# Patient Record
Sex: FEMALE | Race: ASIAN | Employment: OTHER | URBAN - METROPOLITAN AREA
[De-identification: names, ages, dates, MRNs, and addresses within clinical notes are randomized per-mention and may not be internally consistent; named-entity substitution may affect disease eponyms.]

---

## 2017-01-20 ENCOUNTER — TELEPHONE (OUTPATIENT)
Dept: HEMATOLOGY/ONCOLOGY | Facility: HOSPITAL | Age: 62
End: 2017-01-20

## 2017-01-24 ENCOUNTER — TELEPHONE (OUTPATIENT)
Dept: HEMATOLOGY/ONCOLOGY | Facility: HOSPITAL | Age: 62
End: 2017-01-24

## 2017-01-24 NOTE — TELEPHONE ENCOUNTER
Returned patient's call.   LMOMTCB re: high-risk consultation (Referrals in Epic to complete consult)

## 2017-03-01 ENCOUNTER — OFFICE VISIT (OUTPATIENT)
Dept: GENETICS | Facility: HOSPITAL | Age: 62
End: 2017-03-01
Attending: GENETIC COUNSELOR, MS
Payer: COMMERCIAL

## 2017-03-01 ENCOUNTER — OFFICE VISIT (OUTPATIENT)
Dept: SURGERY | Facility: CLINIC | Age: 62
End: 2017-03-01

## 2017-03-01 VITALS
HEART RATE: 72 BPM | BODY MASS INDEX: 25.47 KG/M2 | WEIGHT: 138.38 LBS | DIASTOLIC BLOOD PRESSURE: 71 MMHG | SYSTOLIC BLOOD PRESSURE: 148 MMHG | HEIGHT: 62 IN

## 2017-03-01 DIAGNOSIS — Z80.3 FAMILY HISTORY OF MALIGNANT NEOPLASM OF BREAST: Primary | ICD-10-CM

## 2017-03-01 PROCEDURE — 96040 MEDICAL GENETICS COUNSELING EA 30 MIN: CPT | Performed by: GENETIC COUNSELOR, MS

## 2017-03-01 PROCEDURE — 99244 OFF/OP CNSLTJ NEW/EST MOD 40: CPT | Performed by: SURGERY

## 2017-03-01 NOTE — PROGRESS NOTES
Reason for visit: Mrs. Liset Adams is a 19-year-old woman referred to the high risk breast cancer and prevention clinic for a family history of early-onset breast cancer as well as other cancers.   She was seen today for genetic counseling and to discuss the nulliparous. She is currently post-menopausal and denies any use of hormone replacement therapy or oral contraceptives. Mrs. Amirah Terry denies any alcohol consumption or tobacco usage.   She has had a colonoscopy with normal results and is aware that she BRCA2 account for most (but not all) cases of hereditary breast cancer.   Mutations in other genes have also been associated with an increased risk for breast cancer - but mutations in these other genes are statistically less often seen in hereditary breast insurers, long term healthcare or disability, which are not covered by statutes yet. She plans to discuss the option of testing with her sister and determine if she would be willing to pursue this.  We did review that her sister meets Medicare criteria for

## 2017-03-02 NOTE — PROGRESS NOTES
High Risk Breast Cancer Screening and Prevention Clinic    History of Present Illness:   This is the first visit for this 64year old woman, referred by Dr. Gabriele Langley, who presents for breast cancer risk evaluation related to her family history, which is deta Oral Tab Take  by mouth. Disp:  Rfl:    Garlic 5970 MG Oral Cap Take  by mouth. Disp:  Rfl:    cetirizine (ZYRTEC) 10 MG Oral Tab Take 10 mg by mouth daily.  Disp:  Rfl:      Allergies:      Latex                       Comment:Other reaction(s): LATEX    Fa There is no history of difficulty or pain with swallowing, +reflux symptoms, vomiting, dark or bloody stools, constipation, yellowing of the skin, indigestion, nausea, change in bowel habits, diarrhea, abdominal pain or vomiting blood.      Genitourinary: masses/nodules. There are no palpable masses. The trachea is in the midline. Conjunctiva are clear, non-icteric. Chest: The chest expands symmetrically. The lungs are clear to auscultation. Heart: The rhythm is regular.   There are no murmurs, rubs, g to include age, future breast biopsy, as well as additional family members that may be diagnosed with breast cancer. We then turned our discussion towards genetic counseling and testing.  Based on the findings, she met with our genetic counselor today t

## 2017-05-17 ENCOUNTER — TELEPHONE (OUTPATIENT)
Dept: INTERNAL MEDICINE CLINIC | Facility: CLINIC | Age: 62
End: 2017-05-17

## 2017-06-30 ENCOUNTER — APPOINTMENT (OUTPATIENT)
Dept: LAB | Age: 62
End: 2017-06-30
Attending: INTERNAL MEDICINE
Payer: COMMERCIAL

## 2017-06-30 ENCOUNTER — OFFICE VISIT (OUTPATIENT)
Dept: INTERNAL MEDICINE CLINIC | Facility: CLINIC | Age: 62
End: 2017-06-30

## 2017-06-30 VITALS
HEART RATE: 72 BPM | DIASTOLIC BLOOD PRESSURE: 88 MMHG | TEMPERATURE: 98 F | HEIGHT: 62 IN | BODY MASS INDEX: 25.06 KG/M2 | WEIGHT: 136.19 LBS | SYSTOLIC BLOOD PRESSURE: 142 MMHG | OXYGEN SATURATION: 97 %

## 2017-06-30 DIAGNOSIS — Z78.0 POSTMENOPAUSAL: ICD-10-CM

## 2017-06-30 DIAGNOSIS — Z00.00 ROUTINE GENERAL MEDICAL EXAMINATION AT A HEALTH CARE FACILITY: ICD-10-CM

## 2017-06-30 DIAGNOSIS — R92.2 DENSE BREASTS: ICD-10-CM

## 2017-06-30 DIAGNOSIS — Z12.11 COLON CANCER SCREENING: ICD-10-CM

## 2017-06-30 DIAGNOSIS — R76.11 POSITIVE PPD: ICD-10-CM

## 2017-06-30 DIAGNOSIS — R73.9 HYPERGLYCEMIA: ICD-10-CM

## 2017-06-30 DIAGNOSIS — Z12.31 VISIT FOR SCREENING MAMMOGRAM: ICD-10-CM

## 2017-06-30 DIAGNOSIS — Z86.69 HISTORY OF MENIERE'S DISEASE: ICD-10-CM

## 2017-06-30 DIAGNOSIS — K21.9 GASTROESOPHAGEAL REFLUX DISEASE WITHOUT ESOPHAGITIS: ICD-10-CM

## 2017-06-30 DIAGNOSIS — Z91.89 AT HIGH RISK FOR BREAST CANCER: ICD-10-CM

## 2017-06-30 DIAGNOSIS — Z00.00 ROUTINE GENERAL MEDICAL EXAMINATION AT A HEALTH CARE FACILITY: Primary | ICD-10-CM

## 2017-06-30 DIAGNOSIS — Z12.11 SCREENING FOR COLON CANCER: ICD-10-CM

## 2017-06-30 LAB
BILIRUB UR QL: NEGATIVE
CHOLEST SERPL-MCNC: 143 MG/DL (ref 110–200)
COLOR UR: YELLOW
ERYTHROCYTE [DISTWIDTH] IN BLOOD BY AUTOMATED COUNT: 13.5 % (ref 11–15)
FOLATE SERPL-MCNC: >23.3 NG/ML
GLUCOSE UR-MCNC: NEGATIVE MG/DL
HBA1C MFR BLD: 5.7 % (ref 4–6)
HCT VFR BLD AUTO: 39.7 % (ref 35–48)
HDLC SERPL-MCNC: 56 MG/DL
HGB BLD-MCNC: 13.3 G/DL (ref 12–16)
KETONES UR-MCNC: NEGATIVE MG/DL
LDLC SERPL CALC-MCNC: 70 MG/DL (ref 0–99)
LEUKOCYTE ESTERASE UR QL STRIP.AUTO: NEGATIVE
MCH RBC QN AUTO: 30 PG (ref 27–32)
MCHC RBC AUTO-ENTMCNC: 33.5 G/DL (ref 32–37)
MCV RBC AUTO: 89.5 FL (ref 80–100)
NITRITE UR QL STRIP.AUTO: NEGATIVE
NONHDLC SERPL-MCNC: 87 MG/DL
PH UR: 6 [PH] (ref 5–8)
PLATELET # BLD AUTO: 204 K/UL (ref 140–400)
PMV BLD AUTO: 7.5 FL (ref 7.4–10.3)
PROT UR-MCNC: NEGATIVE MG/DL
RBC # BLD AUTO: 4.44 M/UL (ref 3.7–5.4)
RBC #/AREA URNS AUTO: 3 /HPF
SP GR UR STRIP: 1.01 (ref 1–1.03)
T4 FREE SERPL-MCNC: 1.04 NG/DL (ref 0.58–1.64)
TRIGL SERPL-MCNC: 85 MG/DL (ref 1–149)
TSH SERPL-ACNC: 0.87 UIU/ML (ref 0.45–5.33)
UROBILINOGEN UR STRIP-ACNC: <2
VIT B12 SERPL-MCNC: >1500 PG/ML (ref 181–914)
VIT C UR-MCNC: NEGATIVE MG/DL
WBC # BLD AUTO: 6 K/UL (ref 4–11)
WBC #/AREA URNS AUTO: 1 /HPF

## 2017-06-30 PROCEDURE — 84439 ASSAY OF FREE THYROXINE: CPT

## 2017-06-30 PROCEDURE — 85027 COMPLETE CBC AUTOMATED: CPT

## 2017-06-30 PROCEDURE — 83036 HEMOGLOBIN GLYCOSYLATED A1C: CPT

## 2017-06-30 PROCEDURE — 99396 PREV VISIT EST AGE 40-64: CPT | Performed by: INTERNAL MEDICINE

## 2017-06-30 PROCEDURE — 93005 ELECTROCARDIOGRAM TRACING: CPT

## 2017-06-30 PROCEDURE — 80061 LIPID PANEL: CPT

## 2017-06-30 PROCEDURE — 86706 HEP B SURFACE ANTIBODY: CPT

## 2017-06-30 PROCEDURE — 93010 ELECTROCARDIOGRAM REPORT: CPT | Performed by: INTERNAL MEDICINE

## 2017-06-30 PROCEDURE — 36415 COLL VENOUS BLD VENIPUNCTURE: CPT

## 2017-06-30 PROCEDURE — 84443 ASSAY THYROID STIM HORMONE: CPT

## 2017-06-30 PROCEDURE — 82607 VITAMIN B-12: CPT

## 2017-06-30 PROCEDURE — 82306 VITAMIN D 25 HYDROXY: CPT

## 2017-06-30 PROCEDURE — 81001 URINALYSIS AUTO W/SCOPE: CPT

## 2017-06-30 PROCEDURE — 82746 ASSAY OF FOLIC ACID SERUM: CPT

## 2017-06-30 RX ORDER — ASCORBIC ACID 500 MG
500 TABLET ORAL DAILY
COMMUNITY

## 2017-06-30 RX ORDER — UBIDECARENONE 100 MG
100 CAPSULE ORAL DAILY
COMMUNITY

## 2017-06-30 NOTE — PROGRESS NOTES
HPI:    Patient ID: Lulú Morales is a 64year old female.     HPI    Physical exam    Hx of postive PPD  Request  CXR  Renal dialysis RN would like  HepB antibody  Level  Generally healthy  Took  Med for osteopenis in the past   Dense breast  Due TABLET BY MOUTH ON THE SAME DATE WITH FULL GLASS OF WATER AT LEAST 30 MINUTES BEFORE FIRST FOOD/ DRINK Disp: 3 tablet Rfl: 3   hydrochlorothiazide 25 MG Oral Tab TAKE 1 TABLET BY MOUTH EVERY DAY AS NEEDED Disp: 90 tablet Rfl: 3   Atorvastatin Calcium 20 MG info      Social History: Smoking status: Never Smoker                                                              Alcohol use: No                 PHYSICAL EXAM:    Physical Exam   Constitutional: She appears well-nourished. No distress.    HENT:   Head: N ANTIBODY    Generally healthy      (R73.9) Hyperglycemia  Plan: labs ordered    (Z86.69) History of Meniere's disease  Plan: on hydrocholorthiazide  Denies hx of HTN    (Z91.89) At high risk for breast cancer  Plan: mammo ordered    (R92.2) Dense breasts

## 2017-07-03 LAB
25(OH)D3 SERPL-MCNC: 39.5 NG/ML
HBV SURFACE AB SER-ACNC: 279.69 MIU/ML (ref ?–10)
HBV SURFACE AG SERPL QL IA: REACTIVE

## 2017-07-10 ENCOUNTER — HOSPITAL ENCOUNTER (OUTPATIENT)
Dept: GENERAL RADIOLOGY | Facility: HOSPITAL | Age: 62
Discharge: HOME | End: 2017-07-10
Attending: INTERNAL MEDICINE
Payer: COMMERCIAL

## 2017-07-10 DIAGNOSIS — R76.11 POSITIVE PPD: ICD-10-CM

## 2017-07-10 PROCEDURE — 71020 XR CHEST PA + LAT CHEST (CPT=71020): CPT | Performed by: INTERNAL MEDICINE

## 2017-07-27 ENCOUNTER — TELEPHONE (OUTPATIENT)
Dept: INTERNAL MEDICINE CLINIC | Facility: CLINIC | Age: 62
End: 2017-07-27

## 2017-08-26 DIAGNOSIS — E78.5 HYPERLIPIDEMIA, UNSPECIFIED HYPERLIPIDEMIA TYPE: ICD-10-CM

## 2017-08-28 RX ORDER — ATORVASTATIN CALCIUM 20 MG/1
TABLET, FILM COATED ORAL
Qty: 90 TABLET | Refills: 0 | Status: SHIPPED | OUTPATIENT
Start: 2017-08-28 | End: 2017-12-07

## 2017-08-29 ENCOUNTER — TELEPHONE (OUTPATIENT)
Dept: GASTROENTEROLOGY | Facility: CLINIC | Age: 62
End: 2017-08-29

## 2017-08-29 DIAGNOSIS — K21.9 GASTROESOPHAGEAL REFLUX DISEASE, ESOPHAGITIS PRESENCE NOT SPECIFIED: ICD-10-CM

## 2017-08-29 DIAGNOSIS — Z80.0 FAMILY HISTORY OF COLON CANCER: Primary | ICD-10-CM

## 2017-08-30 NOTE — TELEPHONE ENCOUNTER
See 6/29/17 tele enc. Pt previously cancelled and now calling back to reschedule. Routed to surgery schedulers.  Can take up to 72 hours for call back    Colonoscopy & EGD    Dx: family history of colon cancer and GERD    Miralax prep    IV sedation

## 2017-08-30 NOTE — TELEPHONE ENCOUNTER
Pt is aware of at least 72 hr call back time. Pt was upset she was not informed of this before and was expecting a call yesterday.  Please Call Thank You

## 2017-09-11 NOTE — TELEPHONE ENCOUNTER
CBLM to schedule procedure.  Please transfer to [facility] in GI

## 2017-09-13 NOTE — TELEPHONE ENCOUNTER
Scheduled for:  Colonoscopy [address] and EGD [address]  Provider Name: Dr. Paras Richardson  Date:  12/4/17  Location:  [facility]  Sedation:  IV  Time:  0900 (pt is aware to arrive at 0800)   Prep:  2 day Miralax/Gatorade, mailed 9/13/17  Meds/Allergies Reconciled?:  Physician Nela Randolph

## 2017-10-11 ENCOUNTER — PATIENT MESSAGE (OUTPATIENT)
Dept: INTERNAL MEDICINE CLINIC | Facility: CLINIC | Age: 62
End: 2017-10-11

## 2017-10-12 NOTE — TELEPHONE ENCOUNTER
From: Frieda Puentes  To: Renea Shelton MD  Sent: 10/11/2017 5:02 PM CDT  Subject: Referral Lindo Ray Dr. Rommel Hernandez,   Could you please send me a referral for a mammogram?, I miss placed the one I had. Could you also put it in MyChart?

## 2017-10-31 ENCOUNTER — HOSPITAL ENCOUNTER (OUTPATIENT)
Dept: MAMMOGRAPHY | Age: 62
Discharge: HOME | End: 2017-10-31
Attending: INTERNAL MEDICINE
Payer: COMMERCIAL

## 2017-10-31 DIAGNOSIS — Z12.31 VISIT FOR SCREENING MAMMOGRAM: ICD-10-CM

## 2017-10-31 DIAGNOSIS — R92.2 DENSE BREASTS: ICD-10-CM

## 2017-10-31 PROCEDURE — 77067 SCR MAMMO BI INCL CAD: CPT | Performed by: INTERNAL MEDICINE

## 2017-10-31 PROCEDURE — 77063 BREAST TOMOSYNTHESIS BI: CPT | Performed by: INTERNAL MEDICINE

## 2017-12-04 ENCOUNTER — SURGERY (OUTPATIENT)
Age: 62
End: 2017-12-04

## 2017-12-04 ENCOUNTER — HOSPITAL ENCOUNTER (OUTPATIENT)
Facility: HOSPITAL | Age: 62
Setting detail: HOSPITAL OUTPATIENT SURGERY
Discharge: HOME | End: 2017-12-04
Attending: INTERNAL MEDICINE | Admitting: INTERNAL MEDICINE
Payer: COMMERCIAL

## 2017-12-04 DIAGNOSIS — Z80.0 FAMILY HISTORY OF COLON CANCER: ICD-10-CM

## 2017-12-04 DIAGNOSIS — K21.9 GASTROESOPHAGEAL REFLUX DISEASE, ESOPHAGITIS PRESENCE NOT SPECIFIED: ICD-10-CM

## 2017-12-04 PROCEDURE — 0DBL8ZX EXCISION OF TRANSVERSE COLON, VIA NATURAL OR ARTIFICIAL OPENING ENDOSCOPIC, DIAGNOSTIC: ICD-10-PCS | Performed by: INTERNAL MEDICINE

## 2017-12-04 PROCEDURE — 99152 MOD SED SAME PHYS/QHP 5/>YRS: CPT | Performed by: INTERNAL MEDICINE

## 2017-12-04 PROCEDURE — 0DB68ZX EXCISION OF STOMACH, VIA NATURAL OR ARTIFICIAL OPENING ENDOSCOPIC, DIAGNOSTIC: ICD-10-PCS | Performed by: INTERNAL MEDICINE

## 2017-12-04 PROCEDURE — 0DB98ZX EXCISION OF DUODENUM, VIA NATURAL OR ARTIFICIAL OPENING ENDOSCOPIC, DIAGNOSTIC: ICD-10-PCS | Performed by: INTERNAL MEDICINE

## 2017-12-04 PROCEDURE — 45385 COLONOSCOPY W/LESION REMOVAL: CPT | Performed by: INTERNAL MEDICINE

## 2017-12-04 PROCEDURE — 43239 EGD BIOPSY SINGLE/MULTIPLE: CPT | Performed by: INTERNAL MEDICINE

## 2017-12-04 PROCEDURE — 99153 MOD SED SAME PHYS/QHP EA: CPT | Performed by: INTERNAL MEDICINE

## 2017-12-04 RX ORDER — MIDAZOLAM HYDROCHLORIDE 1 MG/ML
INJECTION INTRAMUSCULAR; INTRAVENOUS
Status: DISCONTINUED | OUTPATIENT
Start: 2017-12-04 | End: 2017-12-04

## 2017-12-04 RX ORDER — SODIUM CHLORIDE, SODIUM LACTATE, POTASSIUM CHLORIDE, CALCIUM CHLORIDE 600; 310; 30; 20 MG/100ML; MG/100ML; MG/100ML; MG/100ML
INJECTION, SOLUTION INTRAVENOUS CONTINUOUS
Status: DISCONTINUED | OUTPATIENT
Start: 2017-12-04 | End: 2017-12-04

## 2017-12-04 RX ORDER — SODIUM CHLORIDE 0.9 % (FLUSH) 0.9 %
10 SYRINGE (ML) INJECTION AS NEEDED
Status: DISCONTINUED | OUTPATIENT
Start: 2017-12-04 | End: 2017-12-04

## 2017-12-04 RX ORDER — MIDAZOLAM HYDROCHLORIDE 1 MG/ML
1 INJECTION INTRAMUSCULAR; INTRAVENOUS EVERY 5 MIN PRN
Status: DISCONTINUED | OUTPATIENT
Start: 2017-12-04 | End: 2017-12-04

## 2017-12-04 NOTE — OPERATIVE REPORT
[facility] Endoscopy Report      Date of Procedure:  12/04/17      Preoperative Diagnosis:  1. Colorectal cancer screening  2. Chronic gastroesophageal reflux disease      Postoperative Diagnosis:  1.   Diminutive transverse colon polyp  2 fundus was performed. The instrument was straightened, insufflated air and fluid were suctioned and the endoscope was withdrawn. The procedures were well tolerated without immediate complication.       Findings:  The preparation of the colon was very good

## 2017-12-04 NOTE — H&P
History & Physical Examination    Patient Name: Pinky Markham  MRN: [id number]  [facility]: [id number]  YOB: 1955    Diagnosis: Colorectal cancer screening, GERD        Prescriptions Prior to Admission:  ATORVASTATIN 20 MG Oral Tab TAKE 1 T Problem Relation Age of Onset   • Breast Cancer Sister 28 2nd dx @ 76, contralateral   • Cancer Father 80     d.80; non-smoker   • Cancer Brother 61     fmr smoker; stomach ca   • Cancer Paternal Aunt      cervical ca   • Stroke Mother    • Cancer Br

## 2017-12-05 VITALS
HEIGHT: 62 IN | SYSTOLIC BLOOD PRESSURE: 106 MMHG | BODY MASS INDEX: 23.55 KG/M2 | DIASTOLIC BLOOD PRESSURE: 70 MMHG | OXYGEN SATURATION: 97 % | RESPIRATION RATE: 19 BRPM | WEIGHT: 128 LBS | HEART RATE: 61 BPM

## 2017-12-07 DIAGNOSIS — E78.5 HYPERLIPIDEMIA, UNSPECIFIED HYPERLIPIDEMIA TYPE: ICD-10-CM

## 2017-12-07 NOTE — TELEPHONE ENCOUNTER
OV Dr Sharad Garcia 6/30/17.   Please advise on refill request.    Cholesterol Medications  Protocol Criteria:  · Appointment scheduled in the past 12 months or in the next 3 months  · ALT & LDL on file in the past 12 months  · ALT result < 80  · LDL result <130

## 2017-12-08 DIAGNOSIS — E78.5 HYPERLIPIDEMIA, UNSPECIFIED HYPERLIPIDEMIA TYPE: ICD-10-CM

## 2017-12-08 RX ORDER — ATORVASTATIN CALCIUM 20 MG/1
TABLET, FILM COATED ORAL
Qty: 90 TABLET | Refills: 0
Start: 2017-12-08

## 2017-12-08 RX ORDER — ATORVASTATIN CALCIUM 20 MG/1
TABLET, FILM COATED ORAL
Qty: 90 TABLET | Refills: 1 | Status: SHIPPED | OUTPATIENT
Start: 2017-12-08 | End: 2018-06-15

## 2017-12-08 NOTE — TELEPHONE ENCOUNTER
[place], [place] - [facility] [place] AT [place] [address], [phone], [phone]   Medication Detail      Disp Refills Start End    atorvastatin 20 MG Oral Tab 90 tablet 1 12/8/2017     Sig: TAKE 1 TABLET(20 MG) B

## 2017-12-13 ENCOUNTER — TELEPHONE (OUTPATIENT)
Dept: GASTROENTEROLOGY | Facility: CLINIC | Age: 62
End: 2017-12-13

## 2017-12-13 NOTE — TELEPHONE ENCOUNTER
----- Message from Memo Landry MD sent at 12/11/2017  7:46 PM CST -----  Please see the following My Chart message sent to the patient:    \"Serenity,     I left a message on your voicemail.     Your recent colonoscopy revealed #1 small polyp whic

## 2018-03-19 NOTE — TELEPHONE ENCOUNTER
From: Alexus Howell  Sent: 3/19/2018 6:06 AM CDT  Subject: Medication Renewal Request    Alexus Howell would like a refill of the following medications:     hydrochlorothiazide 25 MG Oral Tab Ruthy Dobbins MD]    Preferred pharmacy: [facility] [address] AT THE [address], [phone], [phone]

## 2018-03-20 NOTE — TELEPHONE ENCOUNTER
Pt does not want to make appt state that she does not need to see the doctor for medication and hung up

## 2018-03-22 RX ORDER — HYDROCHLOROTHIAZIDE 25 MG/1
TABLET ORAL
Qty: 30 TABLET | Refills: 1 | Status: SHIPPED | OUTPATIENT
Start: 2018-03-22 | End: 2018-08-08

## 2018-06-14 DIAGNOSIS — E78.5 HYPERLIPIDEMIA, UNSPECIFIED HYPERLIPIDEMIA TYPE: ICD-10-CM

## 2018-06-15 DIAGNOSIS — E78.5 HYPERLIPIDEMIA, UNSPECIFIED HYPERLIPIDEMIA TYPE: ICD-10-CM

## 2018-06-15 RX ORDER — ATORVASTATIN CALCIUM 20 MG/1
TABLET, FILM COATED ORAL
Qty: 30 TABLET | Refills: 0 | Status: SHIPPED | OUTPATIENT
Start: 2018-06-15 | End: 2018-08-04

## 2018-06-15 RX ORDER — ATORVASTATIN CALCIUM 20 MG/1
TABLET, FILM COATED ORAL
Qty: 90 TABLET | Refills: 0 | OUTPATIENT
Start: 2018-06-15

## 2018-06-18 ENCOUNTER — TELEPHONE (OUTPATIENT)
Dept: INTERNAL MEDICINE CLINIC | Facility: CLINIC | Age: 63
End: 2018-06-18

## 2018-06-18 NOTE — TELEPHONE ENCOUNTER
Pt called in to schedule px appt. Pt is requesting her lab work be added to her chart prior to her appt so the results can be discussed. Pt is requesting all of the same labs from 6/30/17, but wants to add Hepatitis A & C and a CMP lab.      Please call luz marina

## 2018-06-27 ENCOUNTER — TELEPHONE (OUTPATIENT)
Dept: INTERNAL MEDICINE CLINIC | Facility: CLINIC | Age: 63
End: 2018-06-27

## 2018-06-29 ENCOUNTER — LAB ENCOUNTER (OUTPATIENT)
Dept: LAB | Facility: HOSPITAL | Age: 63
End: 2018-06-29
Attending: INTERNAL MEDICINE
Payer: COMMERCIAL

## 2018-06-29 DIAGNOSIS — Z00.00 ROUTINE GENERAL MEDICAL EXAMINATION AT A HEALTH CARE FACILITY: ICD-10-CM

## 2018-06-29 DIAGNOSIS — Z00.00 EXAMINATION, MEDICAL, GENERAL: ICD-10-CM

## 2018-06-29 DIAGNOSIS — Z11.59 SCREENING FOR VIRAL DISEASE: ICD-10-CM

## 2018-06-29 LAB
ALBUMIN SERPL BCP-MCNC: 4.3 G/DL (ref 3.5–4.8)
ALBUMIN/GLOB SERPL: 1.3 {RATIO} (ref 1–2)
ALP SERPL-CCNC: 64 U/L (ref 32–100)
ALT SERPL-CCNC: 30 U/L (ref 14–54)
ANION GAP SERPL CALC-SCNC: 10 MMOL/L (ref 0–18)
AST SERPL-CCNC: 26 U/L (ref 15–41)
BILIRUB SERPL-MCNC: 0.7 MG/DL (ref 0.3–1.2)
BUN SERPL-MCNC: 24 MG/DL (ref 8–20)
BUN/CREAT SERPL: 31.6 (ref 10–20)
CALCIUM SERPL-MCNC: 9 MG/DL (ref 8.5–10.5)
CHLORIDE SERPL-SCNC: 107 MMOL/L (ref 95–110)
CHOLEST SERPL-MCNC: 171 MG/DL (ref 110–200)
CO2 SERPL-SCNC: 22 MMOL/L (ref 22–32)
CREAT SERPL-MCNC: 0.76 MG/DL (ref 0.5–1.5)
CRP SERPL-MCNC: <0.5 MG/DL (ref 0–0.9)
ERYTHROCYTE [DISTWIDTH] IN BLOOD BY AUTOMATED COUNT: 13.6 % (ref 11–15)
GLOBULIN PLAS-MCNC: 3.3 G/DL (ref 2.5–3.7)
GLUCOSE SERPL-MCNC: 92 MG/DL (ref 70–99)
HBA1C MFR BLD: 5.7 % (ref 4–6)
HCT VFR BLD AUTO: 42.5 % (ref 35–48)
HDLC SERPL-MCNC: 61 MG/DL
HGB BLD-MCNC: 14 G/DL (ref 12–16)
HYALINE CASTS #/AREA URNS AUTO: 1 /LPF
LDLC SERPL CALC-MCNC: 92 MG/DL (ref 0–99)
MCH RBC QN AUTO: 29.9 PG (ref 27–32)
MCHC RBC AUTO-ENTMCNC: 32.9 G/DL (ref 32–37)
MCV RBC AUTO: 91.1 FL (ref 80–100)
NONHDLC SERPL-MCNC: 110 MG/DL
OSMOLALITY UR CALC.SUM OF ELEC: 292 MOSM/KG (ref 275–295)
PATIENT FASTING: YES
PLATELET # BLD AUTO: 221 K/UL (ref 140–400)
PMV BLD AUTO: 7.9 FL (ref 7.4–10.3)
POTASSIUM SERPL-SCNC: 4.5 MMOL/L (ref 3.3–5.1)
PROT SERPL-MCNC: 7.6 G/DL (ref 5.9–8.4)
RBC # BLD AUTO: 4.66 M/UL (ref 3.7–5.4)
RBC #/AREA URNS AUTO: 1 /HPF
SODIUM SERPL-SCNC: 139 MMOL/L (ref 136–144)
T4 FREE SERPL-MCNC: 1.05 NG/DL (ref 0.58–1.64)
TRIGL SERPL-MCNC: 91 MG/DL (ref 1–149)
TSH SERPL-ACNC: 0.89 UIU/ML (ref 0.45–5.33)
WBC # BLD AUTO: 6 K/UL (ref 4–11)
WBC #/AREA URNS AUTO: 1 /HPF

## 2018-06-29 PROCEDURE — 36415 COLL VENOUS BLD VENIPUNCTURE: CPT

## 2018-06-29 PROCEDURE — 86708 HEPATITIS A ANTIBODY: CPT

## 2018-06-29 PROCEDURE — 86140 C-REACTIVE PROTEIN: CPT

## 2018-06-29 PROCEDURE — 84439 ASSAY OF FREE THYROXINE: CPT

## 2018-06-29 PROCEDURE — 86706 HEP B SURFACE ANTIBODY: CPT

## 2018-06-29 PROCEDURE — 86704 HEP B CORE ANTIBODY TOTAL: CPT

## 2018-06-29 PROCEDURE — 80053 COMPREHEN METABOLIC PANEL: CPT

## 2018-06-29 PROCEDURE — 86709 HEPATITIS A IGM ANTIBODY: CPT

## 2018-06-29 PROCEDURE — 80500 HEPATITIS A B + C PROFILE: CPT

## 2018-06-29 PROCEDURE — 81015 MICROSCOPIC EXAM OF URINE: CPT

## 2018-06-29 PROCEDURE — 87340 HEPATITIS B SURFACE AG IA: CPT

## 2018-06-29 PROCEDURE — 83036 HEMOGLOBIN GLYCOSYLATED A1C: CPT

## 2018-06-29 PROCEDURE — 80061 LIPID PANEL: CPT

## 2018-06-29 PROCEDURE — 85027 COMPLETE CBC AUTOMATED: CPT

## 2018-06-29 PROCEDURE — 86803 HEPATITIS C AB TEST: CPT

## 2018-06-29 PROCEDURE — 84443 ASSAY THYROID STIM HORMONE: CPT

## 2018-07-03 LAB
HAV AB SER QL IA: REACTIVE
HAV IGM SERPL QL IA: NONREACTIVE
HBV CORE AB SERPL QL IA: NONREACTIVE
HBV SURFACE AB SER-ACNC: 317.63 MIU/ML (ref ?–10)
HBV SURFACE AG SERPL QL IA: NONREACTIVE
HBV SURFACE AG SERPL QL IA: REACTIVE
HCV AB SERPL QL IA: NONREACTIVE

## 2018-07-06 ENCOUNTER — TELEPHONE (OUTPATIENT)
Dept: INTERNAL MEDICINE CLINIC | Facility: CLINIC | Age: 63
End: 2018-07-06

## 2018-07-09 NOTE — TELEPHONE ENCOUNTER
Advised patient on Dr. Wilfredo Garza information and recommendations. Patient verbalized understanding. Had questions about why she is immune to hepatitis A, but she says she will ask Dr Carole Solomon at her appt 7/24/18.       Notes recorded by Viktor Gardner on

## 2018-08-04 DIAGNOSIS — E78.5 HYPERLIPIDEMIA, UNSPECIFIED HYPERLIPIDEMIA TYPE: ICD-10-CM

## 2018-08-04 RX ORDER — ATORVASTATIN CALCIUM 20 MG/1
TABLET, FILM COATED ORAL
Qty: 90 TABLET | Refills: 0 | Status: SHIPPED | OUTPATIENT
Start: 2018-08-04 | End: 2018-08-08

## 2018-08-04 NOTE — TELEPHONE ENCOUNTER
Refill passed per [facility] protocol.   Cholesterol Medications  Protocol Criteria:  · Appointment scheduled in the past 12 months or in the next 3 months  · ALT & LDL on file in the past 12 months  · ALT result < 80  · LDL result <130   Recent Outpat

## 2018-08-08 ENCOUNTER — OFFICE VISIT (OUTPATIENT)
Dept: INTERNAL MEDICINE CLINIC | Facility: CLINIC | Age: 63
End: 2018-08-08

## 2018-08-08 VITALS
WEIGHT: 137 LBS | DIASTOLIC BLOOD PRESSURE: 67 MMHG | HEIGHT: 62 IN | BODY MASS INDEX: 25.21 KG/M2 | SYSTOLIC BLOOD PRESSURE: 133 MMHG | HEART RATE: 59 BPM

## 2018-08-08 DIAGNOSIS — E78.5 HYPERLIPIDEMIA, UNSPECIFIED HYPERLIPIDEMIA TYPE: ICD-10-CM

## 2018-08-08 DIAGNOSIS — M25.512 CHRONIC PAIN OF BOTH SHOULDERS: ICD-10-CM

## 2018-08-08 DIAGNOSIS — Z00.00 ROUTINE GENERAL MEDICAL EXAMINATION AT A HEALTH CARE FACILITY: Primary | ICD-10-CM

## 2018-08-08 DIAGNOSIS — Z12.31 VISIT FOR SCREENING MAMMOGRAM: ICD-10-CM

## 2018-08-08 DIAGNOSIS — M21.961 DEFORMITY OF RIGHT FOOT: ICD-10-CM

## 2018-08-08 DIAGNOSIS — G89.29 CHRONIC PAIN OF BOTH SHOULDERS: ICD-10-CM

## 2018-08-08 DIAGNOSIS — M25.511 CHRONIC PAIN OF BOTH SHOULDERS: ICD-10-CM

## 2018-08-08 PROCEDURE — 99396 PREV VISIT EST AGE 40-64: CPT | Performed by: INTERNAL MEDICINE

## 2018-08-08 RX ORDER — HYDROCHLOROTHIAZIDE 25 MG/1
TABLET ORAL
Qty: 15 TABLET | Refills: 11 | Status: SHIPPED | OUTPATIENT
Start: 2018-08-08 | End: 2019-05-14

## 2018-08-08 RX ORDER — ATORVASTATIN CALCIUM 20 MG/1
20 TABLET, FILM COATED ORAL
Qty: 90 TABLET | Refills: 3 | Status: SHIPPED | OUTPATIENT
Start: 2018-08-08 | End: 2019-05-14

## 2018-08-08 RX ORDER — RISEDRONATE SODIUM 150 MG/1
150 TABLET, FILM COATED ORAL
Qty: 3 TABLET | Refills: 3 | Status: SHIPPED | OUTPATIENT
Start: 2018-08-08 | End: 2019-05-14

## 2018-08-22 NOTE — PROGRESS NOTES
HPI:    Patient ID: Taylor Gaffney is a 58year old female.     HPI  Physical exam      /67 (BP Location: Left arm, Patient Position: Sitting, Cuff Size: adult)   Pulse 59   Ht 5' 2\" (1.575 m)   Wt 137 lb (62.1 kg)   LMP 10/03/1995 (Approxima Disp:  Rfl:    Vitamin C 500 MG Oral Tab Take 500 mg by mouth daily. Disp:  Rfl:    Calcium Carb-Cholecalciferol (CALCIUM 1000 + D) 1000-800 MG-UNIT Oral Tab Take  by mouth. Disp:  Rfl:    omega-3 fatty acids (FISH OIL) 1000 MG Oral Cap Take  by mouth.  Dis Nose normal.   Mouth/Throat: Oropharynx is clear and moist. No oropharyngeal exudate. Eyes: Conjunctivae and EOM are normal. Pupils are equal, round, and reactive to light. Right eye exhibits no discharge. Left eye exhibits no discharge.  No scleral icter Risedronate Sodium 150 MG Oral Tab 3 tablet 3      Sig: Take 1 tablet (150 mg total) by mouth every 30 (thirty) days.       hydrochlorothiazide 25 MG Oral Tab 15 tablet 11      Sig: TAKE 1 TABLET BY MOUTH EVERY DAY AS NEEDED      atorvastatin 20 MG Oral Tab

## 2018-11-27 ENCOUNTER — HOSPITAL ENCOUNTER (OUTPATIENT)
Dept: MAMMOGRAPHY | Age: 63
Discharge: HOME | End: 2018-11-27
Attending: INTERNAL MEDICINE
Payer: COMMERCIAL

## 2018-11-27 DIAGNOSIS — Z12.31 VISIT FOR SCREENING MAMMOGRAM: ICD-10-CM

## 2018-11-27 PROCEDURE — 77063 BREAST TOMOSYNTHESIS BI: CPT | Performed by: INTERNAL MEDICINE

## 2018-11-27 PROCEDURE — 77067 SCR MAMMO BI INCL CAD: CPT | Performed by: INTERNAL MEDICINE

## 2019-03-22 ENCOUNTER — TELEPHONE (OUTPATIENT)
Dept: INTERNAL MEDICINE CLINIC | Facility: CLINIC | Age: 64
End: 2019-03-22

## 2019-05-14 ENCOUNTER — OFFICE VISIT (OUTPATIENT)
Dept: INTERNAL MEDICINE CLINIC | Facility: CLINIC | Age: 64
End: 2019-05-14

## 2019-05-14 VITALS
RESPIRATION RATE: 19 BRPM | SYSTOLIC BLOOD PRESSURE: 122 MMHG | DIASTOLIC BLOOD PRESSURE: 80 MMHG | HEIGHT: 62 IN | BODY MASS INDEX: 26.13 KG/M2 | OXYGEN SATURATION: 98 % | WEIGHT: 142 LBS | HEART RATE: 82 BPM

## 2019-05-14 DIAGNOSIS — E78.5 HYPERLIPIDEMIA, UNSPECIFIED HYPERLIPIDEMIA TYPE: ICD-10-CM

## 2019-05-14 DIAGNOSIS — Z11.59 NEED FOR HEPATITIS C SCREENING TEST: ICD-10-CM

## 2019-05-14 DIAGNOSIS — M15.9 PRIMARY OSTEOARTHRITIS INVOLVING MULTIPLE JOINTS: ICD-10-CM

## 2019-05-14 DIAGNOSIS — E03.9 ACQUIRED HYPOTHYROIDISM: ICD-10-CM

## 2019-05-14 DIAGNOSIS — I10 ESSENTIAL HYPERTENSION: ICD-10-CM

## 2019-05-14 DIAGNOSIS — E78.2 MIXED HYPERLIPIDEMIA: ICD-10-CM

## 2019-05-14 DIAGNOSIS — E55.9 VITAMIN D DEFICIENCY: ICD-10-CM

## 2019-05-14 DIAGNOSIS — S39.012A BACK STRAIN, INITIAL ENCOUNTER: Primary | ICD-10-CM

## 2019-05-14 DIAGNOSIS — R73.9 HYPERGLYCEMIA: ICD-10-CM

## 2019-05-14 DIAGNOSIS — Z01.419 ROUTINE GYNECOLOGICAL EXAMINATION: ICD-10-CM

## 2019-05-14 PROCEDURE — 99204 OFFICE O/P NEW MOD 45 MIN: CPT | Performed by: INTERNAL MEDICINE

## 2019-05-14 RX ORDER — ATORVASTATIN CALCIUM 20 MG/1
20 TABLET, FILM COATED ORAL
Qty: 90 TABLET | Refills: 3 | Status: SHIPPED | OUTPATIENT
Start: 2019-05-14 | End: 2019-10-26

## 2019-05-14 RX ORDER — CYCLOBENZAPRINE HCL 10 MG
10 TABLET ORAL 3 TIMES DAILY PRN
Qty: 30 TABLET | Refills: 1 | Status: SHIPPED | OUTPATIENT
Start: 2019-05-14 | End: 2019-06-03

## 2019-05-14 RX ORDER — RISEDRONATE SODIUM 150 MG/1
150 TABLET, FILM COATED ORAL
Qty: 3 TABLET | Refills: 3 | Status: SHIPPED | OUTPATIENT
Start: 2019-05-14 | End: 2019-07-27

## 2019-05-14 RX ORDER — METHYLPREDNISOLONE 4 MG/1
TABLET ORAL
Qty: 1 KIT | Refills: 0 | Status: SHIPPED | OUTPATIENT
Start: 2019-05-14 | End: 2019-09-12 | Stop reason: ALTCHOICE

## 2019-05-14 RX ORDER — COVID-19 ANTIGEN TEST
220 KIT MISCELLANEOUS
COMMUNITY
End: 2019-10-26 | Stop reason: ALTCHOICE

## 2019-05-14 RX ORDER — HYDROCHLOROTHIAZIDE 25 MG/1
TABLET ORAL
Qty: 90 TABLET | Refills: 2 | Status: SHIPPED | OUTPATIENT
Start: 2019-05-14 | End: 2020-10-26

## 2019-05-14 NOTE — PROGRESS NOTES
HPI:    Patient ID: Josie Moser is a 61year old female. HPI   Here to establish care    C/o right hip pain since 3/25 2019  after doing yard work. She was carrying heavy stuffs . Pain occurred after she twisted her body.  Pain is sometimes 10 3 hydrochlorothiazide 25 MG Oral Tab TAKE 1 TABLET BY MOUTH EVERY DAY AS NEEDED Disp: 90 tablet Rfl: 2 atorvastatin 20 MG Oral Tab Take 1 tablet (20 mg total) by mouth once daily.  Disp: 90 tablet Rfl: 3 Coenzyme Q10 100 MG Oral Cap Take 100 mg by mouth kendra tightness and shortness of breath. Cardiovascular: Negative for chest pain, palpitations and leg swelling. Gastrointestinal: Negative for abdominal pain and blood in stool. Endocrine: Negative for polyuria.    Genitourinary: Negative for dysuria and encounter  Avoid heavy weight lifting  Medrol dose pack as directed. To be taken with food. Cyclobenzaprine 10 mgs  TID prn     2. Routine gynecological examination  Prefers to see gyne. Consult Dr. Latonia De La Torre page   - [address]    3.  Essential hyperte

## 2019-05-29 ENCOUNTER — TELEPHONE (OUTPATIENT)
Dept: INTERNAL MEDICINE CLINIC | Facility: CLINIC | Age: 64
End: 2019-05-29

## 2019-05-29 DIAGNOSIS — Z78.0 POSTMENOPAUSAL: Primary | ICD-10-CM

## 2019-05-29 NOTE — TELEPHONE ENCOUNTER
Received call from [facility] central scheduling. Patient is trying to make appt for DEXA and needs an order in the computer. Please advise.

## 2019-06-15 ENCOUNTER — LAB ENCOUNTER (OUTPATIENT)
Dept: LAB | Facility: HOSPITAL | Age: 64
End: 2019-06-15
Attending: INTERNAL MEDICINE
Payer: COMMERCIAL

## 2019-06-15 ENCOUNTER — HOSPITAL ENCOUNTER (OUTPATIENT)
Dept: BONE DENSITY | Age: 64
Discharge: HOME | End: 2019-06-15
Attending: INTERNAL MEDICINE
Payer: COMMERCIAL

## 2019-06-15 DIAGNOSIS — R73.9 HYPERGLYCEMIA: ICD-10-CM

## 2019-06-15 DIAGNOSIS — E78.2 MIXED HYPERLIPIDEMIA: ICD-10-CM

## 2019-06-15 DIAGNOSIS — Z11.59 NEED FOR HEPATITIS C SCREENING TEST: ICD-10-CM

## 2019-06-15 DIAGNOSIS — M15.9 PRIMARY OSTEOARTHRITIS INVOLVING MULTIPLE JOINTS: ICD-10-CM

## 2019-06-15 DIAGNOSIS — Z78.0 POSTMENOPAUSAL: ICD-10-CM

## 2019-06-15 DIAGNOSIS — E03.9 ACQUIRED HYPOTHYROIDISM: ICD-10-CM

## 2019-06-15 DIAGNOSIS — E55.9 VITAMIN D DEFICIENCY: ICD-10-CM

## 2019-06-15 PROCEDURE — 80053 COMPREHEN METABOLIC PANEL: CPT

## 2019-06-15 PROCEDURE — 83036 HEMOGLOBIN GLYCOSYLATED A1C: CPT

## 2019-06-15 PROCEDURE — 82306 VITAMIN D 25 HYDROXY: CPT

## 2019-06-15 PROCEDURE — 77080 DXA BONE DENSITY AXIAL: CPT | Performed by: INTERNAL MEDICINE

## 2019-06-15 PROCEDURE — 80061 LIPID PANEL: CPT

## 2019-06-15 PROCEDURE — 85025 COMPLETE CBC W/AUTO DIFF WBC: CPT

## 2019-06-15 PROCEDURE — 36415 COLL VENOUS BLD VENIPUNCTURE: CPT

## 2019-06-15 PROCEDURE — 86803 HEPATITIS C AB TEST: CPT

## 2019-06-15 PROCEDURE — 86141 C-REACTIVE PROTEIN HS: CPT

## 2019-06-15 PROCEDURE — 84443 ASSAY THYROID STIM HORMONE: CPT

## 2019-06-25 ENCOUNTER — TELEPHONE (OUTPATIENT)
Dept: INTERNAL MEDICINE CLINIC | Facility: CLINIC | Age: 64
End: 2019-06-25

## 2019-06-25 NOTE — TELEPHONE ENCOUNTER
Called patient to inform her that she is do for a pap smear she refused the pap smear at this time and asked that I do not call her anymore about it

## 2019-07-29 RX ORDER — RISEDRONATE SODIUM 150 MG/1
150 TABLET, FILM COATED ORAL
Qty: 3 TABLET | Refills: 3 | Status: SHIPPED | OUTPATIENT
Start: 2019-07-29 | End: 2020-07-12

## 2019-08-20 ENCOUNTER — OFFICE VISIT (OUTPATIENT)
Dept: OBGYN CLINIC | Facility: CLINIC | Age: 64
End: 2019-08-20

## 2019-08-20 VITALS
BODY MASS INDEX: 26 KG/M2 | HEART RATE: 76 BPM | DIASTOLIC BLOOD PRESSURE: 77 MMHG | SYSTOLIC BLOOD PRESSURE: 144 MMHG | WEIGHT: 143 LBS

## 2019-08-20 DIAGNOSIS — Z01.419 ENCOUNTER FOR GYNECOLOGICAL EXAMINATION WITHOUT ABNORMAL FINDING: Primary | ICD-10-CM

## 2019-08-20 DIAGNOSIS — N63.0 LUMP OR MASS IN BREAST: ICD-10-CM

## 2019-08-20 PROCEDURE — 99386 PREV VISIT NEW AGE 40-64: CPT | Performed by: OBSTETRICS & GYNECOLOGY

## 2019-08-20 NOTE — PROGRESS NOTES
Danny Chavez is a 61year old female [facility] Patient's last menstrual period was 10/03/1995 (approximate).  who presents for Patient presents with:  Gyn Exam: New patient annual.    Pt noted a lump in the outer part of there right breast last week Days per week: Not on file        Minutes per session: Not on file      Stress: Not on file    Relationships      Social connections:        Talks on phone: Not on file        Gets together: Not on file        Attends Ten[employer] service: Not on file Rfl: 2  •  atorvastatin 20 MG Oral Tab, Take 1 tablet (20 mg total) by mouth once daily. , Disp: 90 tablet, Rfl: 3  •  Coenzyme Q10 100 MG Oral Cap, Take 100 mg by mouth daily. , Disp: , Rfl:   •  Vitamin C 500 MG Oral Tab, Take 500 mg by mouth daily. , Disp: increased density/mass in upper outer quad on the right breast  Respiratory:  lungs clear to auscultation bilaterally  Cardiovascular: regular rate and rhythm, no significant murmur  Abdomen:  soft, nontender, nondistended, no masses  Skin/Hair: no unusual

## 2019-08-21 LAB — HPV I/H RISK 1 DNA SPEC QL NAA+PROBE: NEGATIVE

## 2019-08-30 ENCOUNTER — TELEPHONE (OUTPATIENT)
Dept: MAMMOGRAPHY | Facility: HOSPITAL | Age: 64
End: 2019-08-30

## 2019-08-30 ENCOUNTER — HOSPITAL ENCOUNTER (OUTPATIENT)
Dept: ULTRASOUND IMAGING | Facility: HOSPITAL | Age: 64
Discharge: HOME | End: 2019-08-30
Attending: OBSTETRICS & GYNECOLOGY
Payer: COMMERCIAL

## 2019-08-30 ENCOUNTER — HOSPITAL ENCOUNTER (OUTPATIENT)
Dept: MAMMOGRAPHY | Facility: HOSPITAL | Age: 64
Discharge: HOME | End: 2019-08-30
Attending: OBSTETRICS & GYNECOLOGY
Payer: COMMERCIAL

## 2019-08-30 ENCOUNTER — TELEPHONE (OUTPATIENT)
Dept: OBGYN CLINIC | Facility: CLINIC | Age: 64
End: 2019-08-30

## 2019-08-30 DIAGNOSIS — N63.10 BREAST MASS, RIGHT: Primary | ICD-10-CM

## 2019-08-30 DIAGNOSIS — N63.0 LUMP OR MASS IN BREAST: ICD-10-CM

## 2019-08-30 DIAGNOSIS — R59.0 ENLARGED LYMPH NODES IN ARMPIT: ICD-10-CM

## 2019-08-30 PROCEDURE — 77066 DX MAMMO INCL CAD BI: CPT | Performed by: OBSTETRICS & GYNECOLOGY

## 2019-08-30 PROCEDURE — 77062 BREAST TOMOSYNTHESIS BI: CPT | Performed by: OBSTETRICS & GYNECOLOGY

## 2019-08-30 PROCEDURE — 76642 ULTRASOUND BREAST LIMITED: CPT | Performed by: OBSTETRICS & GYNECOLOGY

## 2019-08-30 NOTE — IMAGING NOTE
This nurse introduced self and role of breast coordinator. Discussed recommended breast biopsy with patient. Pt was recommended by Dr Alba Aj  to have a right  breast  And right axilla ultrasound guided biopsy.  Please call [phone]  to confirm Wednesday weeks. The patient has a family history of breast cancer, diagnosed in two sisters at ages 28 and 68. ULTRASOUND FINDINGS:   Targeted ultrasound of the right upper-outer breast was performed in the patient's palpable area of concern.        In the Oral Tablet Therapy Pack As directed. Disp: 1 kit Rfl: 0   hydrochlorothiazide 25 MG Oral Tab TAKE 1 TABLET BY MOUTH EVERY DAY AS NEEDED Disp: 90 tablet Rfl: 2   atorvastatin 20 MG Oral Tab Take 1 tablet (20 mg total) by mouth once daily.  Disp: 90 tablet R was seen on your previous breast imaging. The Radiologist will then inject a local numbing medication into the area. This may burn and sting for several seconds . Then use a needle to collect cells or tissue from the site.   A marker, or clip, will then b order from her physician  our radiology secretaries would be calling   to schedule the biopsy.

## 2019-08-30 NOTE — TELEPHONE ENCOUNTER
This rn called Dr lowe office spoke with Evgeny Silva at  am  Informed of bx needed ULTRASOUND BREAST AND RIGHT AXILLARY LYMPH NODE HIGHLY SUSPICIOUS  and that pt would need assistance in getting approval for bx.    Mrs Yann Olmstead she  has a bc/

## 2019-08-30 NOTE — TELEPHONE ENCOUNTER
Received call from [address] [facility] (breast center). [address]  states pt is being scheduled for U/S guided core bx of right breast and right axilla for next week. Pt needs referral asap . Message to referral coordinator.

## 2019-08-30 NOTE — TELEPHONE ENCOUNTER
Received order for right breast and axilla U/S core bx. Dx code right breast mass and right axillary lymph node. Order form completed and faxed back to Radiology. Will await order to be placed by radiology for CPT codes being used and then referral can be placed.

## 2019-09-03 ENCOUNTER — TELEPHONE (OUTPATIENT)
Dept: MAMMOGRAPHY | Facility: HOSPITAL | Age: 64
End: 2019-09-03

## 2019-09-03 NOTE — TELEPHONE ENCOUNTER
Lore Damon 4   Had questions regarding size of mass in her breast .  She was notified  Of size off her report questions answered.   She is aware that she is to arrive tomorrow at 630 am refusing to eat breakfast \" I don't eat until 9am \"

## 2019-09-04 ENCOUNTER — HOSPITAL ENCOUNTER (OUTPATIENT)
Dept: ULTRASOUND IMAGING | Facility: HOSPITAL | Age: 64
Discharge: HOME | End: 2019-09-04
Attending: OBSTETRICS & GYNECOLOGY
Payer: COMMERCIAL

## 2019-09-04 ENCOUNTER — HOSPITAL ENCOUNTER (OUTPATIENT)
Dept: MAMMOGRAPHY | Facility: HOSPITAL | Age: 64
Discharge: HOME | End: 2019-09-04
Attending: OBSTETRICS & GYNECOLOGY
Payer: COMMERCIAL

## 2019-09-04 VITALS — HEART RATE: 74 BPM | DIASTOLIC BLOOD PRESSURE: 61 MMHG | SYSTOLIC BLOOD PRESSURE: 131 MMHG

## 2019-09-04 DIAGNOSIS — R59.9 ENLARGED LYMPH NODE: ICD-10-CM

## 2019-09-04 DIAGNOSIS — N63.10 BREAST MASS, RIGHT: ICD-10-CM

## 2019-09-04 PROCEDURE — 19083 BX BREAST 1ST LESION US IMAG: CPT | Performed by: OBSTETRICS & GYNECOLOGY

## 2019-09-04 PROCEDURE — 38505 NEEDLE BIOPSY LYMPH NODES: CPT | Performed by: OBSTETRICS & GYNECOLOGY

## 2019-09-04 PROCEDURE — 77065 DX MAMMO INCL CAD UNI: CPT | Performed by: OBSTETRICS & GYNECOLOGY

## 2019-09-04 PROCEDURE — 88305 TISSUE EXAM BY PATHOLOGIST: CPT | Performed by: OBSTETRICS & GYNECOLOGY

## 2019-09-04 PROCEDURE — 76942 ECHO GUIDE FOR BIOPSY: CPT | Performed by: OBSTETRICS & GYNECOLOGY

## 2019-09-04 PROCEDURE — 88360 TUMOR IMMUNOHISTOCHEM/MANUAL: CPT | Performed by: OBSTETRICS & GYNECOLOGY

## 2019-09-04 PROCEDURE — 88342 IMHCHEM/IMCYTCHM 1ST ANTB: CPT | Performed by: OBSTETRICS & GYNECOLOGY

## 2019-09-04 NOTE — PROCEDURES
[facility]  Procedure Note    Taylor Gaffney Patient Status:  Outpatient    1955 MRN [id number]   Location [address] [facility] Attending Pj Horton MD   Hosp Day # 0 PCP Kee Ocampo MD     Procedure

## 2019-09-05 ENCOUNTER — OFFICE VISIT (OUTPATIENT)
Dept: INTERNAL MEDICINE CLINIC | Facility: CLINIC | Age: 64
End: 2019-09-05

## 2019-09-05 VITALS
DIASTOLIC BLOOD PRESSURE: 80 MMHG | BODY MASS INDEX: 26.87 KG/M2 | HEART RATE: 79 BPM | HEIGHT: 62 IN | OXYGEN SATURATION: 98 % | SYSTOLIC BLOOD PRESSURE: 120 MMHG | WEIGHT: 146 LBS

## 2019-09-05 DIAGNOSIS — H92.09 EARACHE: Primary | ICD-10-CM

## 2019-09-05 DIAGNOSIS — L30.9 DERMATITIS: ICD-10-CM

## 2019-09-05 DIAGNOSIS — N63.10 BREAST MASS, RIGHT: ICD-10-CM

## 2019-09-05 PROCEDURE — 99213 OFFICE O/P EST LOW 20 MIN: CPT | Performed by: INTERNAL MEDICINE

## 2019-09-05 NOTE — PROGRESS NOTES
HPI:    Patient ID: Hina Lyon is a 59year old female. HPI    Skin rashes on both hand , medial dorsal aspect . Tried OTC cortisone cream with no improvement. Mildly pruritic.     Chronic clicking sound on the left ear with pain with mild h 1000 MG Oral Cap Take  by mouth. Disp:  Rfl:    Multiple Vitamin (MULTI-VITAMINS) Oral Tab Take  by mouth. Disp:  Rfl:    Garlic 7350 MG Oral Cap Take  by mouth. Disp:  Rfl:    cetirizine (ZYRTEC) 10 MG Oral Tab Take 10 mg by mouth daily.  Disp:  Rfl:

## 2019-09-06 ENCOUNTER — TELEPHONE (OUTPATIENT)
Dept: OBGYN CLINIC | Facility: CLINIC | Age: 64
End: 2019-09-06

## 2019-09-06 ENCOUNTER — TELEPHONE (OUTPATIENT)
Dept: INTERNAL MEDICINE CLINIC | Facility: CLINIC | Age: 64
End: 2019-09-06

## 2019-09-06 ENCOUNTER — TELEPHONE (OUTPATIENT)
Dept: HEMATOLOGY/ONCOLOGY | Facility: HOSPITAL | Age: 64
End: 2019-09-06

## 2019-09-06 DIAGNOSIS — Z80.3 FAMILY HISTORY OF MALIGNANT NEOPLASM OF BREAST: ICD-10-CM

## 2019-09-06 DIAGNOSIS — C50.919 INFILTRATING DUCTAL CARCINOMA (HCC): Primary | ICD-10-CM

## 2019-09-06 NOTE — TELEPHONE ENCOUNTER
Referrals entered and awaiting auth for the following specialists: Dr. Kacy Nash (Oncology); Dr. Elyssa Estes (Surg-Onc); Joann Oliva ([employer] [facility] Dr. Kayla Denney a message informing her of the referrals being entered.     Regarding the following findings of the bx:     F

## 2019-09-06 NOTE — TELEPHONE ENCOUNTER
Patient is s/p right breast biopsy, performed at [address] [place] [address] 9/4/19. Patient has been referred to the Breast Biopsy Result Clinic by Dr. Katia Baptiste to receive pathology results from Radiologist and RN Navigator. Patient was phoned by Dr. Evelio Jeffries and myself.   Dr. Marciano Goss to speak with her  about his availability next week for appointment scheduling purposes. Patient asking that results be released to her. Will contact Dr. Caterina Chandler office and make this request on the patient's behalf.   Provided my contact informati

## 2019-09-06 NOTE — TELEPHONE ENCOUNTER
Received call from Dr. Janay Zavaleta (Pathology). Pt had breast Bx and was Dx with invasive duct carcinoma. Pt had lymph node Bx and carcinoma was not in lymph node. Message to CAP as PELON.

## 2019-09-06 NOTE — TELEPHONE ENCOUNTER
Pt had a R breast biopsy result indicated that pt has cancer. Pt was notified. Dr Francis refered her to Dr Joseph Varma and Dr Stephanie Eli, referrals are needed for both Tad. Additionally, a referral for Genetic counseling with Mikaela Torres.

## 2019-09-09 ENCOUNTER — TELEPHONE (OUTPATIENT)
Dept: HEMATOLOGY/ONCOLOGY | Facility: HOSPITAL | Age: 64
End: 2019-09-09

## 2019-09-09 NOTE — TELEPHONE ENCOUNTER
Patient calling sharing she has not yet received her pathology results released to [address] Shared with patient I have requested Dr. Alton Carmona office to release to you.     Read through all of patient's pathology report to her and answered all questions danny

## 2019-09-09 NOTE — TELEPHONE ENCOUNTER
Camille Thornton from the [facility] called. Pt is very upset as she has requested her pathology report be faxed to her at [phone] and also be released to [address]   Jason Lombardi states she is uncomfortable in releasing the results per request.  Jason Lombardi is r

## 2019-09-09 NOTE — TELEPHONE ENCOUNTER
I am uncertain here what is being requested of me. Please clarify as I cannot fax her results to her.

## 2019-09-10 ENCOUNTER — TELEPHONE (OUTPATIENT)
Dept: HEMATOLOGY/ONCOLOGY | Facility: HOSPITAL | Age: 64
End: 2019-09-10

## 2019-09-10 PROBLEM — C50.411 MALIGNANT NEOPLASM OF UPPER-OUTER QUADRANT OF RIGHT BREAST IN FEMALE, ESTROGEN RECEPTOR POSITIVE (HCC): Status: ACTIVE | Noted: 2019-09-10

## 2019-09-10 PROBLEM — Z17.0 MALIGNANT NEOPLASM OF UPPER-OUTER QUADRANT OF RIGHT BREAST IN FEMALE, ESTROGEN RECEPTOR POSITIVE: Status: ACTIVE | Noted: 2019-09-10

## 2019-09-10 PROBLEM — C50.411 MALIGNANT NEOPLASM OF UPPER-OUTER QUADRANT OF RIGHT BREAST IN FEMALE, ESTROGEN RECEPTOR POSITIVE: Status: ACTIVE | Noted: 2019-09-10

## 2019-09-10 PROBLEM — C50.411 MALIGNANT NEOPLASM OF UPPER-OUTER QUADRANT OF RIGHT BREAST IN FEMALE, ESTROGEN RECEPTOR POSITIVE  (HCC): Status: ACTIVE | Noted: 2019-09-10

## 2019-09-10 PROBLEM — Z17.0 MALIGNANT NEOPLASM OF UPPER-OUTER QUADRANT OF RIGHT BREAST IN FEMALE, ESTROGEN RECEPTOR POSITIVE  (HCC): Status: ACTIVE | Noted: 2019-09-10

## 2019-09-10 PROBLEM — Z17.0 MALIGNANT NEOPLASM OF UPPER-OUTER QUADRANT OF RIGHT BREAST IN FEMALE, ESTROGEN RECEPTOR POSITIVE (HCC): Status: ACTIVE | Noted: 2019-09-10

## 2019-09-10 NOTE — TELEPHONE ENCOUNTER
Patient phoned with questions concerning her appointment with Dr. Edmar Lucero for tomorrow. All appointment information was reinforced to patient. Patient shares she has not yet received copy of her pathology report from Dr. Jaun Silva office.   Acknowledged patient'

## 2019-09-11 ENCOUNTER — OFFICE VISIT (OUTPATIENT)
Dept: HEMATOLOGY/ONCOLOGY | Facility: HOSPITAL | Age: 64
End: 2019-09-11
Attending: INTERNAL MEDICINE
Payer: COMMERCIAL

## 2019-09-11 VITALS
HEART RATE: 85 BPM | SYSTOLIC BLOOD PRESSURE: 135 MMHG | BODY MASS INDEX: 26.5 KG/M2 | WEIGHT: 144 LBS | DIASTOLIC BLOOD PRESSURE: 69 MMHG | OXYGEN SATURATION: 96 % | TEMPERATURE: 99 F | HEIGHT: 62 IN | RESPIRATION RATE: 16 BRPM

## 2019-09-11 DIAGNOSIS — C50.411 MALIGNANT NEOPLASM OF UPPER-OUTER QUADRANT OF RIGHT BREAST IN FEMALE, ESTROGEN RECEPTOR POSITIVE (HCC): Primary | ICD-10-CM

## 2019-09-11 DIAGNOSIS — Z17.0 MALIGNANT NEOPLASM OF UPPER-OUTER QUADRANT OF RIGHT BREAST IN FEMALE, ESTROGEN RECEPTOR POSITIVE (HCC): Primary | ICD-10-CM

## 2019-09-11 PROCEDURE — 99245 OFF/OP CONSLTJ NEW/EST HI 55: CPT | Performed by: INTERNAL MEDICINE

## 2019-09-11 NOTE — TELEPHONE ENCOUNTER
Ok to release her results to my chart but I do not believe that we can fax records to pt without her having to go thru medical records to get a copy

## 2019-09-11 NOTE — PROGRESS NOTES
BERTRAM     Nelson Cochran is a 59year old female who is here today due to new diagnosis of Malignant neoplasm of upper-outer quadrant of right breast in female, estrogen receptor positive (hcc)  (primary encounter diagnosis)     The patient had her postmenopausal  Menses age 15. Menopause at age 52, natural menopause.    OCP use in the past:  No.    HRT use:  No  Fertility treatment:  No  Ethnic back ground:  [place]  Positive family h/o breast cancer.   First degree relatives:  2  She has a B atorvastatin 20 MG Oral Tab Take 1 tablet (20 mg total) by mouth once daily. Disp: 90 tablet Rfl: 3   Coenzyme Q10 100 MG Oral Cap Take 100 mg by mouth daily. Disp:  Rfl:    Vitamin C 500 MG Oral Tab Take 500 mg by mouth daily.  Disp:  Rfl:    Calcium Carb- Smoking status: Never Smoker      Smokeless tobacco: Never Used    Substance and Sexual Activity      Alcohol use: No      Drug use: No      Sexual activity: Not on file    Lifestyle      Physical activity:        Days per week: Not on file        Yari /69 (BP Location: Left arm, Patient Position: Sitting, Cuff Size: adult)   Pulse 85   Temp 99.1 °F (37.3 °C) (Oral)   Resp 16   Ht 1.575 m (5' 2\")   Wt 65.3 kg (144 lb)   LMP 10/03/1995 (Approximate)   SpO2 96%   BMI 26.34 kg/m²      Physical Exam Surgical options were reviewed. She may benefit from MRI to determine surgery options due to dense breasts. Discussed that she may be a candidate for breast conservation with a lumpectomy followed by RT to complete local control.   Given her positive fami Case will be presented at Multidisciplinary Breast Cancer Tumor Board. I will follow the patient at completion of surgery to review final pathology and staging and treatment options. No orders of the defined types were placed in this encounter. This diagnosis was conveyed to Dr. Brendon Bernal nurse, Adeel Porras, at 8:35 a.m. on 9/6/2019. This diagnosis was conveyed to breast navigator Debi London at 8:38 a.m. on 9/6/2019.      Tumor Markers by Immunostaining (Polymer based detection method) using the ASCO/C Specimen A is submitted in formalin labeled “Imer, right breast, 11 o'clock 5 cm from nipple x3 cores” and consists of three, pink to yellow-tan needle core biopsies of breast tissue, measuring in aggregate 1.5 x 1.0 x 0.2 cm.  The specimen is submitt TECHNIQUE:    Digital diagnostic mammography was performed and images were reviewed with the R2 AKIRA [de-identified] CAD device. 3D tomosynthesis was performed and reviewed.          BREAST COMPOSITION:          CATEGORY c- The breasts are heterogeneously dense, No mammographic evidence of malignancy in the left breast.     BI-RADS CATEGORY:     DIAGNOSTIC CATEGORY 5--HIGHLY SUGGESTIVE OF MALIGNANCY.        RECOMMENDATIONS:   ULTRASOUND-GUIDED CORE BIOPSY TWO SITE: RIGHT BREAST  AND RIGHT AXILLA

## 2019-09-12 ENCOUNTER — NURSE ONLY (OUTPATIENT)
Dept: HEMATOLOGY/ONCOLOGY | Facility: HOSPITAL | Age: 64
End: 2019-09-12
Attending: INTERNAL MEDICINE
Payer: COMMERCIAL

## 2019-09-12 ENCOUNTER — GENETICS ENCOUNTER (OUTPATIENT)
Dept: GENETICS | Facility: HOSPITAL | Age: 64
End: 2019-09-12
Attending: INTERNAL MEDICINE
Payer: COMMERCIAL

## 2019-09-12 ENCOUNTER — OFFICE VISIT (OUTPATIENT)
Dept: SURGERY | Facility: CLINIC | Age: 64
End: 2019-09-12

## 2019-09-12 VITALS
HEIGHT: 62 IN | WEIGHT: 144 LBS | HEART RATE: 73 BPM | DIASTOLIC BLOOD PRESSURE: 80 MMHG | BODY MASS INDEX: 26.5 KG/M2 | SYSTOLIC BLOOD PRESSURE: 138 MMHG | RESPIRATION RATE: 18 BRPM | OXYGEN SATURATION: 98 %

## 2019-09-12 DIAGNOSIS — Z80.3 FAMILY HISTORY OF MALIGNANT NEOPLASM OF BREAST: ICD-10-CM

## 2019-09-12 DIAGNOSIS — C50.411 MALIGNANT NEOPLASM OF UPPER-OUTER QUADRANT OF RIGHT BREAST IN FEMALE, ESTROGEN RECEPTOR POSITIVE (HCC): ICD-10-CM

## 2019-09-12 DIAGNOSIS — Z17.0 MALIGNANT NEOPLASM OF UPPER-OUTER QUADRANT OF RIGHT BREAST IN FEMALE, ESTROGEN RECEPTOR POSITIVE (HCC): Primary | ICD-10-CM

## 2019-09-12 DIAGNOSIS — Z17.0 MALIGNANT NEOPLASM OF UPPER-OUTER QUADRANT OF RIGHT BREAST IN FEMALE, ESTROGEN RECEPTOR POSITIVE (HCC): ICD-10-CM

## 2019-09-12 DIAGNOSIS — C50.411 MALIGNANT NEOPLASM OF UPPER-OUTER QUADRANT OF RIGHT BREAST IN FEMALE, ESTROGEN RECEPTOR POSITIVE (HCC): Primary | ICD-10-CM

## 2019-09-12 PROCEDURE — 99245 OFF/OP CONSLTJ NEW/EST HI 55: CPT | Performed by: SURGERY

## 2019-09-12 PROCEDURE — 96040 MEDICAL GENETICS COUNSELING EA 30 MIN: CPT | Performed by: GENETIC COUNSELOR, MS

## 2019-09-12 PROCEDURE — 36415 COLL VENOUS BLD VENIPUNCTURE: CPT

## 2019-09-12 NOTE — PROGRESS NOTES
Breast Surgery New Patient Consultation    This is the first visit for this 59year old woman, referred by Dr. Mike Alvarez, who presents for evaluation of breast cancer.     History of Present Illness:   Ms. Oren Oden is a 59year old woman who presents OTHER SURGICAL HISTORY Left 2012    Bone spur removal       Gynecological History:  Pt is nulliparous. She achieved menarche at age 15  Age of Menopause: 52  Type: Natural  She denies any history of hormone replacement therapy.   She denies any history of patient denies, fever, chills, night sweats, fatigue, generalized weakness, change in appetite or weight loss. HEENT:     The patient denies eye irritation, cataracts, redness, glaucoma, yellowing of the eyes, change in vision or color blindness.  The pa problems, coordination problems, trembling/tremors, fainting/black outs, dizziness, memory problems, loss of sensation/numbness, problems walking, weakness, tingling or burning in hands/feet.  There is no history of abusive relationship, bipolar disorder, s retraction. No nipple discharge can be elicited. The parenchyma is mildly nodular. There are no dominant masses in the breast. The axillary tail is normal.    Abdomen: The abdomen is soft, flat and non tender. The liver is not enlarged.  There are no palpa was also described, including the technique, risks and benefits of sentinel node biopsy, the possible need for axillary dissection, and the long-term sequelae of this procedure.  With regard to systemic therapy, final recommendation will be made following r

## 2019-09-12 NOTE — TELEPHONE ENCOUNTER
LISSETT that her path was released to [employer]. LISSETT to call us regarding it being faxed.

## 2019-09-12 NOTE — PROGRESS NOTES
Met with Mabel Anaya and her , Sury Claire, to review genetic testing options. Based upon her new diagnosis and the fact that she is now pending surgical management, she does want to move forward with genetic testing.  She elected to have a multi-gene panel giv

## 2019-09-12 NOTE — PATIENT INSTRUCTIONS
Surgeon: Dr Talbert Eron Number to Call: _______________________________________________  [phone]         Notes: ___________________________________________________________  [facility] [phone]    Procedure/Surgery: Right Breast localized l

## 2019-09-13 NOTE — TELEPHONE ENCOUNTER
Jhony Donald RN             This referral is approved by [facility].  The clinicals and referral have been forwarded to [facility] so have to wait for the approval.  If you have questions you can contact [facility] at [phone] option 4.      Thank yo

## 2019-09-13 NOTE — TELEPHONE ENCOUNTER
Dr. Juliette Edwards and Dr. Destiny Chisholm referrals were approved -- and patient has already consulted with them. Counselor Mandy HAYNES referral still open, and followed-up on status as patient consulted with her yesterday already (09/12/19) -- see referral notes.

## 2019-09-19 ENCOUNTER — TELEPHONE (OUTPATIENT)
Dept: GENETICS | Facility: HOSPITAL | Age: 64
End: 2019-09-19

## 2019-09-19 NOTE — TELEPHONE ENCOUNTER
Reported to pt voicemail and her 's voicemail that genetic testing results have yielded negative results for the genes associated with breast cancer. She opted for [facility] panel (35 genes) and this result is for the 10 breast-related genes.

## 2019-09-23 ENCOUNTER — TELEPHONE (OUTPATIENT)
Dept: GENETICS | Facility: HOSPITAL | Age: 64
End: 2019-09-23

## 2019-09-23 NOTE — TELEPHONE ENCOUNTER
Spoke to Kaye to review her final genetic testing results. Testing was performed for 35 genes through [facility]) and yielded 2 VUS results, one in AXIN2 (c.1807G>C) and one in [address] (c.920C>T).  Explained that no change in clinical management with

## 2019-09-24 ENCOUNTER — TELEPHONE (OUTPATIENT)
Dept: MAMMOGRAPHY | Facility: HOSPITAL | Age: 64
End: 2019-09-24

## 2019-09-24 NOTE — TELEPHONE ENCOUNTER
Received notice to contact Elisabeth Davis regarding seed loc vs wire. Procedure explained  Questions answered. She verbalizes she would rather not come in ahead of time for seed and would rather have wire placed day of surgery.  She had wanted me t

## 2019-09-24 NOTE — TELEPHONE ENCOUNTER
----- Message from Antonette Steven RN sent at 9/23/2019  3:36 PM CDT -----  Regarding: call regarding seed loc  We are going to be scheduling her for lumpectomy with SLN. Please call her to see if she is interested in seed loc ahead of time.      bryce

## 2019-09-24 NOTE — TELEPHONE ENCOUNTER
Called in to inform this rn that she will have seed placement \" I don\"t want my surgery delayed if this helps me get in sooner I will do it\" Informed Arun Cruz that having a wire placed versus a seed will not delay her surgery and that

## 2019-09-25 ENCOUNTER — OFFICE VISIT (OUTPATIENT)
Dept: AUDIOLOGY | Facility: CLINIC | Age: 64
End: 2019-09-25

## 2019-09-25 ENCOUNTER — TELEPHONE (OUTPATIENT)
Dept: SURGERY | Facility: CLINIC | Age: 64
End: 2019-09-25

## 2019-09-25 ENCOUNTER — OFFICE VISIT (OUTPATIENT)
Dept: OTOLARYNGOLOGY | Facility: CLINIC | Age: 64
End: 2019-09-25

## 2019-09-25 VITALS
HEIGHT: 62 IN | TEMPERATURE: 98 F | SYSTOLIC BLOOD PRESSURE: 144 MMHG | DIASTOLIC BLOOD PRESSURE: 83 MMHG | BODY MASS INDEX: 26.5 KG/M2 | WEIGHT: 144 LBS

## 2019-09-25 DIAGNOSIS — H90.3 SENSORINEURAL HEARING LOSS (SNHL) OF BOTH EARS: ICD-10-CM

## 2019-09-25 DIAGNOSIS — Z17.0 MALIGNANT NEOPLASM OF UPPER-OUTER QUADRANT OF RIGHT BREAST IN FEMALE, ESTROGEN RECEPTOR POSITIVE (HCC): Primary | ICD-10-CM

## 2019-09-25 DIAGNOSIS — H61.22 IMPACTED CERUMEN OF LEFT EAR: Primary | ICD-10-CM

## 2019-09-25 DIAGNOSIS — H92.02 REFERRED OTALGIA OF LEFT EAR: ICD-10-CM

## 2019-09-25 DIAGNOSIS — C50.411 MALIGNANT NEOPLASM OF UPPER-OUTER QUADRANT OF RIGHT BREAST IN FEMALE, ESTROGEN RECEPTOR POSITIVE (HCC): Primary | ICD-10-CM

## 2019-09-25 DIAGNOSIS — H69.92 EUSTACHIAN TUBE DISORDER, LEFT: Primary | ICD-10-CM

## 2019-09-25 PROBLEM — M26.609 TMJ (TEMPOROMANDIBULAR JOINT SYNDROME): Chronic | Status: ACTIVE | Noted: 2019-09-25

## 2019-09-25 PROCEDURE — 92557 COMPREHENSIVE HEARING TEST: CPT | Performed by: AUDIOLOGIST

## 2019-09-25 PROCEDURE — 69210 REMOVE IMPACTED EAR WAX UNI: CPT | Performed by: OTOLARYNGOLOGY

## 2019-09-25 PROCEDURE — 92567 TYMPANOMETRY: CPT | Performed by: AUDIOLOGIST

## 2019-09-25 PROCEDURE — 99203 OFFICE O/P NEW LOW 30 MIN: CPT | Performed by: OTOLARYNGOLOGY

## 2019-09-25 NOTE — PROGRESS NOTES
Pinky Markham is a 59year old female.  Patient presents with:  Ear Problem: c/o on and off  left ear pain for a while        HISTORY OF PRESENT ILLNESS  9/25/2019   Here for evaluation of left-sided discomfort and her ear associated with a crackli Asked        Blood Transfusions: Not Asked        Caffeine Concern: No        Occupational Exposure: Not Asked        Hobby Hazards: Not Asked        Sleep Concern: Not Asked        Stress Concern: Not Asked        Weight Concern: Not Asked        Special Integumentary Negative Frequent skin infections, pigment change and rash. Hema/Lymph Negative Easy bleeding and easy bruising.      PHYSICAL EXAM    /83   Temp 98.2 °F (36.8 °C) (Tympanic)   Ht 5' 2\" (1.575 m)   Wt 144 lb (65.3 kg)   LMP 10/03/19 Rfl: 3  •  Coenzyme Q10 100 MG Oral Cap, Take 100 mg by mouth daily. , Disp: , Rfl:   •  Vitamin C 500 MG Oral Tab, Take 500 mg by mouth daily. , Disp: , Rfl:   •  Calcium Carb-Cholecalciferol (CALCIUM 1000 + D) 1000-800 MG-UNIT Oral Tab, Take  by mouth., Rvain Del Cid

## 2019-09-25 NOTE — TELEPHONE ENCOUNTER
Pt contacted to schedule her surgery. [facility] [place] [address]     Pt prefers [facility], and to be scheduled on 10/8. Reviewed what the surgery is, and includes the sentinel lymph node biopsy. Questions addressed.

## 2019-09-26 NOTE — PROGRESS NOTES
AUDIOGRAM     Keri Correa was referred for testing by Alfonso Marsh for left sided ear pressure  8/29/1955  [id number]          Otoscopic inspection: right ear no cerumen; left ear no cerumen.        Tests/Procedures  Patient was tested via  stand

## 2019-10-04 RX ORDER — TURMERIC ROOT EXTRACT 500 MG
1 TABLET ORAL DAILY
COMMUNITY

## 2019-10-07 ENCOUNTER — TELEPHONE (OUTPATIENT)
Dept: INTERNAL MEDICINE CLINIC | Facility: CLINIC | Age: 64
End: 2019-10-07

## 2019-10-07 ENCOUNTER — TELEPHONE (OUTPATIENT)
Dept: HEMATOLOGY/ONCOLOGY | Facility: HOSPITAL | Age: 64
End: 2019-10-07

## 2019-10-07 NOTE — TELEPHONE ENCOUNTER
Phoned patient for reinforcement of pre operative education and to provide patient opportunity to address any questions or concerns related to her surgery day.     Patient is scheduled for right breast wire localized lumpectomy, right lymphoscintigraphy as

## 2019-10-07 NOTE — TELEPHONE ENCOUNTER
Left voicemail on nurse line    Patient asking for orders for EKG and DEXA scan. She states she has her surgery scheduled tomorrow and has overlooked these \"prerequisites\" for it. Please advise.

## 2019-10-08 ENCOUNTER — HOSPITAL ENCOUNTER (OUTPATIENT)
Dept: MAMMOGRAPHY | Facility: HOSPITAL | Age: 64
Discharge: HOME | End: 2019-10-08
Attending: SURGERY
Payer: COMMERCIAL

## 2019-10-08 ENCOUNTER — HOSPITAL ENCOUNTER (OUTPATIENT)
Dept: ULTRASOUND IMAGING | Facility: HOSPITAL | Age: 64
Discharge: HOME | End: 2019-10-08
Attending: SURGERY
Payer: COMMERCIAL

## 2019-10-08 ENCOUNTER — HOSPITAL ENCOUNTER (OUTPATIENT)
Facility: HOSPITAL | Age: 64
Setting detail: HOSPITAL OUTPATIENT SURGERY
Discharge: HOME | End: 2019-10-08
Attending: SURGERY | Admitting: SURGERY
Payer: COMMERCIAL

## 2019-10-08 ENCOUNTER — HOSPITAL ENCOUNTER (OUTPATIENT)
Dept: NUCLEAR MEDICINE | Facility: HOSPITAL | Age: 64
Discharge: HOME | End: 2019-10-08
Attending: SURGERY
Payer: COMMERCIAL

## 2019-10-08 ENCOUNTER — ANESTHESIA EVENT (OUTPATIENT)
Dept: SURGERY | Facility: HOSPITAL | Age: 64
End: 2019-10-08
Payer: COMMERCIAL

## 2019-10-08 ENCOUNTER — ANESTHESIA (OUTPATIENT)
Dept: SURGERY | Facility: HOSPITAL | Age: 64
End: 2019-10-08
Payer: COMMERCIAL

## 2019-10-08 ENCOUNTER — APPOINTMENT (OUTPATIENT)
Dept: MAMMOGRAPHY | Facility: HOSPITAL | Age: 64
End: 2019-10-08
Attending: SURGERY
Payer: COMMERCIAL

## 2019-10-08 VITALS — SYSTOLIC BLOOD PRESSURE: 144 MMHG | RESPIRATION RATE: 15 BRPM | HEART RATE: 65 BPM | DIASTOLIC BLOOD PRESSURE: 78 MMHG

## 2019-10-08 VITALS
RESPIRATION RATE: 15 BRPM | BODY MASS INDEX: 24.84 KG/M2 | DIASTOLIC BLOOD PRESSURE: 65 MMHG | SYSTOLIC BLOOD PRESSURE: 121 MMHG | HEIGHT: 62 IN | OXYGEN SATURATION: 96 % | WEIGHT: 135 LBS | HEART RATE: 63 BPM | TEMPERATURE: 98 F

## 2019-10-08 DIAGNOSIS — Z17.0 MALIGNANT NEOPLASM OF UPPER-OUTER QUADRANT OF RIGHT BREAST IN FEMALE, ESTROGEN RECEPTOR POSITIVE (HCC): ICD-10-CM

## 2019-10-08 DIAGNOSIS — C50.411 MALIGNANT NEOPLASM OF UPPER-OUTER QUADRANT OF RIGHT BREAST IN FEMALE, ESTROGEN RECEPTOR POSITIVE (HCC): ICD-10-CM

## 2019-10-08 PROCEDURE — 88307 TISSUE EXAM BY PATHOLOGIST: CPT | Performed by: SURGERY

## 2019-10-08 PROCEDURE — 76098 X-RAY EXAM SURGICAL SPECIMEN: CPT | Performed by: SURGERY

## 2019-10-08 PROCEDURE — 0HBT0ZZ EXCISION OF RIGHT BREAST, OPEN APPROACH: ICD-10-PCS | Performed by: SURGERY

## 2019-10-08 PROCEDURE — 88300 SURGICAL PATH GROSS: CPT | Performed by: SURGERY

## 2019-10-08 PROCEDURE — 07B50ZX EXCISION OF RIGHT AXILLARY LYMPHATIC, OPEN APPROACH, DIAGNOSTIC: ICD-10-PCS | Performed by: SURGERY

## 2019-10-08 PROCEDURE — 78195 LYMPH SYSTEM IMAGING: CPT | Performed by: SURGERY

## 2019-10-08 PROCEDURE — 19285 PERQ DEV BREAST 1ST US IMAG: CPT | Performed by: SURGERY

## 2019-10-08 PROCEDURE — 88341 IMHCHEM/IMCYTCHM EA ADD ANTB: CPT | Performed by: SURGERY

## 2019-10-08 PROCEDURE — 3E0W3KZ INTRODUCTION OF OTHER DIAGNOSTIC SUBSTANCE INTO LYMPHATICS, PERCUTANEOUS APPROACH: ICD-10-PCS | Performed by: SURGERY

## 2019-10-08 PROCEDURE — 88342 IMHCHEM/IMCYTCHM 1ST ANTB: CPT | Performed by: SURGERY

## 2019-10-08 PROCEDURE — 77065 DX MAMMO INCL CAD UNI: CPT | Performed by: SURGERY

## 2019-10-08 PROCEDURE — 88363 XM ARCHIVE TISSUE MOLEC ANAL: CPT | Performed by: SURGERY

## 2019-10-08 RX ORDER — FAMOTIDINE 20 MG/1
20 TABLET ORAL ONCE
Status: DISCONTINUED | OUTPATIENT
Start: 2019-10-08 | End: 2019-10-08 | Stop reason: HOSPADM

## 2019-10-08 RX ORDER — ONDANSETRON 2 MG/ML
INJECTION INTRAMUSCULAR; INTRAVENOUS AS NEEDED
Status: DISCONTINUED | OUTPATIENT
Start: 2019-10-08 | End: 2019-10-08 | Stop reason: SURG

## 2019-10-08 RX ORDER — MORPHINE SULFATE 10 MG/ML
6 INJECTION, SOLUTION INTRAMUSCULAR; INTRAVENOUS EVERY 10 MIN PRN
Status: DISCONTINUED | OUTPATIENT
Start: 2019-10-08 | End: 2019-10-08

## 2019-10-08 RX ORDER — MORPHINE SULFATE 2 MG/ML
2 INJECTION, SOLUTION INTRAMUSCULAR; INTRAVENOUS EVERY 10 MIN PRN
Status: DISCONTINUED | OUTPATIENT
Start: 2019-10-08 | End: 2019-10-08

## 2019-10-08 RX ORDER — HYDROMORPHONE HYDROCHLORIDE 1 MG/ML
0.6 INJECTION, SOLUTION INTRAMUSCULAR; INTRAVENOUS; SUBCUTANEOUS EVERY 5 MIN PRN
Status: DISCONTINUED | OUTPATIENT
Start: 2019-10-08 | End: 2019-10-08

## 2019-10-08 RX ORDER — HYDROCODONE BITARTRATE AND ACETAMINOPHEN 5; 325 MG/1; MG/1
2 TABLET ORAL AS NEEDED
Status: DISCONTINUED | OUTPATIENT
Start: 2019-10-08 | End: 2019-10-08

## 2019-10-08 RX ORDER — SODIUM CHLORIDE, SODIUM LACTATE, POTASSIUM CHLORIDE, CALCIUM CHLORIDE 600; 310; 30; 20 MG/100ML; MG/100ML; MG/100ML; MG/100ML
INJECTION, SOLUTION INTRAVENOUS CONTINUOUS
Status: DISCONTINUED | OUTPATIENT
Start: 2019-10-08 | End: 2019-10-08

## 2019-10-08 RX ORDER — DEXAMETHASONE SODIUM PHOSPHATE 4 MG/ML
VIAL (ML) INJECTION AS NEEDED
Status: DISCONTINUED | OUTPATIENT
Start: 2019-10-08 | End: 2019-10-08 | Stop reason: SURG

## 2019-10-08 RX ORDER — CEFAZOLIN SODIUM/WATER 2 G/20 ML
2 SYRINGE (ML) INTRAVENOUS ONCE
Status: COMPLETED | OUTPATIENT
Start: 2019-10-08 | End: 2019-10-08

## 2019-10-08 RX ORDER — HALOPERIDOL 5 MG/ML
0.25 INJECTION INTRAMUSCULAR ONCE AS NEEDED
Status: DISCONTINUED | OUTPATIENT
Start: 2019-10-08 | End: 2019-10-08

## 2019-10-08 RX ORDER — ONDANSETRON 2 MG/ML
4 INJECTION INTRAMUSCULAR; INTRAVENOUS ONCE AS NEEDED
Status: DISCONTINUED | OUTPATIENT
Start: 2019-10-08 | End: 2019-10-08

## 2019-10-08 RX ORDER — NALOXONE HYDROCHLORIDE 0.4 MG/ML
80 INJECTION, SOLUTION INTRAMUSCULAR; INTRAVENOUS; SUBCUTANEOUS AS NEEDED
Status: DISCONTINUED | OUTPATIENT
Start: 2019-10-08 | End: 2019-10-08

## 2019-10-08 RX ORDER — METOCLOPRAMIDE 10 MG/1
10 TABLET ORAL ONCE
Status: DISCONTINUED | OUTPATIENT
Start: 2019-10-08 | End: 2019-10-08 | Stop reason: HOSPADM

## 2019-10-08 RX ORDER — MIDAZOLAM HYDROCHLORIDE 1 MG/ML
INJECTION INTRAMUSCULAR; INTRAVENOUS AS NEEDED
Status: DISCONTINUED | OUTPATIENT
Start: 2019-10-08 | End: 2019-10-08 | Stop reason: SURG

## 2019-10-08 RX ORDER — PROCHLORPERAZINE EDISYLATE 5 MG/ML
5 INJECTION INTRAMUSCULAR; INTRAVENOUS ONCE AS NEEDED
Status: DISCONTINUED | OUTPATIENT
Start: 2019-10-08 | End: 2019-10-08

## 2019-10-08 RX ORDER — LIDOCAINE HYDROCHLORIDE 10 MG/ML
INJECTION, SOLUTION EPIDURAL; INFILTRATION; INTRACAUDAL; PERINEURAL AS NEEDED
Status: DISCONTINUED | OUTPATIENT
Start: 2019-10-08 | End: 2019-10-08 | Stop reason: SURG

## 2019-10-08 RX ORDER — HYDROMORPHONE HYDROCHLORIDE 1 MG/ML
0.4 INJECTION, SOLUTION INTRAMUSCULAR; INTRAVENOUS; SUBCUTANEOUS EVERY 5 MIN PRN
Status: DISCONTINUED | OUTPATIENT
Start: 2019-10-08 | End: 2019-10-08

## 2019-10-08 RX ORDER — METHYLENE BLUE 10 MG/ML
INJECTION INTRAVENOUS AS NEEDED
Status: DISCONTINUED | OUTPATIENT
Start: 2019-10-08 | End: 2019-10-08 | Stop reason: HOSPADM

## 2019-10-08 RX ORDER — MORPHINE SULFATE 4 MG/ML
4 INJECTION, SOLUTION INTRAMUSCULAR; INTRAVENOUS EVERY 10 MIN PRN
Status: DISCONTINUED | OUTPATIENT
Start: 2019-10-08 | End: 2019-10-08

## 2019-10-08 RX ORDER — LIDOCAINE HYDROCHLORIDE AND EPINEPHRINE 10; 10 MG/ML; UG/ML
INJECTION, SOLUTION INFILTRATION; PERINEURAL AS NEEDED
Status: DISCONTINUED | OUTPATIENT
Start: 2019-10-08 | End: 2019-10-08 | Stop reason: HOSPADM

## 2019-10-08 RX ORDER — HYDROMORPHONE HYDROCHLORIDE 1 MG/ML
0.2 INJECTION, SOLUTION INTRAMUSCULAR; INTRAVENOUS; SUBCUTANEOUS EVERY 5 MIN PRN
Status: DISCONTINUED | OUTPATIENT
Start: 2019-10-08 | End: 2019-10-08

## 2019-10-08 RX ORDER — HYDROCODONE BITARTRATE AND ACETAMINOPHEN 5; 325 MG/1; MG/1
1-2 TABLET ORAL EVERY 6 HOURS PRN
Qty: 30 TABLET | Refills: 0 | Status: SHIPPED | OUTPATIENT
Start: 2019-10-08 | End: 2020-01-08 | Stop reason: ALTCHOICE

## 2019-10-08 RX ORDER — HYDROCODONE BITARTRATE AND ACETAMINOPHEN 5; 325 MG/1; MG/1
1 TABLET ORAL AS NEEDED
Status: DISCONTINUED | OUTPATIENT
Start: 2019-10-08 | End: 2019-10-08

## 2019-10-08 RX ORDER — ACETAMINOPHEN 500 MG
1000 TABLET ORAL ONCE
Status: COMPLETED | OUTPATIENT
Start: 2019-10-08 | End: 2019-10-08

## 2019-10-08 RX ORDER — EPHEDRINE SULFATE 50 MG/ML
INJECTION, SOLUTION INTRAVENOUS AS NEEDED
Status: DISCONTINUED | OUTPATIENT
Start: 2019-10-08 | End: 2019-10-08 | Stop reason: SURG

## 2019-10-08 RX ORDER — BUPIVACAINE HYDROCHLORIDE 5 MG/ML
INJECTION, SOLUTION EPIDURAL; INTRACAUDAL AS NEEDED
Status: DISCONTINUED | OUTPATIENT
Start: 2019-10-08 | End: 2019-10-08 | Stop reason: HOSPADM

## 2019-10-08 RX ADMIN — ONDANSETRON 4 MG: 2 INJECTION INTRAMUSCULAR; INTRAVENOUS at 13:14:00

## 2019-10-08 RX ADMIN — LIDOCAINE HYDROCHLORIDE 50 MG: 10 INJECTION, SOLUTION EPIDURAL; INFILTRATION; INTRACAUDAL; PERINEURAL at 13:14:00

## 2019-10-08 RX ADMIN — EPHEDRINE SULFATE 10 MG: 50 INJECTION, SOLUTION INTRAVENOUS at 13:41:00

## 2019-10-08 RX ADMIN — SODIUM CHLORIDE, SODIUM LACTATE, POTASSIUM CHLORIDE, CALCIUM CHLORIDE: 600; 310; 30; 20 INJECTION, SOLUTION INTRAVENOUS at 13:14:00

## 2019-10-08 RX ADMIN — CEFAZOLIN SODIUM/WATER 2 G: 2 G/20 ML SYRINGE (ML) INTRAVENOUS at 13:20:00

## 2019-10-08 RX ADMIN — DEXAMETHASONE SODIUM PHOSPHATE 4 MG: 4 MG/ML VIAL (ML) INJECTION at 13:14:00

## 2019-10-08 RX ADMIN — SODIUM CHLORIDE, SODIUM LACTATE, POTASSIUM CHLORIDE, CALCIUM CHLORIDE: 600; 310; 30; 20 INJECTION, SOLUTION INTRAVENOUS at 14:03:00

## 2019-10-08 RX ADMIN — MIDAZOLAM HYDROCHLORIDE 2 MG: 1 INJECTION INTRAMUSCULAR; INTRAVENOUS at 13:07:00

## 2019-10-08 NOTE — BRIEF OP NOTE
Pre-Operative Diagnosis: Malignant neoplasm of upper-outer quadrant of right breast in female, estrogen receptor [facility] [C50.411, Z17.0]     Post-Operative Diagnosis: Malignant neoplasm of upper-outer quadrant of right breast in female, estrogen re

## 2019-10-08 NOTE — OR PREOP
Patient returned from nuclear medicine in stable condition. Dressing to right breast dry and intact. No c/o's. Family at bedside.

## 2019-10-08 NOTE — H&P
History of Present Illness:   Ms. Mylene Mtz is a 59year old woman who presents with a self detected mass in the right breast.  She reports that she felt this about a month ago. She has no personal prior history of breast disease or biopsies. 15  Age of Menopause: 52  Type: Natural  She denies any history of hormone replacement therapy. She denies any history of oral contraceptive use.   She denies infertility treatment to achieve pregnancy.     Medications:       Risedronate Sodium 150 MG Oral weight loss.     HEENT:     The patient denies eye irritation, cataracts, redness, glaucoma, yellowing of the eyes, change in vision or color blindness.  The patient denies hearing loss, ringing in the ears, ear drainage, earaches,+ nasal congestion, nose b problems, loss of sensation/numbness, problems walking, weakness, tingling or burning in hands/feet. There is no history of abusive relationship, bipolar disorder, sleep disturbance, anxiety, depression or feeling of despair.     Endocrine:     There is no are no dominant masses in the breast. The axillary tail is normal.     Abdomen: The abdomen is soft, flat and non tender. The liver is not enlarged. There are no palpable masses.     Lymph Nodes:   The supraclavicular, axillary and cervical regions are altaf biopsy, the possible need for axillary dissection, and the long-term sequelae of this procedure.  With regard to systemic therapy, final recommendation will be made following receipt of final pathology post-op, but I outlined the possibility of endocrine th the likelihood of the patient achieving goals; and potential problems that might occur during recuperation.   I discussed reasonable alternatives to the procedure, including risks, benefits and side effects related to the alternatives and risks related to n

## 2019-10-08 NOTE — IMAGING NOTE
0940 Pt  To ultrasound department scouts completed by Ruben Miller us tech     7900 Hx taken procedure explained questions answered. Order verified and initialed by all staff members .   Consent signed and verified at [address] ARRIVAL.     0900 Consent s

## 2019-10-08 NOTE — ANESTHESIA PROCEDURE NOTES
Airway  Urgency: elective    Airway not difficult    General Information and Staff    Patient location during procedure: OR  Anesthesiologist: Luba Boudreaux MD  Resident/CRNA: Juan Antonio Steiner CRNA  Performed: CRNA     Indications and Patient Condi

## 2019-10-08 NOTE — OPERATIVE REPORT
[facility]    PATIENT'S NAME: Tommy Guerra   ATTENDING PHYSICIAN: Chinyere Garcia. Kristel Piper MD   OPERATING PHYSICIAN: Chinyere Garcia.  Kristel Piper MD   PATIENT ACCOUNT#:   [id number]    LOCATION:  [facility] 6 [facility] 10  MEDICAL RECORD #:   [id number] and possible complications including, but not limited to infection, bleeding, injury to surrounding structures, possible need for reoperation were discussed with the patient at length, and she agreed to the proposed surgery.     OPERATIVE TECHNIQUE:  Arnol postoperative analgesia. Attention was then taken toward the breast where 1% lidocaine with epinephrine was used to infiltrate the skin and subcutaneous tissues at her lumpectomy incision site.   A curvilinear incision was made with a 15 blade knife in the cavity to assist with postoperative analgesia. A sterile dressing and compression bra were placed. Her blood loss was minimal.  All counts were correct at the conclusion of procedure. She tolerated procedure well.   She was transferred to the recovery ar

## 2019-10-08 NOTE — PROCEDURES
[facility]  Procedure Note    Oksana Sorto Patient Status:  Outpatient    1955 MRN [id number]   Location [place] [address] Attending Trav Rivers MD   Hosp Day # 0 PCP Isaura Salcido MD     Procedure: Nikko Corcoran

## 2019-10-08 NOTE — ANESTHESIA PREPROCEDURE EVALUATION
Anesthesia PreOp Note    HPI:     Andre Tony is a 59year old female who presents for preoperative consultation requested by: Tania Fair MD    Date of Surgery: 10/8/2019    Procedure(s):  BREAST LUMPECTOMY  BREAST SENTINEL LYMPH NODE BI Date Noted: 04/22/2008      Brachial neuritis         Date Noted: 02/05/2008      Allergic rhinitis due to pollen         Date Noted: 01/11/2008      Hypertrophy of nasal turbinates         Date Noted: 01/11/2008      Allergic rhinitis         Date No 500 MG Oral Tab Take 500 mg by mouth daily. Disp:  Rfl:  9/24/2019   omega-3 fatty acids (FISH OIL) 1000 MG Oral Cap Take  by mouth. Disp:  Rfl:  9/24/2019   Multiple Vitamin (MULTI-VITAMINS) Oral Tab Take  by mouth.  Disp:  Rfl:  5/15/9342   Garlic 2445 MG file      Food insecurity:        Worry: Not on file        Inability: Not on file      Transportation needs:        Medical: Not on file        Non-medical: Not on file    Tobacco Use      Smoking status: Never Smoker      Smokeless tobacco: Never Used °C)   TempSrc:   Oral   SpO2:   99%   Weight: 61.2 kg (135 lb) 61.2 kg (135 lb)    Height: 1.575 m (5' 2\") 1.575 m (5' 2\")         Anesthesia Evaluation     Patient summary reviewed and Nursing notes reviewed    History of anesthetic complications   Bharath

## 2019-10-09 ENCOUNTER — TELEPHONE (OUTPATIENT)
Dept: SURGERY | Facility: CLINIC | Age: 64
End: 2019-10-09

## 2019-10-09 NOTE — TELEPHONE ENCOUNTER
Pt phoned in to see if the results of the LN were available. I let her know the pathology result is not back yet. I let her know we would call when available. She is feeling alright,\"just pain\".    Pt advised to keep the compression bra on until we see

## 2019-10-11 ENCOUNTER — TELEPHONE (OUTPATIENT)
Dept: SURGERY | Facility: CLINIC | Age: 64
End: 2019-10-11

## 2019-10-11 NOTE — TELEPHONE ENCOUNTER
LVM on , and pt's cell, letting them know the margins are negative, and the LN is normal.   Asked to call back with any other questions.

## 2019-10-15 ENCOUNTER — NURSE NAVIGATOR ENCOUNTER (OUTPATIENT)
Dept: HEMATOLOGY/ONCOLOGY | Facility: HOSPITAL | Age: 64
End: 2019-10-15

## 2019-10-15 NOTE — PROGRESS NOTES
Breast Surgery Post-Operative Visit    Diagnosis:  Right invasive ductal carcinoma status post right lumpectomy with sentinel lymph node biopsy on October 8, 2019.     Stage: Cancer Staging  Malignant neoplasm of upper-outer quadrant of right breast in fema biopsy on October 8, 3293 without complications. She denies any fevers, chills, erythema or drainage from the incision. She is here today for evaluation and recommendations for further therapy.         Past Medical History:   Diagnosis Date   • Chicken pox NEEDED, Disp: 90 tablet, Rfl: 2  atorvastatin 20 MG Oral Tab, Take 1 tablet (20 mg total) by mouth once daily. , Disp: 90 tablet, Rfl: 3  Coenzyme Q10 100 MG Oral Cap, Take 100 mg by mouth daily. , Disp: , Rfl:   Vitamin C 500 MG Oral Tab, Take 500 mg by geno in voice, facial trauma.     Respiratory:  The patient denies chronic cough, phlegm, hemoptysis, pleurisy/chest pain, pneumonia, asthma, wheezing, difficulty in breathing with exertion, emphysema, chronic bronchitis, shortness of breath or abnormal sound wh hormone problems, cold intolerance, thyroid disease. Allergic/Immunologic:  There is no history of hives, hay fever, angioedema or anaphylaxis.      10/16/19  1515   BP: 142/85   Pulse: 74   Resp: 20   Temp: 98.1 °F (36.7 °C)     Physical Exam:  Examina contact the office with any questions/concerns prior to her next appointment.

## 2019-10-16 ENCOUNTER — OFFICE VISIT (OUTPATIENT)
Dept: SURGERY | Facility: CLINIC | Age: 64
End: 2019-10-16

## 2019-10-16 VITALS
TEMPERATURE: 98 F | DIASTOLIC BLOOD PRESSURE: 85 MMHG | RESPIRATION RATE: 20 BRPM | SYSTOLIC BLOOD PRESSURE: 142 MMHG | HEART RATE: 74 BPM

## 2019-10-16 DIAGNOSIS — C50.411 MALIGNANT NEOPLASM OF UPPER-OUTER QUADRANT OF RIGHT BREAST IN FEMALE, ESTROGEN RECEPTOR POSITIVE (HCC): Primary | ICD-10-CM

## 2019-10-16 DIAGNOSIS — Z17.0 MALIGNANT NEOPLASM OF UPPER-OUTER QUADRANT OF RIGHT BREAST IN FEMALE, ESTROGEN RECEPTOR POSITIVE (HCC): Primary | ICD-10-CM

## 2019-10-16 PROCEDURE — 99024 POSTOP FOLLOW-UP VISIT: CPT | Performed by: SURGERY

## 2019-10-24 ENCOUNTER — OFFICE VISIT (OUTPATIENT)
Dept: INTERNAL MEDICINE CLINIC | Facility: CLINIC | Age: 64
End: 2019-10-24

## 2019-10-24 ENCOUNTER — HOSPITAL ENCOUNTER (OUTPATIENT)
Dept: GENERAL RADIOLOGY | Facility: HOSPITAL | Age: 64
Discharge: HOME | End: 2019-10-24
Attending: INTERNAL MEDICINE
Payer: COMMERCIAL

## 2019-10-24 ENCOUNTER — APPOINTMENT (OUTPATIENT)
Dept: LAB | Facility: HOSPITAL | Age: 64
End: 2019-10-24
Attending: INTERNAL MEDICINE
Payer: COMMERCIAL

## 2019-10-24 VITALS
BODY MASS INDEX: 26.68 KG/M2 | SYSTOLIC BLOOD PRESSURE: 122 MMHG | RESPIRATION RATE: 19 BRPM | HEIGHT: 62 IN | WEIGHT: 145 LBS | HEART RATE: 78 BPM | DIASTOLIC BLOOD PRESSURE: 70 MMHG

## 2019-10-24 DIAGNOSIS — Z00.00 GENERAL MEDICAL EXAM: ICD-10-CM

## 2019-10-24 DIAGNOSIS — Z78.0 POSTMENOPAUSAL: ICD-10-CM

## 2019-10-24 DIAGNOSIS — E55.9 VITAMIN D DEFICIENCY: ICD-10-CM

## 2019-10-24 DIAGNOSIS — Z23 NEED FOR PROPHYLACTIC VACCINATION AGAINST STREPTOCOCCUS PNEUMONIAE (PNEUMOCOCCUS): ICD-10-CM

## 2019-10-24 DIAGNOSIS — R76.11 PPD POSITIVE: ICD-10-CM

## 2019-10-24 DIAGNOSIS — E78.2 MIXED HYPERLIPIDEMIA: ICD-10-CM

## 2019-10-24 DIAGNOSIS — Z00.00 GENERAL MEDICAL EXAM: Primary | ICD-10-CM

## 2019-10-24 DIAGNOSIS — C50.411 MALIGNANT NEOPLASM OF UPPER-OUTER QUADRANT OF RIGHT BREAST IN FEMALE, ESTROGEN RECEPTOR POSITIVE (HCC): ICD-10-CM

## 2019-10-24 DIAGNOSIS — R73.01 FASTING HYPERGLYCEMIA: ICD-10-CM

## 2019-10-24 DIAGNOSIS — Z17.0 MALIGNANT NEOPLASM OF UPPER-OUTER QUADRANT OF RIGHT BREAST IN FEMALE, ESTROGEN RECEPTOR POSITIVE (HCC): ICD-10-CM

## 2019-10-24 DIAGNOSIS — Z23 NEEDS FLU SHOT: ICD-10-CM

## 2019-10-24 PROCEDURE — 90686 IIV4 VACC NO PRSV 0.5 ML IM: CPT | Performed by: INTERNAL MEDICINE

## 2019-10-24 PROCEDURE — 71046 X-RAY EXAM CHEST 2 VIEWS: CPT | Performed by: INTERNAL MEDICINE

## 2019-10-24 PROCEDURE — 90670 PCV13 VACCINE IM: CPT | Performed by: INTERNAL MEDICINE

## 2019-10-24 PROCEDURE — 93010 ELECTROCARDIOGRAM REPORT: CPT | Performed by: INTERNAL MEDICINE

## 2019-10-24 PROCEDURE — 90471 IMMUNIZATION ADMIN: CPT | Performed by: INTERNAL MEDICINE

## 2019-10-24 PROCEDURE — 93005 ELECTROCARDIOGRAM TRACING: CPT

## 2019-10-24 PROCEDURE — 99396 PREV VISIT EST AGE 40-64: CPT | Performed by: INTERNAL MEDICINE

## 2019-10-24 PROCEDURE — 90472 IMMUNIZATION ADMIN EACH ADD: CPT | Performed by: INTERNAL MEDICINE

## 2019-10-24 NOTE — PROGRESS NOTES
Asif Olea is a 59year old female who presents for a complete physical exam. She  denies discharge, itching, burning or dysuria, is menopausal.     She had neg pap and HPV on 8/20/19 per JENI. Denis Steven Page.      Patient recently diagnosed to have 3 tablet, Rfl: 3  Naproxen Sodium 220 MG Oral Cap, Take 220 mg by mouth., Disp: , Rfl:   hydrochlorothiazide 25 MG Oral Tab, TAKE 1 TABLET BY MOUTH EVERY DAY AS NEEDED, Disp: 90 tablet, Rfl: 2  atorvastatin 20 MG Oral Tab, Take 1 tablet (20 mg total) by mo (gastro-intestinal cancer) Father 80        d.84; non-smoker   • Other (gastric cancer) Brother 61        fmr smoker   • Cancer Paternal Aunt         cervical ca   • Stroke Mother    • Breast Cancer Sister 68   • Prostate Cancer Brother 62      Social Hist back  EXTREMITIES: no cyanosis, clubbing or edema  NEURO: Oriented times three,cranial nerves are intact,motor and sensory are grossly intact    ASSESSMENT AND PLAN:   Negar Cabrales is a 59year old female who presents for a complete physical exam

## 2019-10-26 DIAGNOSIS — E78.5 HYPERLIPIDEMIA, UNSPECIFIED HYPERLIPIDEMIA TYPE: ICD-10-CM

## 2019-10-26 NOTE — PATIENT INSTRUCTIONS
Healthy Diet and Regular Exercise  [employer] [facility] [employer] for making healthy food choices    Enjoy your food, but eat less. Fully enjoy your food when eating. Don’t eat while distracted and slow down. Avoid over sized portions.    Don’t

## 2019-10-27 NOTE — PROGRESS NOTES
Breast Surgery Surveillance Visit    Diagnosis:  Right invasive ductal carcinoma status post right lumpectomy with sentinel lymph node biopsy on October 8, 2019.     Stage: Cancer Staging  Malignant neoplasm of upper-outer quadrant of right breast in female she had panel testing from [facility] . Norma Ann called her with testing results on September 23, 2019. Testing was performed for 35 genes through [facility]) and yielded 2 VUS results, one in AXIN2 (c.1807G>C) and one in [address] (c.920C>T).  Exp age 15  Age of Menopause: 52  Type: Natural  She denies any history of hormone replacement therapy. She denies any history of oral contraceptive use. She denies infertility treatment to achieve pregnancy.     Medications:    atorvastatin 20 MG Oral Tab, T ancestry. Social History:    Alcohol use No         Smoking status: Never Smoker   Smokeless tobacco: Never Used   The patient is . She has no children. She is a retired dialysis nurse.     Review of Systems:  General:   The patient leslieiesjim nodes.     Musculoskeletal:  The patient denies muscle aches/pain, joint pain, stiff joints, neck pain, back pain or bone pain.     Neuropsychiatric:  There is no history of migraines or severe headaches, seizure/epilepsy, speech problems, coordination prob ER positive nature of her disease. She is cleared to proceed with radiation. She will need imaging of the right breast in approximately 6 months following her first set of imaging.   I encouraged her to continue monitoring her ROM and strength and explain

## 2019-10-28 RX ORDER — ATORVASTATIN CALCIUM 20 MG/1
20 TABLET, FILM COATED ORAL
Qty: 90 TABLET | Refills: 3 | Status: SHIPPED | OUTPATIENT
Start: 2019-10-28 | End: 2020-10-26

## 2019-10-30 ENCOUNTER — OFFICE VISIT (OUTPATIENT)
Dept: SURGERY | Facility: CLINIC | Age: 64
End: 2019-10-30

## 2019-10-30 ENCOUNTER — APPOINTMENT (OUTPATIENT)
Dept: LAB | Facility: REFERENCE LAB | Age: 64
End: 2019-10-30
Attending: INTERNAL MEDICINE
Payer: COMMERCIAL

## 2019-10-30 ENCOUNTER — OFFICE VISIT (OUTPATIENT)
Dept: HEMATOLOGY/ONCOLOGY | Facility: HOSPITAL | Age: 64
End: 2019-10-30
Attending: INTERNAL MEDICINE
Payer: COMMERCIAL

## 2019-10-30 VITALS
TEMPERATURE: 98 F | OXYGEN SATURATION: 98 % | HEIGHT: 62 IN | RESPIRATION RATE: 16 BRPM | WEIGHT: 143 LBS | HEART RATE: 72 BPM | BODY MASS INDEX: 26.31 KG/M2 | DIASTOLIC BLOOD PRESSURE: 70 MMHG | SYSTOLIC BLOOD PRESSURE: 132 MMHG

## 2019-10-30 VITALS
SYSTOLIC BLOOD PRESSURE: 132 MMHG | WEIGHT: 143 LBS | OXYGEN SATURATION: 98 % | TEMPERATURE: 98 F | HEART RATE: 72 BPM | RESPIRATION RATE: 16 BRPM | BODY MASS INDEX: 26.31 KG/M2 | DIASTOLIC BLOOD PRESSURE: 70 MMHG | HEIGHT: 62 IN

## 2019-10-30 DIAGNOSIS — Z00.00 GENERAL MEDICAL EXAM: ICD-10-CM

## 2019-10-30 DIAGNOSIS — E55.9 VITAMIN D DEFICIENCY: ICD-10-CM

## 2019-10-30 DIAGNOSIS — C50.411 MALIGNANT NEOPLASM OF UPPER-OUTER QUADRANT OF RIGHT BREAST IN FEMALE, ESTROGEN RECEPTOR POSITIVE (HCC): Primary | ICD-10-CM

## 2019-10-30 DIAGNOSIS — E78.2 MIXED HYPERLIPIDEMIA: ICD-10-CM

## 2019-10-30 DIAGNOSIS — R73.01 FASTING HYPERGLYCEMIA: ICD-10-CM

## 2019-10-30 DIAGNOSIS — Z17.0 MALIGNANT NEOPLASM OF UPPER-OUTER QUADRANT OF RIGHT BREAST IN FEMALE, ESTROGEN RECEPTOR POSITIVE (HCC): Primary | ICD-10-CM

## 2019-10-30 PROCEDURE — 83036 HEMOGLOBIN GLYCOSYLATED A1C: CPT

## 2019-10-30 PROCEDURE — 80061 LIPID PANEL: CPT

## 2019-10-30 PROCEDURE — 85025 COMPLETE CBC W/AUTO DIFF WBC: CPT

## 2019-10-30 PROCEDURE — 80053 COMPREHEN METABOLIC PANEL: CPT

## 2019-10-30 PROCEDURE — 99213 OFFICE O/P EST LOW 20 MIN: CPT | Performed by: INTERNAL MEDICINE

## 2019-10-30 PROCEDURE — 36415 COLL VENOUS BLD VENIPUNCTURE: CPT

## 2019-10-30 PROCEDURE — 99024 POSTOP FOLLOW-UP VISIT: CPT | Performed by: SURGERY

## 2019-10-30 PROCEDURE — 82306 VITAMIN D 25 HYDROXY: CPT

## 2019-10-30 NOTE — PROGRESS NOTES
BERTRAM Rosales is a 59year old female who is here today for follow-up of diagnosis of Malignant neoplasm of upper-outer quadrant of right breast in female, estrogen receptor positive ([facility])  (primary encounter diagnosis)     Patient here • PPD positive      Past Surgical History:   Procedure Laterality Date   • BREAST LUMPECTOMY Right 10/8/2019    Performed by Estefania Tamayo MD at [address] [place] [address] OR   • BREAST SENTINEL LYMPH NODE BIOPSY Right 10/8/2019    Performed by Estefania Tamayo MD cM0, G3, ER+, NY+, HER2-) - Signed by Griffin Redding MD on 9/11/2019  - Pathologic stage from 10/30/2019: Stage IA (pT1c, pN0(sn), cM0, G3, ER+, NY+, HER2-, Oncotype DX score: 18) - Signed by Griffin Redding MD on 10/30/2019      Frieda Puentes is

## 2019-11-01 ENCOUNTER — APPOINTMENT (OUTPATIENT)
Dept: RADIATION ONCOLOGY | Facility: HOSPITAL | Age: 64
End: 2019-11-01
Attending: RADIOLOGY
Payer: COMMERCIAL

## 2019-11-01 NOTE — PROCEDURES
[facility]  Procedure Note    Danny Chavez Patient Status:  Hospital Outpatient Surgery    1955 MRN [id number]   Location [facility] PRE OP RECOVERY Attending No att. providers found   Hosp Day # 0 PCP Milena Barbosa,

## 2019-11-02 RX ORDER — ERGOCALCIFEROL 1.25 MG/1
CAPSULE ORAL
Qty: 6 CAPSULE | Refills: 1 | Status: SHIPPED | OUTPATIENT
Start: 2019-11-02 | End: 2020-02-05

## 2019-11-07 ENCOUNTER — OFFICE VISIT (OUTPATIENT)
Dept: RADIATION ONCOLOGY | Facility: HOSPITAL | Age: 64
End: 2019-11-07
Attending: RADIOLOGY
Payer: COMMERCIAL

## 2019-11-07 VITALS
RESPIRATION RATE: 16 BRPM | HEART RATE: 64 BPM | WEIGHT: 142.38 LBS | HEIGHT: 62 IN | DIASTOLIC BLOOD PRESSURE: 71 MMHG | SYSTOLIC BLOOD PRESSURE: 136 MMHG | TEMPERATURE: 98 F | BODY MASS INDEX: 26.2 KG/M2

## 2019-11-07 PROCEDURE — 99212 OFFICE O/P EST SF 10 MIN: CPT

## 2019-11-07 NOTE — PROGRESS NOTES
Nursing Consultation Note  Patient: Oren Oden  YOB: 1955  Age: 59year old  Radiation Oncologist: Dr. Nel Pimentel  Referring Physician: Marilou Pugh  Diagnosis:No diagnosis found.   Consult Date: 11/7/2019      Chemotherapy: No hydrochlorothiazide 25 MG Oral Tab TAKE 1 TABLET BY MOUTH EVERY DAY AS NEEDED 90 tablet 2   • Coenzyme Q10 100 MG Oral Cap Take 100 mg by mouth daily. • Vitamin C 500 MG Oral Tab Take 500 mg by mouth daily.      • Calcium Carb-Cholecalciferol (CALCIUM 1 History      Marital status:       Spouse name: Not on file      Number of children: Not on file      Years of education: Not on file      Highest education level: Not on file    Occupational History      Occupation: retired HD RN    Social Needs Education:  y    Are ADL's met? Yes  Does patient feel safe in their environment? Yes  Care decisions:  Patient and/or surrogate IS involved in care decisions. Advanced directives:  Patient DOES NOT have advanced directives.   Transportation:  [facility] management  Comfortable with knowledge level    Progress Toward Outcome:  Making progress    Pamphlets/Handouts Given to Patient:  Understanding radiation therapy  Site specific side effects Breast  ACS information  Patient tights responsibilities     Fati

## 2019-11-07 NOTE — CONSULTS
RADIATION ONCOLOGY NOTE    DATE OF VISIT: 11/7/2019    DIAGNOSIS : Stage IA pT1c, pN0(sn), cM0, G3, ER+, TX+, HER2-, Oncotype DX score: 18, s/p lumpectomy and SLNB, for adjuvant radiation and hormonal blockade.      Dear Tad Blanco, Gabriel Chambers, and colleagues, 10/8/2019  CONCLUSION:  Successful lymphoscintigraphy procedure.      Dictated by (CST): Fracisco Spencer MD on 10/08/2019 at 12:57     Approved by (CST): Fracisco Spencer MD on 10/08/2019 at 12:58          Mary Post Procedure Image Right (cpt=[id number])    Result Negative. Skin: Positive for rash. Bilateral dorsum hands. Allergic/Immunologic: Positive for environmental allergies. Neurological: Negative. Hematological: Negative. Psychiatric/Behavioral: Negative.           Current Outpatient Medic ([facility]     PAST SURGICAL HISTORY:   has a past surgical history that includes other surgical history (Injection Tendon Sheath, Ligament); other surgical history (Left, 2012) (Bone spur removal); electrocardiogram, complete (12/11/2005) (Scanned to media tab Neuro exam is nonfocal.    COMPLETED TESTS:  I have reviewed the patient's clinical, radiographic, pathologic and laboratory studies.     ASSESSMENT/PLAN    58 yo with Stage IA pT1c, pN0(sn), cM0, G3, ER+, DE+, HER2-, Oncotype DX score: 18, s/p lumpectomy Deep margin                                                                      E) - Breast, right, Medial margin                                                                    F) - Breast, right, Lateral margin surgical margin, negative for malignancy. H. Inferior margin, right breast; excision:  · Focal atypical ductal hyperplasia (1 mm). · All sections, including inked surgical margin, negative for malignancy.      Comment:  Keratin AE1/AE3 immunohistochem from Closest Margin (Millimeters)  6 mm   Closest Margin  Inferior    Distance from Other Margins     Anterior Margin (Millimeters)  7.5 mm   Posterior Margin (Millimeters)  7.2 mm   Superior Margin (Millimeters)  12 mm   Inferior Margin (Millimeters)  6 m double clip - lateral margin. The specimen measures 4.5 cm from the medial to lateral, 2.4 cm from superior to inferior, 3.0 cm from superior to inferior.  The margins are inked as follows: superior - blue, anterior - orange, inferior - green, medial - red, specimen consists of a 1 gram piece of yellow-tan fatty tissue measuring 2.5 x 1.5 x 0.7 cm. A clip marks the true margin. The true margin is inked black. The specimen is serially sectioned and submitted entirely in cassettes D1-D2.       The specimen is co 10/09/19. Further processing at 9:30 p.m., on 10/09/19. Specimen H is labeled \"[place], right breast inferior margin, clip marks true margin\" received in formalin.  The specimen consists of a 1 gram piece of yellow-tan fatty tissue measuring 1.5 x lump in unspecified breast  The patient presents for evaluation of a palpable abnormality in the right breast for 2 weeks. The patient has a family history of breast cancer, diagnosed in two sisters at ages 28 and 68.       TECHNIQUE:    Digital diagnostic 2.3 cm lymph node with cortical thickening up to 0.3 cm in the right axilla is at moderate suspicion for malignancy. Recommend ultrasound guided biopsy. This was discussed with the patient.      No mammographic evidence of malignancy in the left breast.

## 2019-11-12 ENCOUNTER — APPOINTMENT (OUTPATIENT)
Dept: RADIATION ONCOLOGY | Facility: HOSPITAL | Age: 64
End: 2019-11-12
Attending: RADIOLOGY
Payer: COMMERCIAL

## 2019-11-14 ENCOUNTER — TELEPHONE (OUTPATIENT)
Dept: HEMATOLOGY/ONCOLOGY | Facility: HOSPITAL | Age: 64
End: 2019-11-14

## 2019-11-14 NOTE — TELEPHONE ENCOUNTER
Returned phone call. Pt called to clarify dosage of Vitamin D and oscal. Per pt taking as ordered by Dr. Kaylen Batista. Pt informed that Dr. Aleshia Rodas ordered a new prescription for Ergocalciferol (vitamin D) on 11/2/19. Pt was not aware Dr. Aleshia Rodas ordered new prescription.  Pt

## 2019-11-15 DIAGNOSIS — E55.9 VITAMIN D DEFICIENCY: Primary | ICD-10-CM

## 2019-11-18 ENCOUNTER — OFFICE VISIT (OUTPATIENT)
Dept: DERMATOLOGY CLINIC | Facility: CLINIC | Age: 64
End: 2019-11-18

## 2019-11-18 ENCOUNTER — APPOINTMENT (OUTPATIENT)
Dept: RADIATION ONCOLOGY | Facility: HOSPITAL | Age: 64
End: 2019-11-18
Attending: RADIOLOGY
Payer: COMMERCIAL

## 2019-11-18 DIAGNOSIS — L30.9 HAND ECZEMA: Primary | ICD-10-CM

## 2019-11-18 PROCEDURE — 77290 THER RAD SIMULAJ FIELD CPLX: CPT | Performed by: RADIOLOGY

## 2019-11-18 PROCEDURE — 99202 OFFICE O/P NEW SF 15 MIN: CPT | Performed by: DERMATOLOGY

## 2019-11-18 PROCEDURE — 77333 RADIATION TREATMENT AID(S): CPT | Performed by: RADIOLOGY

## 2019-11-18 RX ORDER — CLOBETASOL PROPIONATE 0.5 MG/G
OINTMENT TOPICAL
Qty: 45 G | Refills: 2 | Status: SHIPPED | OUTPATIENT
Start: 2019-11-18

## 2019-11-18 NOTE — PATIENT INSTRUCTIONS
Please follow-up in 6 to 8 weeks to reassess hands. Remember to wear gloves whenever possible. Use the CeraVe moisturizer as often as she like.   The clobetasol once or twice a day at most.

## 2019-11-18 NOTE — PROGRESS NOTES
HPI:     Chief Complaint     Derm Problem        HPI     Derm Problem      Additional comments: New Patient with red itching rash on bilateral hands . Patient c/o having rash for 6 months . Patient currently using aquaphor and aveeno with no relief (150 mg total) by mouth every 30 (thirty) days. 3 tablet 3   • hydrochlorothiazide 25 MG Oral Tab TAKE 1 TABLET BY MOUTH EVERY DAY AS NEEDED 90 tablet 2   • Coenzyme Q10 100 MG Oral Cap Take 100 mg by mouth daily.      • Vitamin C 500 MG Oral Tab Take 500 m level: Not on file    Occupational History      Occupation: retired HD RN    Social Needs      Financial resource strain: Not on file      Food insecurity:        Worry: Not on file        Inability: Not on file      Transportation needs:        Medical: N • Stroke Mother    • Breast Cancer Sister 68   • Prostate Cancer Brother 62       PHYSICAL EXAM:   Patient is alert and oriented and appears their stated age. They are well-nourished. Exam is remarkable for the following-  1.   There is some erythema an

## 2019-11-21 PROCEDURE — 77295 3-D RADIOTHERAPY PLAN: CPT | Performed by: RADIOLOGY

## 2019-11-21 PROCEDURE — 77334 RADIATION TREATMENT AID(S): CPT | Performed by: RADIOLOGY

## 2019-11-21 PROCEDURE — 77300 RADIATION THERAPY DOSE PLAN: CPT | Performed by: RADIOLOGY

## 2019-12-01 ENCOUNTER — APPOINTMENT (OUTPATIENT)
Dept: RADIATION ONCOLOGY | Facility: HOSPITAL | Age: 64
End: 2019-12-01
Attending: RADIOLOGY
Payer: COMMERCIAL

## 2019-12-05 ENCOUNTER — APPOINTMENT (OUTPATIENT)
Dept: RADIATION ONCOLOGY | Facility: HOSPITAL | Age: 64
End: 2019-12-05
Attending: RADIOLOGY
Payer: COMMERCIAL

## 2019-12-05 PROCEDURE — 77280 THER RAD SIMULAJ FIELD SMPL: CPT | Performed by: RADIOLOGY

## 2019-12-05 PROCEDURE — 77412 RADIATION TX DELIVERY LVL 3: CPT | Performed by: RADIOLOGY

## 2019-12-06 PROCEDURE — 77290 THER RAD SIMULAJ FIELD CPLX: CPT | Performed by: RADIOLOGY

## 2019-12-06 PROCEDURE — 77412 RADIATION TX DELIVERY LVL 3: CPT | Performed by: RADIOLOGY

## 2019-12-09 ENCOUNTER — OFFICE VISIT (OUTPATIENT)
Dept: RADIATION ONCOLOGY | Facility: HOSPITAL | Age: 64
End: 2019-12-09
Attending: RADIOLOGY
Payer: COMMERCIAL

## 2019-12-09 VITALS
TEMPERATURE: 98 F | OXYGEN SATURATION: 97 % | RESPIRATION RATE: 16 BRPM | HEART RATE: 68 BPM | SYSTOLIC BLOOD PRESSURE: 136 MMHG | DIASTOLIC BLOOD PRESSURE: 71 MMHG

## 2019-12-09 PROCEDURE — 77412 RADIATION TX DELIVERY LVL 3: CPT | Performed by: RADIOLOGY

## 2019-12-09 PROCEDURE — 77387 GUIDANCE FOR RADJ TX DLVR: CPT | Performed by: RADIOLOGY

## 2019-12-09 NOTE — PROGRESS NOTES
[facility] Radiation Treatment Management Note 1-5    Patient:  Ras Leyva  Age:  59year old  Visit Diagnosis:  No diagnosis found.   Primary Rad/Onc:  Dr. Rody Fitzpatrick Ellison    Site Delivered Dose (Gy) Prescribed Dose (Gy) Fraction

## 2019-12-10 PROCEDURE — 77412 RADIATION TX DELIVERY LVL 3: CPT | Performed by: RADIOLOGY

## 2019-12-10 PROCEDURE — 77387 GUIDANCE FOR RADJ TX DLVR: CPT | Performed by: RADIOLOGY

## 2019-12-11 PROCEDURE — 77412 RADIATION TX DELIVERY LVL 3: CPT | Performed by: RADIOLOGY

## 2019-12-11 PROCEDURE — 77387 GUIDANCE FOR RADJ TX DLVR: CPT | Performed by: RADIOLOGY

## 2019-12-12 PROCEDURE — 77412 RADIATION TX DELIVERY LVL 3: CPT | Performed by: RADIOLOGY

## 2019-12-12 PROCEDURE — 77387 GUIDANCE FOR RADJ TX DLVR: CPT | Performed by: RADIOLOGY

## 2019-12-13 PROCEDURE — 77387 GUIDANCE FOR RADJ TX DLVR: CPT | Performed by: RADIOLOGY

## 2019-12-13 PROCEDURE — 77336 RADIATION PHYSICS CONSULT: CPT | Performed by: RADIOLOGY

## 2019-12-13 PROCEDURE — 77412 RADIATION TX DELIVERY LVL 3: CPT | Performed by: RADIOLOGY

## 2019-12-16 ENCOUNTER — OFFICE VISIT (OUTPATIENT)
Dept: RADIATION ONCOLOGY | Facility: HOSPITAL | Age: 64
End: 2019-12-16
Attending: RADIOLOGY
Payer: COMMERCIAL

## 2019-12-16 VITALS — DIASTOLIC BLOOD PRESSURE: 81 MMHG | HEART RATE: 74 BPM | SYSTOLIC BLOOD PRESSURE: 154 MMHG | RESPIRATION RATE: 16 BRPM

## 2019-12-16 DIAGNOSIS — C50.411 MALIGNANT NEOPLASM OF UPPER-OUTER QUADRANT OF RIGHT BREAST IN FEMALE, ESTROGEN RECEPTOR POSITIVE (HCC): Primary | ICD-10-CM

## 2019-12-16 DIAGNOSIS — Z17.0 MALIGNANT NEOPLASM OF UPPER-OUTER QUADRANT OF RIGHT BREAST IN FEMALE, ESTROGEN RECEPTOR POSITIVE (HCC): Primary | ICD-10-CM

## 2019-12-16 PROCEDURE — 77387 GUIDANCE FOR RADJ TX DLVR: CPT | Performed by: RADIOLOGY

## 2019-12-16 PROCEDURE — 77412 RADIATION TX DELIVERY LVL 3: CPT | Performed by: RADIOLOGY

## 2019-12-16 NOTE — PROGRESS NOTES
[facility] Radiation Treatment Management Note 6-10    Patient:  Negar Cabrales  Age:  59year old  Visit Diagnosis:    1.  Malignant neoplasm of upper-outer quadrant of right breast in female, estrogen receptor positive ([facility]

## 2019-12-17 PROCEDURE — 77412 RADIATION TX DELIVERY LVL 3: CPT | Performed by: RADIOLOGY

## 2019-12-17 PROCEDURE — 77387 GUIDANCE FOR RADJ TX DLVR: CPT | Performed by: RADIOLOGY

## 2019-12-18 PROCEDURE — 77412 RADIATION TX DELIVERY LVL 3: CPT | Performed by: RADIOLOGY

## 2019-12-18 PROCEDURE — 77387 GUIDANCE FOR RADJ TX DLVR: CPT | Performed by: RADIOLOGY

## 2019-12-19 PROCEDURE — 77387 GUIDANCE FOR RADJ TX DLVR: CPT | Performed by: RADIOLOGY

## 2019-12-19 PROCEDURE — 77412 RADIATION TX DELIVERY LVL 3: CPT | Performed by: RADIOLOGY

## 2019-12-20 ENCOUNTER — APPOINTMENT (OUTPATIENT)
Dept: RADIATION ONCOLOGY | Facility: HOSPITAL | Age: 64
End: 2019-12-20
Attending: RADIOLOGY
Payer: COMMERCIAL

## 2019-12-20 PROCEDURE — 77334 RADIATION TREATMENT AID(S): CPT | Performed by: RADIOLOGY

## 2019-12-20 PROCEDURE — 77290 THER RAD SIMULAJ FIELD CPLX: CPT | Performed by: RADIOLOGY

## 2019-12-20 PROCEDURE — 77336 RADIATION PHYSICS CONSULT: CPT | Performed by: RADIOLOGY

## 2019-12-20 PROCEDURE — 77412 RADIATION TX DELIVERY LVL 3: CPT | Performed by: RADIOLOGY

## 2019-12-23 ENCOUNTER — OFFICE VISIT (OUTPATIENT)
Dept: RADIATION ONCOLOGY | Facility: HOSPITAL | Age: 64
End: 2019-12-23
Attending: INTERNAL MEDICINE
Payer: COMMERCIAL

## 2019-12-23 VITALS
RESPIRATION RATE: 16 BRPM | TEMPERATURE: 98 F | DIASTOLIC BLOOD PRESSURE: 74 MMHG | SYSTOLIC BLOOD PRESSURE: 138 MMHG | HEART RATE: 69 BPM

## 2019-12-23 DIAGNOSIS — C50.411 MALIGNANT NEOPLASM OF UPPER-OUTER QUADRANT OF RIGHT BREAST IN FEMALE, ESTROGEN RECEPTOR POSITIVE (HCC): Primary | ICD-10-CM

## 2019-12-23 DIAGNOSIS — Z17.0 MALIGNANT NEOPLASM OF UPPER-OUTER QUADRANT OF RIGHT BREAST IN FEMALE, ESTROGEN RECEPTOR POSITIVE (HCC): Primary | ICD-10-CM

## 2019-12-23 PROCEDURE — 77412 RADIATION TX DELIVERY LVL 3: CPT | Performed by: RADIOLOGY

## 2019-12-23 PROCEDURE — 77387 GUIDANCE FOR RADJ TX DLVR: CPT | Performed by: RADIOLOGY

## 2019-12-23 NOTE — PROGRESS NOTES
[facility] Radiation Treatment Management Note 11-15    Patient:  Phu Soto  Age:  59year old  Visit Diagnosis:    1.  Malignant neoplasm of upper-outer quadrant of right breast in female, estrogen receptor positive ([facility] [address]

## 2019-12-24 PROCEDURE — 77412 RADIATION TX DELIVERY LVL 3: CPT | Performed by: RADIOLOGY

## 2019-12-24 PROCEDURE — 77295 3-D RADIOTHERAPY PLAN: CPT | Performed by: RADIOLOGY

## 2019-12-24 PROCEDURE — 77300 RADIATION THERAPY DOSE PLAN: CPT | Performed by: RADIOLOGY

## 2019-12-24 PROCEDURE — 77334 RADIATION TREATMENT AID(S): CPT | Performed by: RADIOLOGY

## 2019-12-24 NOTE — ANESTHESIA POSTPROCEDURE EVALUATION
Patient: Andre Tony    Procedure Summary     Date:  10/08/19 Room / Location:  [facility] OR  / [facility] OR    Anesthesia Start:  7282 Anesthesia Stop:  4543    Procedures:       BREAST LUMPECTOMY (Right )      BREAST SENTINEL LYMPH NODE BIOPSY
bilateral rails

## 2019-12-26 PROCEDURE — 77412 RADIATION TX DELIVERY LVL 3: CPT | Performed by: RADIOLOGY

## 2019-12-26 PROCEDURE — 77387 GUIDANCE FOR RADJ TX DLVR: CPT | Performed by: RADIOLOGY

## 2019-12-27 PROCEDURE — 77387 GUIDANCE FOR RADJ TX DLVR: CPT | Performed by: RADIOLOGY

## 2019-12-27 PROCEDURE — 77412 RADIATION TX DELIVERY LVL 3: CPT | Performed by: RADIOLOGY

## 2019-12-27 PROCEDURE — 77336 RADIATION PHYSICS CONSULT: CPT | Performed by: RADIOLOGY

## 2019-12-30 ENCOUNTER — OFFICE VISIT (OUTPATIENT)
Dept: RADIATION ONCOLOGY | Facility: HOSPITAL | Age: 64
End: 2019-12-30
Attending: RADIOLOGY
Payer: COMMERCIAL

## 2019-12-30 VITALS — HEART RATE: 68 BPM | DIASTOLIC BLOOD PRESSURE: 81 MMHG | SYSTOLIC BLOOD PRESSURE: 131 MMHG | RESPIRATION RATE: 16 BRPM

## 2019-12-30 DIAGNOSIS — C50.411 MALIGNANT NEOPLASM OF UPPER-OUTER QUADRANT OF RIGHT BREAST IN FEMALE, ESTROGEN RECEPTOR POSITIVE (HCC): Primary | ICD-10-CM

## 2019-12-30 DIAGNOSIS — Z17.0 MALIGNANT NEOPLASM OF UPPER-OUTER QUADRANT OF RIGHT BREAST IN FEMALE, ESTROGEN RECEPTOR POSITIVE (HCC): Primary | ICD-10-CM

## 2019-12-30 NOTE — PROGRESS NOTES
[facility] Radiation Treatment Management Note 16-20    Patient:  Frieda Puentes  Age:  59year old  Visit Diagnosis:    1.  Malignant neoplasm of upper-outer quadrant of right breast in female, estrogen receptor positive ([facility] [address]

## 2019-12-30 NOTE — PATIENT INSTRUCTIONS
Continue to moisturize for the next 2-3 weeks following radiation. Use SPF 30 or greater when exposed to the sun as your skin will remain sun sensitive for months, even years.     Follow up with Dr Rebeca Solomon 3/19/20 @ 1000 am. Please call [phone] for any

## 2019-12-31 ENCOUNTER — APPOINTMENT (OUTPATIENT)
Dept: RADIATION ONCOLOGY | Facility: HOSPITAL | Age: 64
End: 2019-12-31
Attending: RADIOLOGY
Payer: COMMERCIAL

## 2020-01-01 ENCOUNTER — APPOINTMENT (OUTPATIENT)
Dept: RADIATION ONCOLOGY | Facility: HOSPITAL | Age: 65
End: 2020-01-01
Attending: RADIOLOGY
Payer: COMMERCIAL

## 2020-01-02 PROCEDURE — 77412 RADIATION TX DELIVERY LVL 3: CPT | Performed by: RADIOLOGY

## 2020-01-02 PROCEDURE — 77280 THER RAD SIMULAJ FIELD SMPL: CPT | Performed by: RADIOLOGY

## 2020-01-03 PROCEDURE — 77412 RADIATION TX DELIVERY LVL 3: CPT | Performed by: RADIOLOGY

## 2020-01-03 PROCEDURE — 77387 GUIDANCE FOR RADJ TX DLVR: CPT | Performed by: RADIOLOGY

## 2020-01-06 ENCOUNTER — OFFICE VISIT (OUTPATIENT)
Dept: RADIATION ONCOLOGY | Facility: HOSPITAL | Age: 65
End: 2020-01-06
Attending: RADIOLOGY
Payer: COMMERCIAL

## 2020-01-06 VITALS — HEART RATE: 64 BPM | DIASTOLIC BLOOD PRESSURE: 86 MMHG | SYSTOLIC BLOOD PRESSURE: 141 MMHG | RESPIRATION RATE: 16 BRPM

## 2020-01-06 DIAGNOSIS — Z17.0 MALIGNANT NEOPLASM OF UPPER-OUTER QUADRANT OF RIGHT BREAST IN FEMALE, ESTROGEN RECEPTOR POSITIVE (HCC): Primary | ICD-10-CM

## 2020-01-06 DIAGNOSIS — C50.411 MALIGNANT NEOPLASM OF UPPER-OUTER QUADRANT OF RIGHT BREAST IN FEMALE, ESTROGEN RECEPTOR POSITIVE (HCC): Primary | ICD-10-CM

## 2020-01-06 PROCEDURE — 77387 GUIDANCE FOR RADJ TX DLVR: CPT | Performed by: RADIOLOGY

## 2020-01-06 PROCEDURE — 77412 RADIATION TX DELIVERY LVL 3: CPT | Performed by: RADIOLOGY

## 2020-01-06 NOTE — PATIENT INSTRUCTIONS
Continue to moisturize for the next 2-3 weeks following radiation.      Use SPF 30 or greater when exposed to the sun as your skin will remain sun sensitive for months, even years.     Follow up with Dr Dakota Browne 3/19/20 @ 1000 am. Please call [phone] for an

## 2020-01-06 NOTE — PROGRESS NOTES
[facility] Radiation Treatment Management Note 16-20    Patient:  Asif Olea  Age:  59year old  Visit Diagnosis:    1.  Malignant neoplasm of upper-outer quadrant of right breast in female, estrogen receptor positive ([facility]

## 2020-01-07 PROCEDURE — 77387 GUIDANCE FOR RADJ TX DLVR: CPT | Performed by: RADIOLOGY

## 2020-01-07 PROCEDURE — 77412 RADIATION TX DELIVERY LVL 3: CPT | Performed by: RADIOLOGY

## 2020-01-08 ENCOUNTER — OFFICE VISIT (OUTPATIENT)
Dept: HEMATOLOGY/ONCOLOGY | Facility: HOSPITAL | Age: 65
End: 2020-01-08
Attending: INTERNAL MEDICINE
Payer: COMMERCIAL

## 2020-01-08 ENCOUNTER — HOSPITAL ENCOUNTER (OUTPATIENT)
Dept: CV DIAGNOSTICS | Facility: HOSPITAL | Age: 65
Discharge: HOME | End: 2020-01-08
Attending: INTERNAL MEDICINE
Payer: COMMERCIAL

## 2020-01-08 VITALS
OXYGEN SATURATION: 100 % | TEMPERATURE: 98 F | RESPIRATION RATE: 16 BRPM | BODY MASS INDEX: 25.76 KG/M2 | WEIGHT: 140 LBS | DIASTOLIC BLOOD PRESSURE: 80 MMHG | HEIGHT: 62 IN | SYSTOLIC BLOOD PRESSURE: 131 MMHG | HEART RATE: 72 BPM

## 2020-01-08 DIAGNOSIS — R94.31 ABNORMAL EKG: ICD-10-CM

## 2020-01-08 DIAGNOSIS — Z17.0 MALIGNANT NEOPLASM OF UPPER-OUTER QUADRANT OF RIGHT BREAST IN FEMALE, ESTROGEN RECEPTOR POSITIVE (HCC): Primary | ICD-10-CM

## 2020-01-08 DIAGNOSIS — C50.411 MALIGNANT NEOPLASM OF UPPER-OUTER QUADRANT OF RIGHT BREAST IN FEMALE, ESTROGEN RECEPTOR POSITIVE (HCC): Primary | ICD-10-CM

## 2020-01-08 PROCEDURE — 99214 OFFICE O/P EST MOD 30 MIN: CPT | Performed by: INTERNAL MEDICINE

## 2020-01-08 PROCEDURE — 93306 TTE W/DOPPLER COMPLETE: CPT | Performed by: INTERNAL MEDICINE

## 2020-01-08 RX ORDER — LETROZOLE 2.5 MG/1
2.5 TABLET, FILM COATED ORAL DAILY
Qty: 30 TABLET | Refills: 6 | Status: SHIPPED | OUTPATIENT
Start: 2020-01-08 | End: 2020-07-08

## 2020-01-08 NOTE — PROGRESS NOTES
BERTRAM     Taylor Gaffney is a 59year old female who is here today for follow-up of diagnosis of Malignant neoplasm of upper-outer quadrant of right breast in female, estrogen receptor positive (hcc)  (primary encounter diagnosis)     Patient here days. 3 tablet 3   • hydrochlorothiazide 25 MG Oral Tab TAKE 1 TABLET BY MOUTH EVERY DAY AS NEEDED 90 tablet 2   • Coenzyme Q10 100 MG Oral Cap Take 100 mg by mouth daily. • Vitamin C 500 MG Oral Tab Take 500 mg by mouth daily.      • Calcium Carb-Peg contralateral   • Other (gastro-intestinal cancer) Father 80        d.84; non-smoker   • Other (gastric cancer) Brother 61        fmr smoker   • Cancer Paternal Aunt         cervical ca   • Stroke Mother    • Breast Cancer Sister 68   • Prostate Cancer Bro 59year old female with ER/NM positive breast cancer, node negative. Patient status post lumpectomy, and status post radiation therapy. Oncotype score low risk or recurrence, with Oncotype DX score of 18.    No additional benefit from adjuvant chemoth

## 2020-01-08 NOTE — PROGRESS NOTES
RADIATION ONCOLOGY COMPLETION SUMMARY NOTE    DIAGNOSIS : Stage IA pT1c, pN0(sn), cM0, G3, ER+, NJ+, HER2-, Oncotype DX score: 18, s/p lumpectomy and SLNB, s/p adjuvant radiation on 1/7/2020, for long term hormonal blockade.      Dear Tad Malcolm, [address] Sh[address] a Treatment Technique:  3D Conformal  Prone Tangents    Overall, pt tolerated treatment well and the dose was delivered as planned. Pt had the expected side effects of grade 1 dermatitis and used OTC meds for skin sensitivity.      Pt was also giv

## 2020-01-08 NOTE — PATIENT INSTRUCTIONS
Letrozole tablets  Brand Name: Asia Dilcia  What is this medicine? LETROZOLE (LET thierry zole) blocks the production of estrogen. It is used to treat breast cancer. How should I use this medicine? Take this medicine by mouth with a glass of water.  You may jevon Store between 15 and 30 degrees C (59 and 86 degrees F). Throw away any unused medicine after the expiration date. What should I tell my health care provider before I take this medicine?   They need to know if you have any of these conditions:  · high chol

## 2020-01-10 PROCEDURE — 77336 RADIATION PHYSICS CONSULT: CPT | Performed by: RADIOLOGY

## 2020-01-19 ENCOUNTER — APPOINTMENT (OUTPATIENT)
Dept: ULTRASOUND IMAGING | Facility: HOSPITAL | Age: 65
End: 2020-01-19
Attending: EMERGENCY MEDICINE
Payer: COMMERCIAL

## 2020-01-19 ENCOUNTER — HOSPITAL ENCOUNTER (EMERGENCY)
Facility: HOSPITAL | Age: 65
Discharge: HOME | End: 2020-01-19
Attending: EMERGENCY MEDICINE
Payer: COMMERCIAL

## 2020-01-19 ENCOUNTER — APPOINTMENT (OUTPATIENT)
Dept: GENERAL RADIOLOGY | Facility: HOSPITAL | Age: 65
End: 2020-01-19
Attending: EMERGENCY MEDICINE
Payer: COMMERCIAL

## 2020-01-19 VITALS
HEIGHT: 62 IN | WEIGHT: 133 LBS | BODY MASS INDEX: 24.48 KG/M2 | TEMPERATURE: 98 F | DIASTOLIC BLOOD PRESSURE: 79 MMHG | OXYGEN SATURATION: 100 % | SYSTOLIC BLOOD PRESSURE: 150 MMHG | RESPIRATION RATE: 16 BRPM | HEART RATE: 69 BPM

## 2020-01-19 DIAGNOSIS — M25.561 ARTHRALGIA OF RIGHT LOWER LEG: Primary | ICD-10-CM

## 2020-01-19 PROCEDURE — 99284 EMERGENCY DEPT VISIT MOD MDM: CPT

## 2020-01-19 PROCEDURE — 73590 X-RAY EXAM OF LOWER LEG: CPT | Performed by: EMERGENCY MEDICINE

## 2020-01-19 PROCEDURE — 93971 EXTREMITY STUDY: CPT | Performed by: EMERGENCY MEDICINE

## 2020-01-19 RX ORDER — METHYLPREDNISOLONE 4 MG/1
TABLET ORAL
Qty: 1 PACKAGE | Refills: 0 | Status: SHIPPED | OUTPATIENT
Start: 2020-01-19 | End: 2020-02-05 | Stop reason: ALTCHOICE

## 2020-01-19 RX ORDER — CYCLOBENZAPRINE HCL 10 MG
5 TABLET ORAL 2 TIMES DAILY PRN
Qty: 5 TABLET | Refills: 0 | Status: SHIPPED | OUTPATIENT
Start: 2020-01-19 | End: 2020-02-04 | Stop reason: ALTCHOICE

## 2020-01-19 NOTE — ED NOTES
Pt reports right leg pain, and states she's concerned it may be a clot. No swelling noted to lower extremity. Right leg is warm, dry, no redness. Pedal pulse palpable +2. Left pedal pulse +2.

## 2020-01-19 NOTE — ED PROVIDER NOTES
Patient Seen in: [facility] AND [facility] Emergency Department      History   Patient presents with:  Musculoskeletal Problem: right lower leg pain.      Stated Complaint: right leg pain     HPI    43-year-old female who is relatively healthy here with her husba Pulse 74   Resp 16   Temp 98.3 °F (36.8 °C)   Temp src Oral   SpO2 98 %   O2 Device None (Room air)       Current:BP (!) 161/79   Pulse 62   Temp 98.3 °F (36.8 °C) (Oral)   Resp 16   Ht 157.5 cm (5' 2\")   Wt 60.3 kg   LMP 10/03/1995 (Approximate)   SpO2 secondary to stress changes. If there is continued pain, nonemergent MRI of the tibia and fibula can be performed to exclude underlying mass lesion. No acute fracture or dislocation.      Dictated by (CST): Leticia Guzman MD on 1/19/2020 at 13:49     Approv

## 2020-01-23 DIAGNOSIS — M79.661 PAIN OF RIGHT LOWER LEG: Primary | ICD-10-CM

## 2020-02-03 ENCOUNTER — TELEPHONE (OUTPATIENT)
Dept: INTERNAL MEDICINE CLINIC | Facility: CLINIC | Age: 65
End: 2020-02-03

## 2020-02-04 ENCOUNTER — OFFICE VISIT (OUTPATIENT)
Dept: HEMATOLOGY/ONCOLOGY | Facility: HOSPITAL | Age: 65
End: 2020-02-04
Attending: NURSE PRACTITIONER
Payer: COMMERCIAL

## 2020-02-04 DIAGNOSIS — Z85.3 PERSONAL HISTORY OF BREAST CANCER: Primary | ICD-10-CM

## 2020-02-04 DIAGNOSIS — Z08 ENCOUNTER FOR FOLLOW-UP EXAMINATION AFTER COMPLETED TREATMENT FOR MALIGNANT NEOPLASM: ICD-10-CM

## 2020-02-04 DIAGNOSIS — Z71.9 COUNSELING, UNSPECIFIED: ICD-10-CM

## 2020-02-04 PROCEDURE — 99215 OFFICE O/P EST HI 40 MIN: CPT | Performed by: NURSE PRACTITIONER

## 2020-02-04 NOTE — PROGRESS NOTES
I met with Ms. Mindy Elise for a Survivorship Clinic visit to provide a survivorship care plan (SCP) and information related to post-treatment care. Her , Francesca Denver, accompanied her to the visit.   She has a diagnosis of right breast cancer (ER, NE positiv her PCP,  Co.     Reviewed Cancer Surveillance (office visits, imaging, other) and that Dr. Kay Shankar will oversee this care. She is scheduled for a right mammogram in May.   I encouraged her to schedule a follow-up with Dr. Jen Penny and she wi handout-nutrition, physical activity  -ACS Cancer Screening Guidelines  -Websites: American Cancer Society, Living Beyond Breast Cancer; [place] Sister: Facebook; Cooking [employer];  About Herbs and Botanicals [facility])     The patient was given the opportunity t

## 2020-02-05 RX ORDER — DIMENHYDRINATE 50 MG
TABLET ORAL DAILY
COMMUNITY

## 2020-02-17 ENCOUNTER — APPOINTMENT (OUTPATIENT)
Dept: LAB | Facility: REFERENCE LAB | Age: 65
End: 2020-02-17
Attending: INTERNAL MEDICINE
Payer: COMMERCIAL

## 2020-02-17 DIAGNOSIS — E55.9 VITAMIN D DEFICIENCY: ICD-10-CM

## 2020-02-17 PROCEDURE — 36415 COLL VENOUS BLD VENIPUNCTURE: CPT

## 2020-02-17 PROCEDURE — 82306 VITAMIN D 25 HYDROXY: CPT

## 2020-02-19 LAB — 25(OH)D3 SERPL-MCNC: 31.2 NG/ML (ref 30–100)

## 2020-03-19 ENCOUNTER — APPOINTMENT (OUTPATIENT)
Dept: RADIATION ONCOLOGY | Facility: HOSPITAL | Age: 65
End: 2020-03-19
Attending: RADIOLOGY

## 2020-05-01 ENCOUNTER — TELEPHONE (OUTPATIENT)
Dept: SURGERY | Facility: CLINIC | Age: 65
End: 2020-05-01

## 2020-05-01 NOTE — TELEPHONE ENCOUNTER
Pt called to let Dr. Barr Age office know she will be rescheduling her mammogram due to the Covid-19, provided the Central Scheduling number.

## 2020-07-08 ENCOUNTER — HOSPITAL ENCOUNTER (OUTPATIENT)
Dept: MAMMOGRAPHY | Facility: HOSPITAL | Age: 65
Discharge: HOME | End: 2020-07-08
Attending: SURGERY
Payer: COMMERCIAL

## 2020-07-08 ENCOUNTER — TELEPHONE (OUTPATIENT)
Dept: SURGERY | Facility: CLINIC | Age: 65
End: 2020-07-08

## 2020-07-08 ENCOUNTER — OFFICE VISIT (OUTPATIENT)
Dept: HEMATOLOGY/ONCOLOGY | Facility: HOSPITAL | Age: 65
End: 2020-07-08
Attending: NURSE PRACTITIONER
Payer: COMMERCIAL

## 2020-07-08 VITALS
BODY MASS INDEX: 25.21 KG/M2 | SYSTOLIC BLOOD PRESSURE: 149 MMHG | HEIGHT: 62 IN | HEART RATE: 69 BPM | DIASTOLIC BLOOD PRESSURE: 82 MMHG | WEIGHT: 137 LBS | OXYGEN SATURATION: 99 % | RESPIRATION RATE: 16 BRPM

## 2020-07-08 DIAGNOSIS — Z17.0 MALIGNANT NEOPLASM OF UPPER-OUTER QUADRANT OF RIGHT BREAST IN FEMALE, ESTROGEN RECEPTOR POSITIVE (HCC): Primary | ICD-10-CM

## 2020-07-08 DIAGNOSIS — T45.1X5A AROMATASE INHIBITOR-ASSOCIATED ARTHRALGIA: ICD-10-CM

## 2020-07-08 DIAGNOSIS — C50.411 MALIGNANT NEOPLASM OF UPPER-OUTER QUADRANT OF RIGHT BREAST IN FEMALE, ESTROGEN RECEPTOR POSITIVE (HCC): Primary | ICD-10-CM

## 2020-07-08 DIAGNOSIS — Z79.811 ENCOUNTER FOR MONITORING AROMATASE INHIBITOR THERAPY: ICD-10-CM

## 2020-07-08 DIAGNOSIS — C50.411 MALIGNANT NEOPLASM OF UPPER-OUTER QUADRANT OF RIGHT BREAST IN FEMALE, ESTROGEN RECEPTOR POSITIVE (HCC): ICD-10-CM

## 2020-07-08 DIAGNOSIS — Z51.81 ENCOUNTER FOR MONITORING AROMATASE INHIBITOR THERAPY: ICD-10-CM

## 2020-07-08 DIAGNOSIS — Z17.0 MALIGNANT NEOPLASM OF UPPER-OUTER QUADRANT OF RIGHT BREAST IN FEMALE, ESTROGEN RECEPTOR POSITIVE (HCC): ICD-10-CM

## 2020-07-08 DIAGNOSIS — M25.50 AROMATASE INHIBITOR-ASSOCIATED ARTHRALGIA: ICD-10-CM

## 2020-07-08 PROCEDURE — 99214 OFFICE O/P EST MOD 30 MIN: CPT | Performed by: INTERNAL MEDICINE

## 2020-07-08 PROCEDURE — 77066 DX MAMMO INCL CAD BI: CPT | Performed by: SURGERY

## 2020-07-08 PROCEDURE — 77062 BREAST TOMOSYNTHESIS BI: CPT | Performed by: SURGERY

## 2020-07-08 RX ORDER — EXEMESTANE 25 MG/1
25 TABLET ORAL DAILY
Qty: 30 TABLET | Refills: 11 | Status: SHIPPED | OUTPATIENT
Start: 2020-07-08 | End: 2020-07-10

## 2020-07-08 NOTE — PROGRESS NOTES
BERTRAM     Frieda Puentes is a 59year old female who is here today for follow-up of diagnosis of Malignant neoplasm of upper-outer quadrant of right breast in female, estrogen receptor positive (hcc)  (primary encounter diagnosis)  Encounter for mo • NON FORMULARY daily. • letrozole 2.5 MG Oral Tab Take 1 tablet (2.5 mg total) by mouth daily.  30 tablet 6   • Clobetasol Propionate 0.05 % External Ointment Apply to affected areas 1x/day as needed 45 g 2   • atorvastatin 20 MG Oral Tab Take 1 tablet Performed by James Leigh MD at [facility] ENDOSCOPY   • LUMPECTOMY RIGHT Right 10/8/19    Right lumepctomy w SLNB   • OTHER SURGICAL HISTORY      Injection Tendon Sheath, Ligament   • OTHER SURGICAL HISTORY Left 2012    Bone spur removal   • RADIATION Lymphatics: There is no palpable lymphadenopathy throughout in the cervical, supraclavicular, axillary, or inguinal regions.   Psych/Depression: nl      ASSESSMENT/PLAN:   Malignant neoplasm of upper-outer quadrant of right breast in female, estrogen recept COMPARISON: [facility], [facility] VIJAYA 2D+3D DIAGNOSTIC [facility] BILAT ([id number]), 8/30/2019, 10:19 AM.  [facility], [facility] POST [facility] IMAGE RIGHT ([id number]), 9/04/2019, 9:17 AM.  [facility] Dictated by (CST): Esperanza Chavez DO on 7/08/2020 at 2:57 PM       Finalized by (CST): Norah Lim [place] [id number] on 7/08/2020 at 3:06 PM

## 2020-07-10 ENCOUNTER — TELEPHONE (OUTPATIENT)
Dept: HEMATOLOGY/ONCOLOGY | Facility: HOSPITAL | Age: 65
End: 2020-07-10

## 2020-07-10 RX ORDER — LETROZOLE 2.5 MG/1
2.5 TABLET, FILM COATED ORAL DAILY
Qty: 30 TABLET | Refills: 6 | Status: SHIPPED | OUTPATIENT
Start: 2020-07-10 | End: 2020-07-14

## 2020-07-10 NOTE — TELEPHONE ENCOUNTER
aJiro De La Torre called saying she decided to stick with her letrozole, and she would like to speak to Corinne Antoine about this to get a refill for 1 year. She says she did not want to take the medication that was ordered due to the side effects.  Please call

## 2020-07-10 NOTE — TELEPHONE ENCOUNTER
Returned phone call. Per pt states wants to stay on Letrozole and requesting Dr. Sharon Wahl to send refills to pharmacy. Per pt read information on other medications and \" too many side effects. \"  Informed Dr. Sharon Wahl will send Letrozole refill.  Pt verbalizes under

## 2020-07-13 ENCOUNTER — OFFICE VISIT (OUTPATIENT)
Dept: OTOLARYNGOLOGY | Facility: CLINIC | Age: 65
End: 2020-07-13

## 2020-07-13 VITALS
WEIGHT: 132 LBS | TEMPERATURE: 99 F | SYSTOLIC BLOOD PRESSURE: 128 MMHG | BODY MASS INDEX: 24.29 KG/M2 | DIASTOLIC BLOOD PRESSURE: 78 MMHG | HEIGHT: 62 IN

## 2020-07-13 DIAGNOSIS — H69.82 DYSFUNCTION OF LEFT EUSTACHIAN TUBE: Primary | ICD-10-CM

## 2020-07-13 PROCEDURE — 99213 OFFICE O/P EST LOW 20 MIN: CPT | Performed by: OTOLARYNGOLOGY

## 2020-07-13 RX ORDER — FLUTICASONE PROPIONATE 50 MCG
2 SPRAY, SUSPENSION (ML) NASAL DAILY
Qty: 3 BOTTLE | Refills: 4 | Status: SHIPPED | OUTPATIENT
Start: 2020-07-13 | End: 2020-08-12

## 2020-07-13 RX ORDER — PREDNISONE 20 MG/1
TABLET ORAL
Qty: 9 TABLET | Refills: 0 | Status: SHIPPED | OUTPATIENT
Start: 2020-07-13 | End: 2020-10-26 | Stop reason: ALTCHOICE

## 2020-07-13 RX ORDER — RISEDRONATE SODIUM 150 MG/1
150 TABLET, FILM COATED ORAL
Qty: 3 TABLET | Refills: 3 | Status: SHIPPED | OUTPATIENT
Start: 2020-07-13 | End: 2021-07-20

## 2020-07-13 NOTE — PROGRESS NOTES
Corey Son is a 59year old female.  Patient presents with:  Ear Problem: pt presents with bilateral clogged ears, hears a rumbling sound for 2-3 months          HISTORY OF PRESENT ILLNESS  9/25/2019   Here for evaluation of left-sided discomfor Drug use: No      Sexual activity: Not on file    Lifestyle      Physical activity:        Days per week: Not on file        Minutes per session: Not on file      Stress: Not on file    Relationships      Social connections:        Talks on phone: Not on f 10/8/2019    Performed by Pretty Feliciano MD at [facility] OR   • BREAST SENTINEL LYMPH NODE BIOPSY Right 10/8/2019    Performed by Pretty Feliciano MD at [facility] OR   • CHOLECYSTECTOMY      lap cholecystectomy   • COLONOSCOPY N/A 12/4/2017    Performe Neck Exam Normal  normal to inspection and palpation no lymphadenopathy is noted   Psychiatric Normal   Appropriate mood and affect. Eyes Normal Conjunctiva - Right: Normal, Left: Normal. Pupil - Right: Normal, Left: Normal. EOMI.  KAROL   Ears No 10 MG Oral Tab, Take 10 mg by mouth nightly.  , Disp: , Rfl:     ASSESSMENT AND PLAN  1.eustachian tube dysfunction?    Suspect she does have some eustachian tube dysfunction did not want to have a hearing test today so I recommended empiric management with

## 2020-07-14 RX ORDER — LETROZOLE 2.5 MG/1
2.5 TABLET, FILM COATED ORAL DAILY
Qty: 90 TABLET | Refills: 3 | Status: SHIPPED | OUTPATIENT
Start: 2020-07-14 | End: 2021-07-13

## 2020-08-04 ENCOUNTER — OFFICE VISIT (OUTPATIENT)
Dept: INTERNAL MEDICINE CLINIC | Facility: CLINIC | Age: 65
End: 2020-08-04

## 2020-08-04 VITALS
RESPIRATION RATE: 19 BRPM | BODY MASS INDEX: 25.21 KG/M2 | HEIGHT: 62 IN | SYSTOLIC BLOOD PRESSURE: 118 MMHG | DIASTOLIC BLOOD PRESSURE: 78 MMHG | OXYGEN SATURATION: 98 % | HEART RATE: 64 BPM | TEMPERATURE: 98 F | WEIGHT: 137 LBS

## 2020-08-04 DIAGNOSIS — N30.01 ACUTE CYSTITIS WITH HEMATURIA: Primary | ICD-10-CM

## 2020-08-04 LAB
APPEARANCE: CLEAR
BILIRUBIN: NEGATIVE
GLUCOSE (URINE DIPSTICK): NEGATIVE MG/DL
KETONES (URINE DIPSTICK): NEGATIVE MG/DL
MULTISTIX LOT#: 1044 NUMERIC
NITRITE, URINE: NEGATIVE
PH, URINE: 6 (ref 4.5–8)
PROTEIN (URINE DIPSTICK): NEGATIVE MG/DL
SPECIFIC GRAVITY: 1.01 (ref 1–1.03)
URINE-COLOR: YELLOW
UROBILINOGEN,SEMI-QN: 0.2 MG/DL (ref 0–1.9)

## 2020-08-04 PROCEDURE — 99213 OFFICE O/P EST LOW 20 MIN: CPT | Performed by: INTERNAL MEDICINE

## 2020-08-04 PROCEDURE — 3074F SYST BP LT 130 MM HG: CPT | Performed by: INTERNAL MEDICINE

## 2020-08-04 PROCEDURE — 87086 URINE CULTURE/COLONY COUNT: CPT | Performed by: INTERNAL MEDICINE

## 2020-08-04 PROCEDURE — 81002 URINALYSIS NONAUTO W/O SCOPE: CPT | Performed by: INTERNAL MEDICINE

## 2020-08-04 PROCEDURE — 3078F DIAST BP <80 MM HG: CPT | Performed by: INTERNAL MEDICINE

## 2020-08-04 PROCEDURE — 3008F BODY MASS INDEX DOCD: CPT | Performed by: INTERNAL MEDICINE

## 2020-08-04 RX ORDER — LEVOFLOXACIN 500 MG/1
TABLET, FILM COATED ORAL
Qty: 7 TABLET | Refills: 0 | Status: SHIPPED | OUTPATIENT
Start: 2020-08-04 | End: 2020-10-26 | Stop reason: ALTCHOICE

## 2020-08-04 NOTE — PROGRESS NOTES
HPI:    Patient ID: Frieda Puentes is a 59year old female. HPI    CC : Frequent urination     Patient has Rt breast ca, stage 1 A CT1c, cN0 (f), cM0, G3 ER+, Pr+, HER- s/p lumpectomy and status post radiation therapy.     She been doing well unt use: No    Drug use: No    Social History: Occ: retired nurse . : yes .    Exercise: walking, gardening   Diet: watches fats closely, watches sugar closely and watches calories closely       Review of Systems   Constitutional: Negative for activity c MG-UNIT Oral Tab Take  by mouth. • omega-3 fatty acids (FISH OIL) 1000 MG Oral Cap Take  by mouth. • Multiple Vitamin (MULTI-VITAMINS) Oral Tab Take  by mouth. • Garlic 9115 MG Oral Cap Take  by mouth.      • cetirizine (ZYRTEC) 10 MG Oral Tab T - URINE CULTURE, ROUTINE; Future      Orders Placed This Encounter      POC Urinalysis, Manual Dip without microscopy [[id number]]      Urine Culture, Routine [E]      Meds This Visit:  Requested Prescriptions     Signed Prescriptions Disp Refills   • levof

## 2020-08-05 NOTE — PATIENT INSTRUCTIONS
Bladder Infection, Female (Adult)     Urine normally doesn't have any germs (bacteria) in it. But bacteria can get into the urinary tract from the skin around the rectum. Or they can travel in the blood from other parts of the body.  Once they are in your · Wiping incorrectly after urinating. Always wipe from front to back. · Bowel incontinence  · Pregnancy  · Procedures such as having a catheter put in  · Older age  · Not emptying your bladder. This can give bacteria a chance to grow in your urine.   · Flu · Improve your diet and prevent constipation. Eat more fresh fruits and vegetables, and fiber. Eat less junk foods and fatty foods. · Don't have sex until your symptoms are gone. · Don't have caffeine, alcohol, and spicy foods.  These can irritate your bl

## 2020-09-14 NOTE — TELEPHONE ENCOUNTER
Jesu Bailey from cancer center stated she spoke to patient regarding CA Dx.  Pt requesting results to be released in my chart and to be faxed to her at [phone]  Pls advise ,DirectAddress_Unknown

## 2020-10-26 ENCOUNTER — OFFICE VISIT (OUTPATIENT)
Dept: INTERNAL MEDICINE CLINIC | Facility: CLINIC | Age: 65
End: 2020-10-26

## 2020-10-26 VITALS
HEART RATE: 81 BPM | TEMPERATURE: 97 F | WEIGHT: 133.81 LBS | SYSTOLIC BLOOD PRESSURE: 120 MMHG | RESPIRATION RATE: 16 BRPM | OXYGEN SATURATION: 98 % | BODY MASS INDEX: 24.63 KG/M2 | HEIGHT: 62 IN | DIASTOLIC BLOOD PRESSURE: 70 MMHG

## 2020-10-26 DIAGNOSIS — Z00.00 GENERAL MEDICAL EXAM: Primary | ICD-10-CM

## 2020-10-26 DIAGNOSIS — M25.50 AROMATASE INHIBITOR-ASSOCIATED ARTHRALGIA: ICD-10-CM

## 2020-10-26 DIAGNOSIS — Z23 NEED FOR PROPHYLACTIC VACCINATION AGAINST STREPTOCOCCUS PNEUMONIAE (PNEUMOCOCCUS): ICD-10-CM

## 2020-10-26 DIAGNOSIS — T45.1X5A AROMATASE INHIBITOR-ASSOCIATED ARTHRALGIA: ICD-10-CM

## 2020-10-26 DIAGNOSIS — C50.411 MALIGNANT NEOPLASM OF UPPER-OUTER QUADRANT OF RIGHT BREAST IN FEMALE, ESTROGEN RECEPTOR POSITIVE (HCC): ICD-10-CM

## 2020-10-26 DIAGNOSIS — R73.9 HYPERGLYCEMIA: ICD-10-CM

## 2020-10-26 DIAGNOSIS — I10 ESSENTIAL HYPERTENSION: ICD-10-CM

## 2020-10-26 DIAGNOSIS — E03.9 ACQUIRED HYPOTHYROIDISM: ICD-10-CM

## 2020-10-26 DIAGNOSIS — E55.9 VITAMIN D DEFICIENCY: ICD-10-CM

## 2020-10-26 DIAGNOSIS — Z17.0 MALIGNANT NEOPLASM OF UPPER-OUTER QUADRANT OF RIGHT BREAST IN FEMALE, ESTROGEN RECEPTOR POSITIVE (HCC): ICD-10-CM

## 2020-10-26 DIAGNOSIS — E78.5 HYPERLIPIDEMIA, UNSPECIFIED HYPERLIPIDEMIA TYPE: ICD-10-CM

## 2020-10-26 PROCEDURE — 3008F BODY MASS INDEX DOCD: CPT | Performed by: INTERNAL MEDICINE

## 2020-10-26 PROCEDURE — 99397 PER PM REEVAL EST PAT 65+ YR: CPT | Performed by: INTERNAL MEDICINE

## 2020-10-26 PROCEDURE — 90471 IMMUNIZATION ADMIN: CPT | Performed by: INTERNAL MEDICINE

## 2020-10-26 PROCEDURE — 90472 IMMUNIZATION ADMIN EACH ADD: CPT | Performed by: INTERNAL MEDICINE

## 2020-10-26 PROCEDURE — 3074F SYST BP LT 130 MM HG: CPT | Performed by: INTERNAL MEDICINE

## 2020-10-26 PROCEDURE — 90662 IIV NO PRSV INCREASED AG IM: CPT | Performed by: INTERNAL MEDICINE

## 2020-10-26 PROCEDURE — 90732 PPSV23 VACC 2 YRS+ SUBQ/IM: CPT | Performed by: INTERNAL MEDICINE

## 2020-10-26 PROCEDURE — 3078F DIAST BP <80 MM HG: CPT | Performed by: INTERNAL MEDICINE

## 2020-10-26 RX ORDER — ATORVASTATIN CALCIUM 20 MG/1
20 TABLET, FILM COATED ORAL
Qty: 90 TABLET | Refills: 3 | Status: SHIPPED | OUTPATIENT
Start: 2020-10-26 | End: 2021-04-30

## 2020-10-26 RX ORDER — HYDROCHLOROTHIAZIDE 25 MG/1
TABLET ORAL
Qty: 90 TABLET | Refills: 3 | Status: SHIPPED | OUTPATIENT
Start: 2020-10-26 | End: 2021-10-04

## 2020-10-26 NOTE — PROGRESS NOTES
Negar Cabrales is a 72year old female who presents for a complete physical exam.    Patient has , HTN, hyperlipidemia,sever osteopenia , seasonal allergies , vit d def.      Right breast CA stage Ia  (c T1c, c N0(f)m cN)(f), ER positive, SD positiv once daily. 90 tablet 3   • hydrochlorothiazide 25 MG Oral Tab TAKE 1 TABLET BY MOUTH EVERY DAY AS NEEDED 90 tablet 3   • Risedronate Sodium 150 MG Oral Tab Take 1 tablet (150 mg total) by mouth every 30 (thirty) days.  3 tablet 3   • Flaxseed, Linseed, (FL HISTORY      Injection Tendon Sheath, Ligament   • OTHER SURGICAL HISTORY Left 2012    Bone spur removal   • RADIATION RIGHT        Family History   Problem Relation Age of Onset   • Breast Cancer Sister 28        2nd dx @ 79, contralateral   • Other (isaac mass,  S/p right lumpectomy. No nipple discharge, dimpling. No axillary and supraclavicular adenopathy. LUNGS: clear to auscultation  CARDIO: RRR without murmur  GI: good BS's,no masses, HSM or tenderness  MUSCULOSKELETAL: back is not tender.  No joint s

## 2020-10-26 NOTE — PROGRESS NOTES
Patient presented to clinic to receive influenza vaccination (high dose)  Name and  verified. Vaccine administered on the left arm, tolerated well without complications. Influenza consent form filled out and signed. VIS given to patient.     Patient p

## 2020-10-26 NOTE — PATIENT INSTRUCTIONS
Eating Heart-Healthy Foods  Eating has a big impact on your heart health. In fact, eating healthier can improve several of your heart risks at once. For instance, it helps you manage weight, cholesterol, and blood pressure.  Here are ideas to help you cleo Aim to make these foods staples of your diet. If you have diabetes, you may have different recommendations than what is listed here:  · Fruits and vegetables provide plenty of nutrients without a lot of calories.  At meals, fill half your plate with these f · Choose ingredients that spice up your food without adding calories, fat, or sodium. Try these items: horseradish, hot sauce, lemon, mustard, nonfat salad dressings, and vinegar.  For salt-free herbs and spices, try basil, cilantro, cinnamon, pepper, and r

## 2020-10-28 PROBLEM — I10 ESSENTIAL HYPERTENSION: Status: ACTIVE | Noted: 2020-10-28

## 2020-10-31 ENCOUNTER — LAB ENCOUNTER (OUTPATIENT)
Dept: LAB | Facility: REFERENCE LAB | Age: 65
End: 2020-10-31
Attending: INTERNAL MEDICINE
Payer: COMMERCIAL

## 2020-10-31 DIAGNOSIS — Z00.00 GENERAL MEDICAL EXAM: ICD-10-CM

## 2020-10-31 DIAGNOSIS — R73.9 HYPERGLYCEMIA: ICD-10-CM

## 2020-10-31 DIAGNOSIS — E55.9 VITAMIN D DEFICIENCY: ICD-10-CM

## 2020-10-31 PROCEDURE — 83036 HEMOGLOBIN GLYCOSYLATED A1C: CPT

## 2020-10-31 PROCEDURE — 82306 VITAMIN D 25 HYDROXY: CPT

## 2020-10-31 PROCEDURE — 36415 COLL VENOUS BLD VENIPUNCTURE: CPT

## 2020-10-31 PROCEDURE — 84443 ASSAY THYROID STIM HORMONE: CPT

## 2020-10-31 PROCEDURE — 80061 LIPID PANEL: CPT

## 2020-10-31 PROCEDURE — 80053 COMPREHEN METABOLIC PANEL: CPT

## 2020-10-31 PROCEDURE — 85025 COMPLETE CBC W/AUTO DIFF WBC: CPT

## 2020-11-01 DIAGNOSIS — E88.09 HYPERPROTEINEMIA: Primary | ICD-10-CM

## 2020-11-07 ENCOUNTER — LAB ENCOUNTER (OUTPATIENT)
Dept: LAB | Facility: HOSPITAL | Age: 65
End: 2020-11-07
Attending: INTERNAL MEDICINE
Payer: COMMERCIAL

## 2020-11-07 DIAGNOSIS — E88.09 HYPERPROTEINEMIA: ICD-10-CM

## 2020-11-07 PROCEDURE — 36415 COLL VENOUS BLD VENIPUNCTURE: CPT

## 2020-11-07 PROCEDURE — 84156 ASSAY OF PROTEIN URINE: CPT

## 2020-11-07 PROCEDURE — 84166 PROTEIN E-PHORESIS/URINE/CSF: CPT

## 2020-11-07 PROCEDURE — 80053 COMPREHEN METABOLIC PANEL: CPT

## 2020-11-07 PROCEDURE — 84165 PROTEIN E-PHORESIS SERUM: CPT

## 2020-11-07 PROCEDURE — 86335 IMMUNFIX E-PHORSIS/URINE/CSF: CPT

## 2020-11-07 PROCEDURE — 86334 IMMUNOFIX E-PHORESIS SERUM: CPT

## 2020-11-14 ENCOUNTER — APPOINTMENT (OUTPATIENT)
Dept: LAB | Facility: REFERENCE LAB | Age: 65
End: 2020-11-14
Attending: INTERNAL MEDICINE
Payer: COMMERCIAL

## 2020-11-14 PROCEDURE — 84166 PROTEIN E-PHORESIS/URINE/CSF: CPT

## 2020-11-14 PROCEDURE — 86335 IMMUNFIX E-PHORSIS/URINE/CSF: CPT

## 2021-01-12 ENCOUNTER — OFFICE VISIT (OUTPATIENT)
Dept: HEMATOLOGY/ONCOLOGY | Facility: HOSPITAL | Age: 66
End: 2021-01-12
Attending: NURSE PRACTITIONER
Payer: COMMERCIAL

## 2021-01-12 ENCOUNTER — HOSPITAL ENCOUNTER (OUTPATIENT)
Dept: MAMMOGRAPHY | Facility: HOSPITAL | Age: 66
Discharge: HOME | End: 2021-01-12
Attending: INTERNAL MEDICINE
Payer: COMMERCIAL

## 2021-01-12 VITALS
DIASTOLIC BLOOD PRESSURE: 84 MMHG | HEIGHT: 62 IN | BODY MASS INDEX: 24.48 KG/M2 | TEMPERATURE: 97 F | OXYGEN SATURATION: 98 % | WEIGHT: 133 LBS | HEART RATE: 71 BPM | SYSTOLIC BLOOD PRESSURE: 153 MMHG | RESPIRATION RATE: 16 BRPM

## 2021-01-12 DIAGNOSIS — Z78.0 POST-MENOPAUSAL: ICD-10-CM

## 2021-01-12 DIAGNOSIS — C50.411 MALIGNANT NEOPLASM OF UPPER-OUTER QUADRANT OF RIGHT BREAST IN FEMALE, ESTROGEN RECEPTOR POSITIVE (HCC): ICD-10-CM

## 2021-01-12 DIAGNOSIS — Z17.0 MALIGNANT NEOPLASM OF UPPER-OUTER QUADRANT OF RIGHT BREAST IN FEMALE, ESTROGEN RECEPTOR POSITIVE (HCC): Primary | ICD-10-CM

## 2021-01-12 DIAGNOSIS — C50.411 MALIGNANT NEOPLASM OF UPPER-OUTER QUADRANT OF RIGHT BREAST IN FEMALE, ESTROGEN RECEPTOR POSITIVE (HCC): Primary | ICD-10-CM

## 2021-01-12 DIAGNOSIS — Z51.81 ENCOUNTER FOR MONITORING AROMATASE INHIBITOR THERAPY: ICD-10-CM

## 2021-01-12 DIAGNOSIS — Z79.811 ENCOUNTER FOR MONITORING AROMATASE INHIBITOR THERAPY: ICD-10-CM

## 2021-01-12 DIAGNOSIS — Z17.0 MALIGNANT NEOPLASM OF UPPER-OUTER QUADRANT OF RIGHT BREAST IN FEMALE, ESTROGEN RECEPTOR POSITIVE (HCC): ICD-10-CM

## 2021-01-12 PROCEDURE — 99214 OFFICE O/P EST MOD 30 MIN: CPT | Performed by: INTERNAL MEDICINE

## 2021-01-12 PROCEDURE — 77061 BREAST TOMOSYNTHESIS UNI: CPT | Performed by: INTERNAL MEDICINE

## 2021-01-12 PROCEDURE — 77065 DX MAMMO INCL CAD UNI: CPT | Performed by: INTERNAL MEDICINE

## 2021-01-12 RX ORDER — MAG HYDROX/ALUMINUM HYD/SIMETH 400-400-40
5000 SUSPENSION, ORAL (FINAL DOSE FORM) ORAL DAILY
COMMUNITY

## 2021-01-12 NOTE — PROGRESS NOTES
BERTRAM     Quincy Francis is a 72year old female who is here today for follow-up of diagnosis of Malignant neoplasm of upper-outer quadrant of right breast in female, estrogen receptor positive (hcc)  (primary encounter diagnosis)  Encounter for mo • NON FORMULARY daily. • Clobetasol Propionate 0.05 % External Ointment Apply to affected areas 1x/day as needed 45 g 2   • Turmeric 500 MG Oral Tab Take 1 tablet by mouth daily. • Coenzyme Q10 100 MG Oral Cap Take 100 mg by mouth daily.      • Vi contralateral   • Other (gastro-intestinal cancer) Father 80        d.84; non-smoker   • Other (gastric cancer) Brother 61        fmr smoker   • Cancer Paternal Aunt         cervical ca   • Stroke Mother    • Breast Cancer Sister 68   • Prostate Cancer Bro Oncotype DX score: 18) - Signed by Griffin Redding MD on 10/30/2019      Frieda Puentes is a 72year old female with ER/TX positive breast cancer, node negative. Patient status post lumpectomy, and status post radiation therapy.     Oncotype scor ([id number]), 10/31/2017, 4:26 PM.  [facility], [facility] VIJAYA   2D+3D SCRN [facility] W/CAD BILAT (KQA=/[id number]), 10/03/2016, 3:51 PM.  [facility], [facility] DIGITAL W CAD SCREEN PLUS, 1/02/2015, 1:10 PM.  [facility] this mammogram indicate a potentially increased risk for breast cancer.  It is recommended that this patient be evaluated in our [address] [place] [address] to determine   eligibility for additional breast cancer screening, risk reduction strategies and/o

## 2021-01-20 ENCOUNTER — TELEPHONE (OUTPATIENT)
Dept: HEMATOLOGY/ONCOLOGY | Facility: HOSPITAL | Age: 66
End: 2021-01-20

## 2021-01-20 NOTE — TELEPHONE ENCOUNTER
Patient called and was looking to schedule her follow up on 7/12. I told her there was no openings that day so I went ahead and scheduled her the following day. He would like to have all her appointments on the same day.

## 2021-01-20 NOTE — TELEPHONE ENCOUNTER
Called patient verified that there are no appointments available on 7/12 as requested. Patient agreed to appointment to see Dr. Stephania Luis on 7/13/20.

## 2021-03-09 DIAGNOSIS — Z23 NEED FOR VACCINATION: ICD-10-CM

## 2021-03-13 ENCOUNTER — IMMUNIZATION (OUTPATIENT)
Dept: LAB | Facility: HOSPITAL | Age: 66
End: 2021-03-13
Attending: HOSPITALIST
Payer: COMMERCIAL

## 2021-03-13 DIAGNOSIS — Z23 NEED FOR VACCINATION: Primary | ICD-10-CM

## 2021-03-13 PROCEDURE — 0011A SARSCOV2 VAC 100MCG/0.5ML IM: CPT

## 2021-04-10 ENCOUNTER — IMMUNIZATION (OUTPATIENT)
Dept: LAB | Facility: HOSPITAL | Age: 66
End: 2021-04-10
Attending: EMERGENCY MEDICINE
Payer: COMMERCIAL

## 2021-04-10 DIAGNOSIS — Z23 NEED FOR VACCINATION: Primary | ICD-10-CM

## 2021-04-10 PROCEDURE — 0012A SARSCOV2 VAC 100MCG/0.5ML IM: CPT

## 2021-04-27 ENCOUNTER — OFFICE VISIT (OUTPATIENT)
Dept: INTERNAL MEDICINE CLINIC | Facility: CLINIC | Age: 66
End: 2021-04-27

## 2021-04-27 ENCOUNTER — HOSPITAL ENCOUNTER (OUTPATIENT)
Dept: GENERAL RADIOLOGY | Facility: HOSPITAL | Age: 66
Discharge: HOME | End: 2021-04-27
Attending: INTERNAL MEDICINE
Payer: COMMERCIAL

## 2021-04-27 VITALS
SYSTOLIC BLOOD PRESSURE: 125 MMHG | HEART RATE: 58 BPM | OXYGEN SATURATION: 100 % | DIASTOLIC BLOOD PRESSURE: 73 MMHG | HEIGHT: 62 IN | BODY MASS INDEX: 25.4 KG/M2 | WEIGHT: 138 LBS

## 2021-04-27 DIAGNOSIS — E03.9 ACQUIRED HYPOTHYROIDISM: ICD-10-CM

## 2021-04-27 DIAGNOSIS — E88.09 HYPERPROTEINEMIA: ICD-10-CM

## 2021-04-27 DIAGNOSIS — Z17.0 MALIGNANT NEOPLASM OF UPPER-OUTER QUADRANT OF RIGHT BREAST IN FEMALE, ESTROGEN RECEPTOR POSITIVE (HCC): ICD-10-CM

## 2021-04-27 DIAGNOSIS — G89.29 CHRONIC RIGHT-SIDED LOW BACK PAIN WITH RIGHT-SIDED SCIATICA: Primary | ICD-10-CM

## 2021-04-27 DIAGNOSIS — G89.29 CHRONIC RIGHT-SIDED LOW BACK PAIN WITH RIGHT-SIDED SCIATICA: ICD-10-CM

## 2021-04-27 DIAGNOSIS — C50.411 MALIGNANT NEOPLASM OF UPPER-OUTER QUADRANT OF RIGHT BREAST IN FEMALE, ESTROGEN RECEPTOR POSITIVE (HCC): ICD-10-CM

## 2021-04-27 DIAGNOSIS — M54.41 CHRONIC RIGHT-SIDED LOW BACK PAIN WITH RIGHT-SIDED SCIATICA: Primary | ICD-10-CM

## 2021-04-27 DIAGNOSIS — M54.41 CHRONIC RIGHT-SIDED LOW BACK PAIN WITH RIGHT-SIDED SCIATICA: ICD-10-CM

## 2021-04-27 DIAGNOSIS — E55.9 VITAMIN D DEFICIENCY: ICD-10-CM

## 2021-04-27 DIAGNOSIS — Z23 NEED FOR VACCINATION FOR ZOSTER: ICD-10-CM

## 2021-04-27 DIAGNOSIS — I10 ESSENTIAL HYPERTENSION: ICD-10-CM

## 2021-04-27 DIAGNOSIS — M81.0 AGE-RELATED OSTEOPOROSIS WITHOUT CURRENT PATHOLOGICAL FRACTURE: ICD-10-CM

## 2021-04-27 DIAGNOSIS — T45.1X5A AROMATASE INHIBITOR-ASSOCIATED ARTHRALGIA: ICD-10-CM

## 2021-04-27 DIAGNOSIS — M25.50 AROMATASE INHIBITOR-ASSOCIATED ARTHRALGIA: ICD-10-CM

## 2021-04-27 DIAGNOSIS — E78.2 MIXED HYPERLIPIDEMIA: ICD-10-CM

## 2021-04-27 PROCEDURE — 3078F DIAST BP <80 MM HG: CPT | Performed by: INTERNAL MEDICINE

## 2021-04-27 PROCEDURE — 72110 X-RAY EXAM L-2 SPINE 4/>VWS: CPT | Performed by: INTERNAL MEDICINE

## 2021-04-27 PROCEDURE — 80053 COMPREHEN METABOLIC PANEL: CPT | Performed by: INTERNAL MEDICINE

## 2021-04-27 PROCEDURE — 3008F BODY MASS INDEX DOCD: CPT | Performed by: INTERNAL MEDICINE

## 2021-04-27 PROCEDURE — 82306 VITAMIN D 25 HYDROXY: CPT | Performed by: INTERNAL MEDICINE

## 2021-04-27 PROCEDURE — 99214 OFFICE O/P EST MOD 30 MIN: CPT | Performed by: INTERNAL MEDICINE

## 2021-04-27 PROCEDURE — 36415 COLL VENOUS BLD VENIPUNCTURE: CPT | Performed by: INTERNAL MEDICINE

## 2021-04-27 PROCEDURE — 3074F SYST BP LT 130 MM HG: CPT | Performed by: INTERNAL MEDICINE

## 2021-04-27 NOTE — PROGRESS NOTES
HPI:    Patient ID: Martin Clark is a 72year old female. HPI  Here for f/u up    H/o HTN, HLD, Vit  d def,  Rt breast Ca , osteoporosis. C/o low back pain every morning when getting out of bed. Could hardly walk.  Takes tylenol 500 mgs for VOMITING  Latex                       Comment:Other reaction(s): LATEX    HISTORY:  Past Medical History:   Diagnosis Date   • [facility]    • Chicken pox    • Headache    • High blood pressure    • High cholesterol    • Meniere disease    • Osteo Current Outpatient Medications   Medication Sig Dispense Refill   • cholecalciferol (VITAMIN D3) 125 MCG (5000 UT) Oral Cap Take 5,000 Units by mouth daily. • Calcium Carbonate Antacid 600 MG Oral Chew Tab Chew 1,200 mg by mouth.      • atorvastatin 2 Mouth: Mucous membranes are moist.   Eyes:      General: No scleral icterus. Extraocular Movements: Extraocular movements intact. Pupils: Pupils are equal, round, and reactive to light.    Cardiovascular:      Rate and Rhythm: Normal rate and towel. Try both heat and cold to see which works best. Use the method that feels best for 20 minutes several times a day. · Do not take acetaminophen > 3000 mgs daily   · Use safe lifting methods.  Don’t lift anything heavier than 15 pounds until all of th

## 2021-04-29 NOTE — PATIENT INSTRUCTIONS
Sciatica     Sciatica is a condition that causes pain in the lower back that spreads down into the buttock, hip, and leg. Sometimes the leg pain can happen without any back pain.  Sciatica happens when a spinal nerve is irritated or has pressure put on it under your knees. You can also try lying on your side with your knees bent up toward your chest and a pillow between your knees. · Don't sit for long periods. This puts more stress on your lower back than standing or walking.   · Use heat from a hot shower

## 2021-04-30 DIAGNOSIS — R79.89 ELEVATED LFTS: ICD-10-CM

## 2021-04-30 DIAGNOSIS — M54.16 LUMBAR RADICULOPATHY: Primary | ICD-10-CM

## 2021-05-25 ENCOUNTER — HOSPITAL ENCOUNTER (OUTPATIENT)
Dept: ULTRASOUND IMAGING | Age: 66
Discharge: HOME | End: 2021-05-25
Attending: INTERNAL MEDICINE
Payer: COMMERCIAL

## 2021-05-25 DIAGNOSIS — R79.89 ELEVATED LFTS: ICD-10-CM

## 2021-05-25 PROCEDURE — 76705 ECHO EXAM OF ABDOMEN: CPT | Performed by: INTERNAL MEDICINE

## 2021-06-09 ENCOUNTER — OFFICE VISIT (OUTPATIENT)
Dept: NEUROLOGY | Facility: CLINIC | Age: 66
End: 2021-06-09

## 2021-06-09 VITALS
SYSTOLIC BLOOD PRESSURE: 130 MMHG | HEIGHT: 62 IN | DIASTOLIC BLOOD PRESSURE: 80 MMHG | OXYGEN SATURATION: 98 % | HEART RATE: 74 BPM | BODY MASS INDEX: 25.4 KG/M2 | WEIGHT: 138 LBS

## 2021-06-09 DIAGNOSIS — M81.0 AGE-RELATED OSTEOPOROSIS WITHOUT CURRENT PATHOLOGICAL FRACTURE: ICD-10-CM

## 2021-06-09 DIAGNOSIS — Z85.3 HISTORY OF BREAST CANCER: ICD-10-CM

## 2021-06-09 DIAGNOSIS — M54.16 LUMBAR RADICULOPATHY: Primary | ICD-10-CM

## 2021-06-09 PROCEDURE — 3079F DIAST BP 80-89 MM HG: CPT | Performed by: PHYSICAL MEDICINE & REHABILITATION

## 2021-06-09 PROCEDURE — 3008F BODY MASS INDEX DOCD: CPT | Performed by: PHYSICAL MEDICINE & REHABILITATION

## 2021-06-09 PROCEDURE — 99204 OFFICE O/P NEW MOD 45 MIN: CPT | Performed by: PHYSICAL MEDICINE & REHABILITATION

## 2021-06-09 PROCEDURE — 3075F SYST BP GE 130 - 139MM HG: CPT | Performed by: PHYSICAL MEDICINE & REHABILITATION

## 2021-06-09 RX ORDER — CYCLOBENZAPRINE HCL 10 MG
10 TABLET ORAL NIGHTLY
Qty: 30 TABLET | Refills: 0 | Status: SHIPPED | OUTPATIENT
Start: 2021-06-09 | End: 2021-07-09 | Stop reason: SINTOL

## 2021-06-09 RX ORDER — PREDNISONE 10 MG/1
TABLET ORAL
Qty: 28 TABLET | Refills: 0 | Status: SHIPPED | OUTPATIENT
Start: 2021-06-09 | End: 2021-07-09 | Stop reason: ALTCHOICE

## 2021-06-09 NOTE — PROGRESS NOTES
[address]  Progress Note    CHIEF COMPLAINT:  Patient presents with:  New Patient  Back Pain: c/o right low back radiates to right back leg [id number]. xrays done 4/27/21.   takes tylenol for pain,  rates pain mouth nightly.  30 tablet 0   • predniSONE 10 MG Oral Tab Take 7 tablets today,take 6 tablets tomorrow, then decrease number of tablets by one tablet each of the remaining days 28 tablet 0   • cholecalciferol (VITAMIN D3) 125 MCG (5000 UT) Oral Cap Take 5,0 kg/m². General: No immediate distress  Head: Normocephalic/ Atraumatic  Extremities: No lower extremity edema bilaterally. Peripheral pulses intact.    Spine: Limited lumbar extension recreates leg pain, hard to stand straight, no percussion tenderness

## 2021-06-22 ENCOUNTER — NURSE ONLY (OUTPATIENT)
Dept: INTERNAL MEDICINE CLINIC | Facility: CLINIC | Age: 66
End: 2021-06-22

## 2021-06-22 PROCEDURE — 90471 IMMUNIZATION ADMIN: CPT | Performed by: INTERNAL MEDICINE

## 2021-06-22 PROCEDURE — 90750 HZV VACC RECOMBINANT IM: CPT | Performed by: INTERNAL MEDICINE

## 2021-06-29 PROBLEM — M54.16 LUMBAR RADICULOPATHY: Status: ACTIVE | Noted: 2021-06-29

## 2021-06-29 PROBLEM — Z85.3 HISTORY OF BREAST CANCER: Status: ACTIVE | Noted: 2021-06-29

## 2021-07-01 ENCOUNTER — HOSPITAL ENCOUNTER (OUTPATIENT)
Dept: MRI IMAGING | Facility: HOSPITAL | Age: 66
Discharge: HOME | End: 2021-07-01
Attending: PHYSICAL MEDICINE & REHABILITATION
Payer: COMMERCIAL

## 2021-07-01 DIAGNOSIS — M54.16 LUMBAR RADICULOPATHY: ICD-10-CM

## 2021-07-01 DIAGNOSIS — Z85.3 HISTORY OF BREAST CANCER: ICD-10-CM

## 2021-07-01 LAB — CREAT BLD-MCNC: 0.7 MG/DL

## 2021-07-01 PROCEDURE — 72158 MRI LUMBAR SPINE W/O & W/DYE: CPT | Performed by: PHYSICAL MEDICINE & REHABILITATION

## 2021-07-01 PROCEDURE — 82565 ASSAY OF CREATININE: CPT

## 2021-07-01 PROCEDURE — A9575 INJ GADOTERATE MEGLUMI 0.1ML: HCPCS | Performed by: PHYSICAL MEDICINE & REHABILITATION

## 2021-07-02 ENCOUNTER — TELEPHONE (OUTPATIENT)
Dept: NEUROLOGY | Facility: CLINIC | Age: 66
End: 2021-07-02

## 2021-07-02 DIAGNOSIS — M48.062 SPINAL STENOSIS OF LUMBAR REGION WITH NEUROGENIC CLAUDICATION: Primary | ICD-10-CM

## 2021-07-02 NOTE — TELEPHONE ENCOUNTER
I personally reviewed a lumbar MRI showing moderate to severe L45 central canal stenosis due in part to a right L45 facet cyst. Other lesser areas of scattered degenerative pathology. No metastasis.     Please let the patient know she has spinal stenosis wh

## 2021-07-02 NOTE — TELEPHONE ENCOUNTER
Physical Therapy CPT CODE:  17188,[id number] approval  Clinical notes sent to [facility] for review

## 2021-07-06 RX ORDER — GABAPENTIN 100 MG/1
100 CAPSULE ORAL 3 TIMES DAILY
Qty: 90 CAPSULE | Refills: 0 | Status: SHIPPED | OUTPATIENT
Start: 2021-07-06 | End: 2021-08-02

## 2021-07-06 NOTE — TELEPHONE ENCOUNTER
I agree with your advice. I recommend that she start gabapentin 100 mg 3 times daily. See order and she should follow-up with me on the ninth.

## 2021-07-06 NOTE — TELEPHONE ENCOUNTER
Informed patient Dr. Alexandro Sommesr sent over rx. Patient is worried it will make her drowsy informed her to take first dose at night then if feeling okay proceed to take. Patient verbalized understanding and is keeping her appointment.

## 2021-07-06 NOTE — TELEPHONE ENCOUNTER
S/w patient in regards to results. No questions asked and information for PT has been given. Patient is requesting something for pain. Takes Tylenol 1000mg. If she would continue with PT, but the pain is excruciating.  Patient was taking Cyclobenzapr

## 2021-07-09 ENCOUNTER — TELEPHONE (OUTPATIENT)
Dept: PHYSICAL THERAPY | Facility: HOSPITAL | Age: 66
End: 2021-07-09

## 2021-07-09 ENCOUNTER — OFFICE VISIT (OUTPATIENT)
Dept: NEUROLOGY | Facility: CLINIC | Age: 66
End: 2021-07-09

## 2021-07-09 ENCOUNTER — TELEPHONE (OUTPATIENT)
Dept: NEUROLOGY | Facility: CLINIC | Age: 66
End: 2021-07-09

## 2021-07-09 VITALS — BODY MASS INDEX: 23.74 KG/M2 | HEART RATE: 82 BPM | HEIGHT: 62 IN | WEIGHT: 129 LBS | OXYGEN SATURATION: 97 %

## 2021-07-09 DIAGNOSIS — Z85.3 HISTORY OF BREAST CANCER: ICD-10-CM

## 2021-07-09 DIAGNOSIS — M81.0 AGE-RELATED OSTEOPOROSIS WITHOUT CURRENT PATHOLOGICAL FRACTURE: ICD-10-CM

## 2021-07-09 DIAGNOSIS — M48.062 SPINAL STENOSIS OF LUMBAR REGION WITH NEUROGENIC CLAUDICATION: Primary | ICD-10-CM

## 2021-07-09 PROCEDURE — 3008F BODY MASS INDEX DOCD: CPT | Performed by: PHYSICAL MEDICINE & REHABILITATION

## 2021-07-09 PROCEDURE — 99214 OFFICE O/P EST MOD 30 MIN: CPT | Performed by: PHYSICAL MEDICINE & REHABILITATION

## 2021-07-09 NOTE — TELEPHONE ENCOUNTER
Physical therapy - APPROVED     Received in-basket from [facility] with approval for above.      Patient notified via [facility] chart

## 2021-07-09 NOTE — TELEPHONE ENCOUNTER
Right L4 (L4-5) Right L5 (L5-S1) TFESI -pending MD Clinical note  Will initiate authorization once clincal notes are available

## 2021-07-09 NOTE — PROGRESS NOTES
[address]  Progress Note    CHIEF COMPLAINT:  Patient presents with:  Hip Pain: Pt comes in with pain right side buttock pain that radiates to ankle. Admits N/T.  Denies PT, imaging, injections Contacted PT History      Occupation: retired HD RN    Tobacco Use      Smoking status: Never Smoker      Smokeless tobacco: Never Used    Substance and Sexual Activity      Alcohol use: No      Drug use: No      Sexual activity: Not on file      CURRENT MEDICATIONS: mass index is 23.59 kg/m².       General: No immediate distress  Extremities: No lower extremity edema bilaterally   Spine: Limited lumbar extension recreates leg pain, hard to stand straight, no percussion tenderness  Hips: full and painfree ROM   Neuro: answered. There were no barriers to learning.         Di Apgar, MD  Physical Medicine and Rehabilitation/Sports Medicine  MEDICAL [facility]

## 2021-07-12 ENCOUNTER — HOSPITAL ENCOUNTER (OUTPATIENT)
Dept: BONE DENSITY | Facility: HOSPITAL | Age: 66
Discharge: HOME | End: 2021-07-12
Attending: INTERNAL MEDICINE
Payer: COMMERCIAL

## 2021-07-12 ENCOUNTER — HOSPITAL ENCOUNTER (OUTPATIENT)
Dept: MAMMOGRAPHY | Facility: HOSPITAL | Age: 66
Discharge: HOME | End: 2021-07-12
Attending: INTERNAL MEDICINE
Payer: COMMERCIAL

## 2021-07-12 ENCOUNTER — ORDER TRANSCRIPTION (OUTPATIENT)
Dept: PHYSICAL THERAPY | Age: 66
End: 2021-07-12

## 2021-07-12 ENCOUNTER — TELEPHONE (OUTPATIENT)
Dept: PHYSICAL THERAPY | Facility: HOSPITAL | Age: 66
End: 2021-07-12

## 2021-07-12 DIAGNOSIS — Z17.0 MALIGNANT NEOPLASM OF UPPER-OUTER QUADRANT OF RIGHT BREAST IN FEMALE, ESTROGEN RECEPTOR POSITIVE (HCC): ICD-10-CM

## 2021-07-12 DIAGNOSIS — C50.411 MALIGNANT NEOPLASM OF UPPER-OUTER QUADRANT OF RIGHT BREAST IN FEMALE, ESTROGEN RECEPTOR POSITIVE (HCC): ICD-10-CM

## 2021-07-12 DIAGNOSIS — M48.062 SPINAL STENOSIS, LUMBAR REGION, WITH NEUROGENIC CLAUDICATION: Primary | ICD-10-CM

## 2021-07-12 DIAGNOSIS — Z78.0 POST-MENOPAUSAL: ICD-10-CM

## 2021-07-12 PROCEDURE — 77066 DX MAMMO INCL CAD BI: CPT | Performed by: INTERNAL MEDICINE

## 2021-07-12 PROCEDURE — 77080 DXA BONE DENSITY AXIAL: CPT | Performed by: INTERNAL MEDICINE

## 2021-07-12 PROCEDURE — 77062 BREAST TOMOSYNTHESIS BI: CPT | Performed by: INTERNAL MEDICINE

## 2021-07-12 NOTE — TELEPHONE ENCOUNTER
Right L4 (L4-5) Right L5 (L5-S1) TFESI CPT CODE: 62341, 00729, [id number] approval  Clinical notes sent to [facility] for review

## 2021-07-13 ENCOUNTER — OFFICE VISIT (OUTPATIENT)
Dept: HEMATOLOGY/ONCOLOGY | Facility: HOSPITAL | Age: 66
End: 2021-07-13
Attending: INTERNAL MEDICINE
Payer: COMMERCIAL

## 2021-07-13 ENCOUNTER — TELEPHONE (OUTPATIENT)
Dept: PHYSICAL THERAPY | Facility: HOSPITAL | Age: 66
End: 2021-07-13

## 2021-07-13 VITALS
BODY MASS INDEX: 24.29 KG/M2 | HEIGHT: 62 IN | DIASTOLIC BLOOD PRESSURE: 72 MMHG | WEIGHT: 132 LBS | RESPIRATION RATE: 16 BRPM | HEART RATE: 67 BPM | SYSTOLIC BLOOD PRESSURE: 134 MMHG | OXYGEN SATURATION: 99 % | TEMPERATURE: 97 F

## 2021-07-13 DIAGNOSIS — T45.1X5A AROMATASE INHIBITOR-ASSOCIATED ARTHRALGIA: ICD-10-CM

## 2021-07-13 DIAGNOSIS — Z51.81 ENCOUNTER FOR MONITORING AROMATASE INHIBITOR THERAPY: ICD-10-CM

## 2021-07-13 DIAGNOSIS — Z78.0 OSTEOPENIA AFTER MENOPAUSE: ICD-10-CM

## 2021-07-13 DIAGNOSIS — C50.411 MALIGNANT NEOPLASM OF UPPER-OUTER QUADRANT OF RIGHT BREAST IN FEMALE, ESTROGEN RECEPTOR POSITIVE (HCC): Primary | ICD-10-CM

## 2021-07-13 DIAGNOSIS — M25.50 AROMATASE INHIBITOR-ASSOCIATED ARTHRALGIA: ICD-10-CM

## 2021-07-13 DIAGNOSIS — Z79.811 ENCOUNTER FOR MONITORING AROMATASE INHIBITOR THERAPY: ICD-10-CM

## 2021-07-13 DIAGNOSIS — Z17.0 MALIGNANT NEOPLASM OF UPPER-OUTER QUADRANT OF RIGHT BREAST IN FEMALE, ESTROGEN RECEPTOR POSITIVE (HCC): Primary | ICD-10-CM

## 2021-07-13 DIAGNOSIS — M85.80 OSTEOPENIA AFTER MENOPAUSE: ICD-10-CM

## 2021-07-13 PROCEDURE — 99214 OFFICE O/P EST MOD 30 MIN: CPT | Performed by: INTERNAL MEDICINE

## 2021-07-13 RX ORDER — ATORVASTATIN CALCIUM 20 MG/1
20 TABLET, FILM COATED ORAL DAILY
COMMUNITY
Start: 2021-07-09 | End: 2021-10-04

## 2021-07-13 RX ORDER — LETROZOLE 2.5 MG/1
2.5 TABLET, FILM COATED ORAL DAILY
Qty: 90 TABLET | Refills: 3 | Status: SHIPPED | OUTPATIENT
Start: 2021-07-13 | End: 2021-07-20

## 2021-07-13 NOTE — PROGRESS NOTES
BERTRAM     Stef Curiel is a 72year old female who is here today for follow-up of diagnosis of Malignant neoplasm of upper-outer quadrant of right breast in female, estrogen receptor positive (hcc)  (primary encounter diagnosis)  Encounter for mo Sodium 150 MG Oral Tab Take 1 tablet (150 mg total) by mouth every 30 (thirty) days. 3 tablet 3   • Flaxseed, Linseed, (FLAX SEED OIL) 1000 MG Oral Cap Take by mouth daily.      • Clobetasol Propionate 0.05 % External Ointment Apply to affected areas 1x/day smoker   • Cancer Paternal Aunt         cervical ca   • Stroke Mother    • Breast Cancer Sister 68   • Prostate Cancer Brother 62   • Breast Cancer Self 61         PHYSICAL EXAM:    /72 (BP Location: Left arm, Patient Position: Sitting, Cuff Size: ad 10/30/2019      Ghassan Flores is a 72year old female with ER/MD positive breast cancer, node negative. Patient status post lumpectomy, and status post radiation therapy. Oncotype score low risk or recurrence, with Oncotype DX score of 18. Osteoporosis recommendations for treatment:   Post menopausal women and men age 48 and older presenting with the following should be considered for treatment:   * A hip or vertebral (clinical or morphometric) fracture   * T score < -2.5 at the femoral neck spiculation, skin thickening or nipple retraction. Scattered benign-appearing calcifications are present. No significant change has occurred. On the right postoperative changes are noted. Surgical clips are seen.   Benign-appearing calcifications are

## 2021-07-13 NOTE — TELEPHONE ENCOUNTER
Phoned pt to offer PT appts sooner than she is currently scheduled to start PT. She would still like to wait until after her lumbar injection to bring the pain level down, which she states is > 10/10.  Advised her to contact us when she gets the injection

## 2021-07-21 RX ORDER — LETROZOLE 2.5 MG/1
2.5 TABLET, FILM COATED ORAL DAILY
Qty: 90 TABLET | Refills: 3 | Status: SHIPPED | OUTPATIENT
Start: 2021-07-21 | End: 2021-07-22

## 2021-07-21 RX ORDER — RISEDRONATE SODIUM 150 MG/1
150 TABLET, FILM COATED ORAL
Qty: 3 TABLET | Refills: 3 | Status: SHIPPED | OUTPATIENT
Start: 2021-07-21

## 2021-07-22 RX ORDER — LETROZOLE 2.5 MG/1
2.5 TABLET, FILM COATED ORAL DAILY
Qty: 90 TABLET | Refills: 3 | Status: SHIPPED | OUTPATIENT
Start: 2021-07-22 | End: 2022-07-23

## 2021-07-30 NOTE — TELEPHONE ENCOUNTER
Right L4 (L4-5) Right L5 (L5-S1) TFESI CPT CODE: 32456, 92252, [id number]   EXPIRES: 10/29/21     Received in-basket from [facility] with approval for above. Notified ESMER Approved Referral for scheduling.

## 2021-08-02 NOTE — TELEPHONE ENCOUNTER
Patient has been scheduled for a Right L4 Right L5 transforaminal epidural steroid injections  on 08/05/21 at the [facility]. Medications and allergies reviewed.  Patient informed we will need verbal or written authorization from patients Primary Care Physician/C

## 2021-08-03 RX ORDER — GABAPENTIN 100 MG/1
CAPSULE ORAL
Qty: 90 CAPSULE | Refills: 0 | OUTPATIENT
Start: 2021-08-03

## 2021-08-03 RX ORDER — GABAPENTIN 100 MG/1
100 CAPSULE ORAL 3 TIMES DAILY
Qty: 90 CAPSULE | Refills: 0 | Status: SHIPPED | OUTPATIENT
Start: 2021-08-03 | End: 2021-08-24

## 2021-08-03 NOTE — TELEPHONE ENCOUNTER
Medication request: Gabapentin 100mg Take 1 capsule (100 mg total) by mouth 3 (three) times daily.     LOV: 07/09/21  NOV: 08/05/21    [place] 07/06/21 #90 r-0

## 2021-08-05 ENCOUNTER — OFFICE VISIT (OUTPATIENT)
Dept: SURGERY | Facility: CLINIC | Age: 66
End: 2021-08-05

## 2021-08-05 DIAGNOSIS — M54.16 LUMBAR RADICULOPATHY: Primary | ICD-10-CM

## 2021-08-05 PROCEDURE — 64483 NJX AA&/STRD TFRM EPI L/S 1: CPT | Performed by: PHYSICAL MEDICINE & REHABILITATION

## 2021-08-05 PROCEDURE — 64484 NJX AA&/STRD TFRM EPI L/S EA: CPT | Performed by: PHYSICAL MEDICINE & REHABILITATION

## 2021-08-13 ENCOUNTER — TELEPHONE (OUTPATIENT)
Dept: PHYSICAL THERAPY | Facility: HOSPITAL | Age: 66
End: 2021-08-13

## 2021-08-15 NOTE — PROGRESS NOTES
Preoperative Diagnosis:  (M54.16) Lumbar radiculopathy  (primary encounter diagnosis)       Postoperative Diagnosis:  (M54.16) Lumbar radiculopathy  (primary encounter diagnosis)       Procedures: Right L4 and L5 Transforaminal epidural steroid injection u

## 2021-08-16 ENCOUNTER — OFFICE VISIT (OUTPATIENT)
Dept: PHYSICAL THERAPY | Facility: HOSPITAL | Age: 66
End: 2021-08-16
Attending: PHYSICAL MEDICINE & REHABILITATION
Payer: COMMERCIAL

## 2021-08-16 PROCEDURE — 97110 THERAPEUTIC EXERCISES: CPT | Performed by: PHYSICAL THERAPIST

## 2021-08-16 PROCEDURE — 97161 PT EVAL LOW COMPLEX 20 MIN: CPT | Performed by: PHYSICAL THERAPIST

## 2021-08-16 NOTE — PROGRESS NOTES
LUMBAR SPINE EVALUATION:   Referring Physician: Dr. Burak Jones  Diagnosis:     Spinal stenosis of lumbar region with neurogenic claudication (X98.991)    Date of Service: 8/16/2021   Date of Onset: over 1 year ago    [address] ELECTROCARDIOGRAM, COMPLETE   12/11/2005     Scanned to media tab    • LUMPECTOMY RIGHT Right 10/8/19     Right lumepctomy w SLNB   • OTHER SURGICAL HISTORY         Injection Tendon Sheath, Ligament   • OTHER SURGICAL HISTORY Left 2012     Bone spur remova Modified Oswestry    FABQ          Today’s Treatment and Response:     8/16/2021  Visit #      Manual Therapy    Therapeutic Exercise Educated in centralization of symptoms and precautions for therapy    PPU's    Side glides   Therapeutic Activity    [place]guila need for these services furnished under this plan of treatment and while under my care.     X___________________________________________________ Date____________________    Certification From: 4/25/9695  To:11/14/2021

## 2021-08-20 ENCOUNTER — OFFICE VISIT (OUTPATIENT)
Dept: PHYSICAL THERAPY | Facility: HOSPITAL | Age: 66
End: 2021-08-20
Attending: PHYSICAL MEDICINE & REHABILITATION
Payer: COMMERCIAL

## 2021-08-20 DIAGNOSIS — M48.062 SPINAL STENOSIS OF LUMBAR REGION WITH NEUROGENIC CLAUDICATION: ICD-10-CM

## 2021-08-20 DIAGNOSIS — M48.062 SPINAL STENOSIS, LUMBAR REGION, WITH NEUROGENIC CLAUDICATION: ICD-10-CM

## 2021-08-20 PROCEDURE — 97112 NEUROMUSCULAR REEDUCATION: CPT | Performed by: PHYSICAL THERAPIST

## 2021-08-20 PROCEDURE — 97110 THERAPEUTIC EXERCISES: CPT | Performed by: PHYSICAL THERAPIST

## 2021-08-20 NOTE — PROGRESS NOTES
8/20/2021  Dx:      Spinal stenosis of lumbar region with neurogenic claudication     Authorized # of Visits:  8 visits on HOM          Next MD visit: none   Fall Risk: standard         Precautions:  general  Medication Changes since last visit?: No    Sub Exercises; Neuromuscular Re-education; Therapeutic Activity; Patient education: Home exercise program instruction; TNE Education; Modalities as needed.     Certification From: 1/62/6823  To:11/14/2021     Charges: TE1, NM2      Total Timed Treatment:  45mi (postoperative nausea and vomiting)     • PPD positive           SURGICAL HISTORY:        Past Surgical History:   Procedure Laterality Date   • CHOLECYSTECTOMY         lap cholecystectomy   • COLONOSCOPY N/A 12/4/2017     Procedure: COLONOSCOPY;  Surgeon: Flexibility:      R L   Hamstrings 55 degrees with SLR 55 degrees with SLR   Piriformis short WFL   Hip Flexor short WFL       Neuro Screen: toe walking and heel walking (+) for weakness R  Special Tests: (+) SLR (+) Slump    Outcome Surverys  Date

## 2021-08-23 ENCOUNTER — OFFICE VISIT (OUTPATIENT)
Dept: PHYSICAL THERAPY | Facility: HOSPITAL | Age: 66
End: 2021-08-23
Attending: PHYSICAL MEDICINE & REHABILITATION
Payer: COMMERCIAL

## 2021-08-23 DIAGNOSIS — M48.062 SPINAL STENOSIS, LUMBAR REGION, WITH NEUROGENIC CLAUDICATION: ICD-10-CM

## 2021-08-23 PROCEDURE — 97110 THERAPEUTIC EXERCISES: CPT | Performed by: PHYSICAL THERAPIST

## 2021-08-23 RX ORDER — GABAPENTIN 100 MG/1
CAPSULE ORAL
Qty: 90 CAPSULE | Refills: 0 | OUTPATIENT
Start: 2021-08-23

## 2021-08-23 RX ORDER — GABAPENTIN 100 MG/1
100 CAPSULE ORAL 3 TIMES DAILY
Qty: 90 CAPSULE | Refills: 0 | OUTPATIENT
Start: 2021-08-23 | End: 2021-09-22

## 2021-08-23 NOTE — TELEPHONE ENCOUNTER
Medication request: Gabapentin 100mg Take 1 capsule (100 mg total) by mouth 3 (three) times daily.     LOV: 8/5/21  NOV: 8/27/21     [place]8/3/21 #90 r-0

## 2021-08-23 NOTE — PROGRESS NOTES
8/23/2021  Dx:      Spinal stenosis of lumbar region with neurogenic claudication     Authorized # of Visits:  8 visits on HOM          Next MD visit: none   Fall Risk: standard         Precautions:  general  Medication Changes since last visit?: No    Sub program.  NOT MET 8/23/2021  4. The pt will be able to garden without an increase in symptoms. [address] 8/23/2021  5. The pt will be able to walk 1 mile without an increase in symptoms. NOT MET 8/23/2021  6.   The pt will be able to get OOB without an stairs. Amanda Mayra describes prior level of function independent at all activities, the patient is a retired nurse. Pt goals include decrease pain, get her \"life back\". Past medical history was reviewed with Amanda Nunez.  Significant findings include Limited 25%    R Rotation NT    L Rotation NT    R Sideglide WFL    L Sideglide WFL      Repeated Motion Testing: RFIl - increased?worse, REIL - decreased/no change, side glides in standing - decreased/wores        STRENGTH:   5/5 MMT Scale   Left  Right C

## 2021-08-24 ENCOUNTER — PATIENT MESSAGE (OUTPATIENT)
Dept: NEUROLOGY | Facility: CLINIC | Age: 66
End: 2021-08-24

## 2021-08-24 RX ORDER — GABAPENTIN 100 MG/1
100 CAPSULE ORAL 3 TIMES DAILY
Qty: 90 CAPSULE | Refills: 0 | Status: SHIPPED | OUTPATIENT
Start: 2021-08-24 | End: 2021-08-27

## 2021-08-24 NOTE — TELEPHONE ENCOUNTER
Medication request: gabapentin 100 MG Oral Cap    Take 1 capsule (100 mg total) by mouth 3 (three) times daily.     #90-R-0    LOV 07/09/21  NOV 08/27/21     [place]: 08/03/21    Please see note below            From: Negar Cabrales  To: L

## 2021-08-24 NOTE — TELEPHONE ENCOUNTER
From: Mylene Mtz  To: Arnaldo Talley MD  Sent: 8/24/2021 12:13 PM CDT  Subject: Prescription Question    Hello    I will be out of town next week and I will run out of my gabupantin medication, that is why  I requested a refill early

## 2021-08-27 ENCOUNTER — TELEPHONE (OUTPATIENT)
Dept: NEUROLOGY | Facility: CLINIC | Age: 66
End: 2021-08-27

## 2021-08-27 ENCOUNTER — OFFICE VISIT (OUTPATIENT)
Dept: PHYSICAL THERAPY | Facility: HOSPITAL | Age: 66
End: 2021-08-27
Attending: PHYSICAL MEDICINE & REHABILITATION
Payer: COMMERCIAL

## 2021-08-27 ENCOUNTER — TELEPHONE (OUTPATIENT)
Dept: INTERNAL MEDICINE CLINIC | Facility: CLINIC | Age: 66
End: 2021-08-27

## 2021-08-27 ENCOUNTER — OFFICE VISIT (OUTPATIENT)
Dept: NEUROLOGY | Facility: CLINIC | Age: 66
End: 2021-08-27

## 2021-08-27 VITALS
OXYGEN SATURATION: 100 % | SYSTOLIC BLOOD PRESSURE: 108 MMHG | WEIGHT: 127 LBS | HEART RATE: 80 BPM | DIASTOLIC BLOOD PRESSURE: 62 MMHG | BODY MASS INDEX: 23.37 KG/M2 | HEIGHT: 62 IN

## 2021-08-27 DIAGNOSIS — M81.0 AGE-RELATED OSTEOPOROSIS WITHOUT CURRENT PATHOLOGICAL FRACTURE: ICD-10-CM

## 2021-08-27 DIAGNOSIS — M48.062 SPINAL STENOSIS, LUMBAR REGION, WITH NEUROGENIC CLAUDICATION: ICD-10-CM

## 2021-08-27 DIAGNOSIS — M48.062 SPINAL STENOSIS OF LUMBAR REGION WITH NEUROGENIC CLAUDICATION: Primary | ICD-10-CM

## 2021-08-27 DIAGNOSIS — Z85.3 HISTORY OF BREAST CANCER: ICD-10-CM

## 2021-08-27 PROCEDURE — 97140 MANUAL THERAPY 1/> REGIONS: CPT | Performed by: PHYSICAL THERAPIST

## 2021-08-27 PROCEDURE — 3078F DIAST BP <80 MM HG: CPT | Performed by: PHYSICAL MEDICINE & REHABILITATION

## 2021-08-27 PROCEDURE — 99214 OFFICE O/P EST MOD 30 MIN: CPT | Performed by: PHYSICAL MEDICINE & REHABILITATION

## 2021-08-27 PROCEDURE — 97110 THERAPEUTIC EXERCISES: CPT | Performed by: PHYSICAL THERAPIST

## 2021-08-27 PROCEDURE — 3074F SYST BP LT 130 MM HG: CPT | Performed by: PHYSICAL MEDICINE & REHABILITATION

## 2021-08-27 PROCEDURE — 3008F BODY MASS INDEX DOCD: CPT | Performed by: PHYSICAL MEDICINE & REHABILITATION

## 2021-08-27 RX ORDER — GABAPENTIN 300 MG/1
300 CAPSULE ORAL 3 TIMES DAILY
Qty: 90 CAPSULE | Refills: 0 | Status: SHIPPED | OUTPATIENT
Start: 2021-08-27 | End: 2021-09-25

## 2021-08-27 NOTE — PROGRESS NOTES
[address]  Progress Note    CHIEF COMPLAINT:  Patient presents with:  Back Pain: Patient f/u on R TFESI (8/5/21) with 40-50% presently. C/o of R low back pain that radiates down to leg with N/T.   Takes Tyl use: No      Drug use: No      Sexual activity: Not on file      CURRENT MEDICATIONS:   Current Outpatient Medications   Medication Sig Dispense Refill   • gabapentin 300 MG Oral Cap Take 1 capsule (300 mg total) by mouth 3 (three) times daily.  90 capsule admits  Tingling/Numbness: admits            All other systems reviewed and are negative. Pertinent positives and negatives noted in the HPI.         PHYSICAL EXAM:   /62   Pulse 80   Ht 62\"   Wt 127 lb (57.6 kg)   LMP 10/03/1995 (Approximate)   SpO2 AVS summary. The patient was in agreement with the assessment and plan. All questions were answered. There were no barriers to learning.         Lynda Fajardo MD  Physical Medicine and Rehabilitation/Sports Medicine  MEDICAL CENTER [facility]

## 2021-08-27 NOTE — PROGRESS NOTES
8/27/2021  Dx:      Spinal stenosis of lumbar region with neurogenic claudication     Authorized # of Visits:  8 visits on HOM          Next MD visit: none   Fall Risk: standard         Precautions:  general  Medication Changes since last visit?: No    Sub 8/23/2021  5. The pt will be able to walk 1 mile without an increase in symptoms. NOT MET 8/23/2021  6. The pt will be able to get OOB without an increase in pain.   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 Past medical history was reviewed with Ambar Spencer.  Significant findings include  Past Medical History:   Diagnosis Date   • Breast cancer ([facility] [address]     • Chicken pox     • Headache     • Testing: RFIl - increased?worse, REIL - decreased/no change, side glides in standing - decreased/wores        STRENGTH:   5/5 MMT Scale   Left  Right Comments   Hip Flexion (L2) 5    4    Knee Extension (L3) 5 5    Knee Flexion 5 5    Ankle DF (L4) 5 4

## 2021-08-27 NOTE — TELEPHONE ENCOUNTER
Per [facility] Guidelines effective 3/2021-In Network+Out of Network Orders must come from the PCP    Dr. Thelbert Apgar placed order for the Orthopedic Surgeon Dr. Oziel Villagomez at Dr. Sarah Parkinson office and informed her of the above.  Daniella will await comple

## 2021-08-27 NOTE — TELEPHONE ENCOUNTER
Alan Trivedi from Dr. Gomez Lindsay office called requesting a referral for Dr. Rebecca Lundborg. Dr. Adrianne Hernandez is referring patient out to Dr. Rebecca Lundborg, and they attempted to place the referral but was closed out as they were told the referral needed to come from the PCP.     Marquis Garg

## 2021-08-30 ENCOUNTER — TELEPHONE (OUTPATIENT)
Dept: NEUROLOGY | Facility: CLINIC | Age: 66
End: 2021-08-30

## 2021-08-30 NOTE — TELEPHONE ENCOUNTER
Submitted request to [facility] KAYLEE for L5-S1 interlaminar epidural injection CPT [id number] to be done at [facility]    Status: pending [facility] clinical review

## 2021-08-31 NOTE — TELEPHONE ENCOUNTER
Received in basket from [email or web address]   advising of approval for L5-S1 interlaminar epidural injection for one unit/DOS. Will inform Nursing.

## 2021-09-01 NOTE — TELEPHONE ENCOUNTER
Patient has been scheduled for  L5-S1 interlaminar epidural injection on 9/23/21 at the [facility] with Dr. Evans Hong.   -Anesthesia type: Local.  -If receiving MAC or IVC sedation patient will need to get COVID tested 3 days prior even if already vaccinated (order

## 2021-09-02 ENCOUNTER — OFFICE VISIT (OUTPATIENT)
Dept: PHYSICAL THERAPY | Facility: HOSPITAL | Age: 66
End: 2021-09-02
Attending: PHYSICAL MEDICINE & REHABILITATION
Payer: COMMERCIAL

## 2021-09-02 DIAGNOSIS — M48.062 SPINAL STENOSIS, LUMBAR REGION, WITH NEUROGENIC CLAUDICATION: ICD-10-CM

## 2021-09-02 PROCEDURE — 97140 MANUAL THERAPY 1/> REGIONS: CPT | Performed by: PHYSICAL THERAPIST

## 2021-09-02 NOTE — PROGRESS NOTES
9/2/2021  Dx:      Spinal stenosis of lumbar region with neurogenic claudication     Authorized # of Visits:  8 visits on [facility]          Next MD visit: none   Fall Risk: standard         Precautions:  general  Medication Changes since last visit?: No    Subj Therapy; Therapeutic Exercises; Neuromuscular Re-education; Therapeutic Activity; Patient education: Home exercise program instruction; TNE Education; Modalities as needed.     Certification From: 4/40/9848  To:11/14/2021     Charges: Man3     Total Timed Osteopenia     • PONV (postoperative nausea and vomiting)     • PPD positive           SURGICAL HISTORY:        Past Surgical History:   Procedure Laterality Date   • CHOLECYSTECTOMY         lap cholecystectomy   • COLONOSCOPY N/A 12/4/2017     Procedure: Extension NT NT      Flexibility:      R L   Hamstrings 55 degrees with SLR 55 degrees with SLR   Piriformis short WFL   Hip Flexor short WFL       Neuro Screen: toe walking and heel walking (+) for weakness R  Special Tests: (+) SLR (+) Slump    Outcome S

## 2021-09-07 ENCOUNTER — OFFICE VISIT (OUTPATIENT)
Dept: PHYSICAL THERAPY | Facility: HOSPITAL | Age: 66
End: 2021-09-07
Attending: PHYSICAL MEDICINE & REHABILITATION
Payer: COMMERCIAL

## 2021-09-07 DIAGNOSIS — M48.062 SPINAL STENOSIS, LUMBAR REGION, WITH NEUROGENIC CLAUDICATION: ICD-10-CM

## 2021-09-07 PROCEDURE — 97110 THERAPEUTIC EXERCISES: CPT | Performed by: PHYSICAL THERAPIST

## 2021-09-07 NOTE — PROGRESS NOTES
9/2/2021  Dx:      Spinal stenosis of lumbar region with neurogenic claudication     Authorized # of Visits:  8 visits on [facility]          Next MD visit: none   Fall Risk: standard         Precautions:  general  Medication Changes since last visit?: No    Subj get OOB without an increase in pain. NOT MET 8/23/2021     Frequency / Duration: Patient will be seen for 1-2 x/week or a total of 18 visits over a 90 day period. Treatment will include: Manual Therapy; Therapeutic Exercises;  Neuromuscular Re-education; findings include  Past Medical History:   Diagnosis Date   • Breast cancer ([facility] [address]     • Chicken pox     • Headache     • High blood pressure     • High cholesterol     • Meniere disease     • Osteopenia     • PONV (postoperative nausea and vomiting)     • P Scale   Left  Right Comments   Hip Flexion (L2) 5    4    Knee Extension (L3) 5 5    Knee Flexion 5 5    Ankle DF (L4) 5 4    EHL (L5) 5 4    Ankle PF (S1) 5 4    Hip Abduction NT NT    Hip Extension NT NT      Flexibility:      R L   Hamstrings 55 degrees

## 2021-09-10 ENCOUNTER — APPOINTMENT (OUTPATIENT)
Dept: PHYSICAL THERAPY | Facility: HOSPITAL | Age: 66
End: 2021-09-10
Attending: PHYSICAL MEDICINE & REHABILITATION
Payer: COMMERCIAL

## 2021-09-14 ENCOUNTER — APPOINTMENT (OUTPATIENT)
Dept: PHYSICAL THERAPY | Facility: HOSPITAL | Age: 66
End: 2021-09-14
Attending: PHYSICAL MEDICINE & REHABILITATION
Payer: COMMERCIAL

## 2021-09-16 ENCOUNTER — APPOINTMENT (OUTPATIENT)
Dept: PHYSICAL THERAPY | Facility: HOSPITAL | Age: 66
End: 2021-09-16
Attending: PHYSICAL MEDICINE & REHABILITATION
Payer: COMMERCIAL

## 2021-09-21 NOTE — TELEPHONE ENCOUNTER
Dr. Komal Cardenas is OON with insurance. Routing this message to Dr. Raelyn Cockayne clinical [place] to determine if they'd like to refer the patient to someone else. Referral Notes  Number of Notes: 8    .   Type Date User Summary Attachment   General 09/21/2021

## 2021-09-23 ENCOUNTER — OFFICE VISIT (OUTPATIENT)
Dept: SURGERY | Facility: CLINIC | Age: 66
End: 2021-09-23

## 2021-09-23 DIAGNOSIS — M54.16 LUMBAR RADICULOPATHY: Primary | ICD-10-CM

## 2021-09-23 PROCEDURE — 62323 NJX INTERLAMINAR LMBR/SAC: CPT | Performed by: PHYSICAL MEDICINE & REHABILITATION

## 2021-09-23 NOTE — PROCEDURES
Preoperative Diagnosis:  (M54.16) Lumbar radiculopathy  (primary encounter diagnosis)       Postoperative Diagnosis:  (M54.16) Lumbar radiculopathy  (primary encounter diagnosis)       Procedures:   L5-S1 interlaminar epidural steroid injection under fluor

## 2021-09-27 RX ORDER — GABAPENTIN 300 MG/1
300 CAPSULE ORAL 3 TIMES DAILY
Qty: 90 CAPSULE | Refills: 0 | Status: SHIPPED | OUTPATIENT
Start: 2021-09-27 | End: 2021-11-01

## 2021-09-27 NOTE — TELEPHONE ENCOUNTER
Medication request: gabapentin 300 MG Oral Cap   Sig:   Take 1 capsule (300 mg total) by mouth 3 (three) times daily.       LOV: 9/25/21  NOV: 10/20/21    [place]: 8/27/21 qty#90 r-0

## 2021-10-01 ENCOUNTER — OFFICE VISIT (OUTPATIENT)
Dept: PHYSICAL THERAPY | Facility: HOSPITAL | Age: 66
End: 2021-10-01
Attending: PHYSICAL MEDICINE & REHABILITATION
Payer: COMMERCIAL

## 2021-10-01 PROCEDURE — 97140 MANUAL THERAPY 1/> REGIONS: CPT | Performed by: PHYSICAL THERAPIST

## 2021-10-01 PROCEDURE — 97110 THERAPEUTIC EXERCISES: CPT | Performed by: PHYSICAL THERAPIST

## 2021-10-01 NOTE — PROGRESS NOTES
10/1/2021  Dx:      Spinal stenosis of lumbar region with neurogenic claudication     Authorized # of Visits:  8 visits on HOM          Next MD visit: none   Fall Risk: standard         Precautions:  general  Medication Changes since last visit?: No    Sub questions were answered.      1. The pt will be independent in their HEP. MET 8/23/2021  2. Centralization of symptoms to the lumbar spine. Partially met 10/1/2021  3. The pt will be independent in a modified workout program.  MET 10/1/2021  4.   The p activities, the patient is a retired nurse. Pt goals include decrease pain, get her \"life back\". Past medical history was reviewed with Mabel Anaya.  Significant findings include  Past Medical History:   Diagnosis Date   • Breast cancer ([facility] [address]     • Chic Sideglide WFL      Repeated Motion Testing: RFIl - increased?worse, REIL - decreased/no change, side glides in standing - decreased/wores        STRENGTH:   5/5 MMT Scale   Left  Right Comments   Hip Flexion (L2) 5    4    Knee Extension (L3) 5 5    Knee F

## 2021-10-04 RX ORDER — HYDROCHLOROTHIAZIDE 25 MG/1
TABLET ORAL
Qty: 90 TABLET | Refills: 1 | Status: SHIPPED | OUTPATIENT
Start: 2021-10-04 | End: 2022-03-31

## 2021-10-04 RX ORDER — ATORVASTATIN CALCIUM 20 MG/1
TABLET, FILM COATED ORAL
Qty: 90 TABLET | Refills: 0 | Status: SHIPPED | OUTPATIENT
Start: 2021-10-04

## 2021-10-08 ENCOUNTER — APPOINTMENT (OUTPATIENT)
Dept: PHYSICAL THERAPY | Facility: HOSPITAL | Age: 66
End: 2021-10-08
Attending: PHYSICAL MEDICINE & REHABILITATION
Payer: COMMERCIAL

## 2021-10-15 ENCOUNTER — APPOINTMENT (OUTPATIENT)
Dept: PHYSICAL THERAPY | Facility: HOSPITAL | Age: 66
End: 2021-10-15
Attending: PHYSICAL MEDICINE & REHABILITATION
Payer: COMMERCIAL

## 2021-10-22 ENCOUNTER — APPOINTMENT (OUTPATIENT)
Dept: PHYSICAL THERAPY | Facility: HOSPITAL | Age: 66
End: 2021-10-22
Attending: PHYSICAL MEDICINE & REHABILITATION
Payer: COMMERCIAL

## 2021-10-29 ENCOUNTER — PATIENT MESSAGE (OUTPATIENT)
Dept: PHYSICAL MEDICINE AND REHAB | Facility: CLINIC | Age: 66
End: 2021-10-29

## 2021-11-01 RX ORDER — GABAPENTIN 300 MG/1
300 CAPSULE ORAL 3 TIMES DAILY
Qty: 90 CAPSULE | Refills: 0 | Status: SHIPPED | OUTPATIENT
Start: 2021-11-01 | End: 2021-11-10

## 2021-11-01 NOTE — TELEPHONE ENCOUNTER
From: Phu Handing  To: Wilian Julian MD  Sent: 10/29/2021 8:13 PM CDT  Subject: Gabapentin 300 mgs tid    Hello, I need to get a refill for gabapentin , it is  not on my refill list, so I am not able to request it with my chart.    Thank you

## 2021-11-01 NOTE — TELEPHONE ENCOUNTER
Medication request: gabapentin 300 MG Oral Cap   Sig:   Take 1 capsule (300 mg total) by mouth 3 (three) times daily.   Qty#90R-0    LOV: 9/23/21  NOV: 11/10/21    [place] ref[place]: 9/27/21

## 2021-11-09 ENCOUNTER — OFFICE VISIT (OUTPATIENT)
Dept: INTERNAL MEDICINE CLINIC | Facility: CLINIC | Age: 66
End: 2021-11-09

## 2021-11-09 VITALS
WEIGHT: 135 LBS | DIASTOLIC BLOOD PRESSURE: 80 MMHG | HEIGHT: 62 IN | OXYGEN SATURATION: 99 % | SYSTOLIC BLOOD PRESSURE: 125 MMHG | HEART RATE: 68 BPM | BODY MASS INDEX: 24.84 KG/M2

## 2021-11-09 DIAGNOSIS — I10 ESSENTIAL HYPERTENSION: ICD-10-CM

## 2021-11-09 DIAGNOSIS — M54.16 LUMBAR RADICULOPATHY: ICD-10-CM

## 2021-11-09 DIAGNOSIS — Z23 NEEDS FLU SHOT: ICD-10-CM

## 2021-11-09 DIAGNOSIS — Z17.0 MALIGNANT NEOPLASM OF UPPER-OUTER QUADRANT OF RIGHT BREAST IN FEMALE, ESTROGEN RECEPTOR POSITIVE (HCC): ICD-10-CM

## 2021-11-09 DIAGNOSIS — M48.062 SPINAL STENOSIS OF LUMBAR REGION WITH NEUROGENIC CLAUDICATION: ICD-10-CM

## 2021-11-09 DIAGNOSIS — R73.9 HYPERGLYCEMIA: ICD-10-CM

## 2021-11-09 DIAGNOSIS — R79.89 ELEVATED LFTS: ICD-10-CM

## 2021-11-09 DIAGNOSIS — Z00.00 GENERAL MEDICAL EXAM: Primary | ICD-10-CM

## 2021-11-09 DIAGNOSIS — E78.2 MIXED HYPERLIPIDEMIA: ICD-10-CM

## 2021-11-09 DIAGNOSIS — M85.852 OSTEOPENIA OF NECK OF LEFT FEMUR: ICD-10-CM

## 2021-11-09 DIAGNOSIS — Z23 NEED FOR DIPHTHERIA-TETANUS-PERTUSSIS (TDAP) VACCINE: ICD-10-CM

## 2021-11-09 DIAGNOSIS — C50.411 MALIGNANT NEOPLASM OF UPPER-OUTER QUADRANT OF RIGHT BREAST IN FEMALE, ESTROGEN RECEPTOR POSITIVE (HCC): ICD-10-CM

## 2021-11-09 PROCEDURE — 90662 IIV NO PRSV INCREASED AG IM: CPT | Performed by: INTERNAL MEDICINE

## 2021-11-09 PROCEDURE — 3079F DIAST BP 80-89 MM HG: CPT | Performed by: INTERNAL MEDICINE

## 2021-11-09 PROCEDURE — 99397 PER PM REEVAL EST PAT 65+ YR: CPT | Performed by: INTERNAL MEDICINE

## 2021-11-09 PROCEDURE — 90715 TDAP VACCINE 7 YRS/> IM: CPT | Performed by: INTERNAL MEDICINE

## 2021-11-09 PROCEDURE — 3074F SYST BP LT 130 MM HG: CPT | Performed by: INTERNAL MEDICINE

## 2021-11-09 PROCEDURE — 90471 IMMUNIZATION ADMIN: CPT | Performed by: INTERNAL MEDICINE

## 2021-11-09 PROCEDURE — 90472 IMMUNIZATION ADMIN EACH ADD: CPT | Performed by: INTERNAL MEDICINE

## 2021-11-09 PROCEDURE — 3008F BODY MASS INDEX DOCD: CPT | Performed by: INTERNAL MEDICINE

## 2021-11-09 NOTE — PROGRESS NOTES
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     Patient has hypertension, hypothyroidism, lumbar radiculopathy, right breast CA stage Ia (pT1c, PN0 (SN), CMO, G3, ER positive, SC positiv tablet 3   • Risedronate Sodium 150 MG Oral Tab Take 1 tablet (150 mg total) by mouth every 30 (thirty) days. 3 tablet 3   • cholecalciferol (VITAMIN D3) 125 MCG (5000 UT) Oral Cap Take 5,000 Units by mouth daily.      • Calcium Carbonate Antacid 600 MG Ora Mother    • Breast Cancer Sister 68   • Prostate Cancer Brother 62   • Breast Cancer Self 61      Social History    Tobacco Use      Smoking status: Never Smoker      Smokeless tobacco: Never Used    Alcohol use: No    Drug use: No           REVIEW OF SYST Repeat right diagnostic mammogram scheduled. Continue self breast exam     Health maintenance, will check fasting Lipids, CMP, TSH and CBC. Family history of DM, check hemoglobin A1c . Vitamin D deficiency, check vitamin D 25-hydroxy.     Colonoscop

## 2021-11-10 ENCOUNTER — OFFICE VISIT (OUTPATIENT)
Dept: PHYSICAL MEDICINE AND REHAB | Facility: CLINIC | Age: 66
End: 2021-11-10

## 2021-11-10 VITALS
BODY MASS INDEX: 24.84 KG/M2 | OXYGEN SATURATION: 99 % | WEIGHT: 135 LBS | SYSTOLIC BLOOD PRESSURE: 124 MMHG | HEIGHT: 62 IN | DIASTOLIC BLOOD PRESSURE: 80 MMHG | HEART RATE: 87 BPM

## 2021-11-10 DIAGNOSIS — Z78.0 OSTEOPENIA AFTER MENOPAUSE: ICD-10-CM

## 2021-11-10 DIAGNOSIS — M48.062 SPINAL STENOSIS OF LUMBAR REGION WITH NEUROGENIC CLAUDICATION: Primary | ICD-10-CM

## 2021-11-10 DIAGNOSIS — M85.80 OSTEOPENIA AFTER MENOPAUSE: ICD-10-CM

## 2021-11-10 DIAGNOSIS — Z85.3 HISTORY OF BREAST CANCER: ICD-10-CM

## 2021-11-10 PROCEDURE — 3008F BODY MASS INDEX DOCD: CPT | Performed by: PHYSICAL MEDICINE & REHABILITATION

## 2021-11-10 PROCEDURE — 3079F DIAST BP 80-89 MM HG: CPT | Performed by: PHYSICAL MEDICINE & REHABILITATION

## 2021-11-10 PROCEDURE — 99213 OFFICE O/P EST LOW 20 MIN: CPT | Performed by: PHYSICAL MEDICINE & REHABILITATION

## 2021-11-10 PROCEDURE — 3074F SYST BP LT 130 MM HG: CPT | Performed by: PHYSICAL MEDICINE & REHABILITATION

## 2021-11-10 RX ORDER — GABAPENTIN 300 MG/1
300 CAPSULE ORAL 3 TIMES DAILY
Qty: 270 CAPSULE | Refills: 3 | Status: SHIPPED | OUTPATIENT
Start: 2021-11-10 | End: 2022-11-05

## 2021-11-10 NOTE — PROGRESS NOTES
[address]  Progress Note    CHIEF COMPLAINT:  Patient presents with:  Back Pain: LOV 09/23/21 Pt comes in for f/u on back injection, states 70% improvement. States pain is nerve pain.  States treadmill is re • hydroCHLOROthiazide 25 MG Oral Tab TAKE 1 TABLET BY MOUTH EVERY DAY AS NEEDED 90 tablet 1   • ATORVASTATIN 20 MG Oral Tab TAKE 1 TABLET(20 MG) BY MOUTH EVERY DAY 90 tablet 0   • letrozole 2.5 MG Oral Tab Take 1 tablet (2.5 mg total) by mouth daily.  80 Body mass index is 24.69 kg/m².       General: No immediate distress  Extremities: No lower extremity edema bilaterally   Spine: full and painfree lumbar ROM in all directions  Hips: full and painfree ROM   Neuro:   Cognition: alert & oriented x 3, atte

## 2021-11-13 ENCOUNTER — LAB ENCOUNTER (OUTPATIENT)
Dept: LAB | Facility: HOSPITAL | Age: 66
End: 2021-11-13
Attending: INTERNAL MEDICINE
Payer: COMMERCIAL

## 2021-11-13 DIAGNOSIS — E78.2 MIXED HYPERLIPIDEMIA: ICD-10-CM

## 2021-11-13 DIAGNOSIS — I10 ESSENTIAL HYPERTENSION: ICD-10-CM

## 2021-11-13 DIAGNOSIS — R73.9 HYPERGLYCEMIA: ICD-10-CM

## 2021-11-13 DIAGNOSIS — Z00.00 GENERAL MEDICAL EXAM: ICD-10-CM

## 2021-11-13 PROCEDURE — 82306 VITAMIN D 25 HYDROXY: CPT

## 2021-11-13 PROCEDURE — 36415 COLL VENOUS BLD VENIPUNCTURE: CPT

## 2021-11-13 PROCEDURE — 83036 HEMOGLOBIN GLYCOSYLATED A1C: CPT

## 2021-11-13 PROCEDURE — 84443 ASSAY THYROID STIM HORMONE: CPT

## 2021-11-13 PROCEDURE — 80061 LIPID PANEL: CPT

## 2021-11-13 PROCEDURE — 80053 COMPREHEN METABOLIC PANEL: CPT

## 2021-11-13 PROCEDURE — 85025 COMPLETE CBC W/AUTO DIFF WBC: CPT

## 2021-12-13 ENCOUNTER — PATIENT MESSAGE (OUTPATIENT)
Dept: PHYSICAL MEDICINE AND REHAB | Facility: CLINIC | Age: 66
End: 2021-12-13

## 2021-12-13 RX ORDER — GABAPENTIN 300 MG/1
CAPSULE ORAL
Qty: 90 CAPSULE | Refills: 0 | OUTPATIENT
Start: 2021-12-13

## 2021-12-13 NOTE — TELEPHONE ENCOUNTER
Medication request: gabapentin 300 MG Oral Cap  Take 1 capsule (300 mg total) by mouth 3 (three) times daily    #270-R-0    LOV 08/27/21  NOV None    [place]: 11/01/21

## 2021-12-14 NOTE — TELEPHONE ENCOUNTER
From: Anna Nicole  To: Georgia Rojas MD  Sent: 12/13/2021 7:08 PM CST  Subject: Refill for gabapentin     [facility] ,   When I saw you on November 10, you said you were giving me a 1 year prescription for   gabapentin.  When I tried to ge

## 2021-12-14 NOTE — TELEPHONE ENCOUNTER
Spoke to [facility], they are able to refill medication gabapentin now. Refills are available. See other refill request from 12/13/21. Spoke to , annaa verified.  Advised  I have spoken to [facility] and they will have Rx ready for pick

## 2021-12-14 NOTE — TELEPHONE ENCOUNTER
According to epic, I gave her 1 year of gabapentin refills to last until November 5, 2022. Denied. Please call the pharmacy to make sure that they clearly understand this.   Thank you

## 2022-01-17 ENCOUNTER — OFFICE VISIT (OUTPATIENT)
Dept: HEMATOLOGY/ONCOLOGY | Facility: HOSPITAL | Age: 67
End: 2022-01-17
Attending: INTERNAL MEDICINE
Payer: COMMERCIAL

## 2022-01-17 ENCOUNTER — HOSPITAL ENCOUNTER (OUTPATIENT)
Dept: MAMMOGRAPHY | Facility: HOSPITAL | Age: 67
Discharge: HOME | End: 2022-01-17
Attending: INTERNAL MEDICINE
Payer: COMMERCIAL

## 2022-01-17 VITALS
HEIGHT: 62 IN | DIASTOLIC BLOOD PRESSURE: 75 MMHG | TEMPERATURE: 95 F | WEIGHT: 137.19 LBS | HEART RATE: 77 BPM | OXYGEN SATURATION: 98 % | BODY MASS INDEX: 25.25 KG/M2 | SYSTOLIC BLOOD PRESSURE: 133 MMHG | RESPIRATION RATE: 16 BRPM

## 2022-01-17 DIAGNOSIS — Z79.811 ENCOUNTER FOR MONITORING AROMATASE INHIBITOR THERAPY: ICD-10-CM

## 2022-01-17 DIAGNOSIS — Z08 ENCOUNTER FOR FOLLOW-UP SURVEILLANCE OF BREAST CANCER: ICD-10-CM

## 2022-01-17 DIAGNOSIS — Z78.0 OSTEOPENIA AFTER MENOPAUSE: ICD-10-CM

## 2022-01-17 DIAGNOSIS — Z17.0 MALIGNANT NEOPLASM OF UPPER-OUTER QUADRANT OF RIGHT BREAST IN FEMALE, ESTROGEN RECEPTOR POSITIVE (HCC): Primary | ICD-10-CM

## 2022-01-17 DIAGNOSIS — M85.80 OSTEOPENIA AFTER MENOPAUSE: ICD-10-CM

## 2022-01-17 DIAGNOSIS — Z85.3 ENCOUNTER FOR FOLLOW-UP SURVEILLANCE OF BREAST CANCER: ICD-10-CM

## 2022-01-17 DIAGNOSIS — C50.411 MALIGNANT NEOPLASM OF UPPER-OUTER QUADRANT OF RIGHT BREAST IN FEMALE, ESTROGEN RECEPTOR POSITIVE (HCC): Primary | ICD-10-CM

## 2022-01-17 DIAGNOSIS — Z51.81 ENCOUNTER FOR MONITORING AROMATASE INHIBITOR THERAPY: ICD-10-CM

## 2022-01-17 DIAGNOSIS — C50.411 MALIGNANT NEOPLASM OF UPPER-OUTER QUADRANT OF RIGHT BREAST IN FEMALE, ESTROGEN RECEPTOR POSITIVE (HCC): ICD-10-CM

## 2022-01-17 DIAGNOSIS — Z17.0 MALIGNANT NEOPLASM OF UPPER-OUTER QUADRANT OF RIGHT BREAST IN FEMALE, ESTROGEN RECEPTOR POSITIVE (HCC): ICD-10-CM

## 2022-01-17 PROCEDURE — 77065 DX MAMMO INCL CAD UNI: CPT | Performed by: INTERNAL MEDICINE

## 2022-01-17 PROCEDURE — 77061 BREAST TOMOSYNTHESIS UNI: CPT | Performed by: INTERNAL MEDICINE

## 2022-01-17 PROCEDURE — 99214 OFFICE O/P EST MOD 30 MIN: CPT | Performed by: INTERNAL MEDICINE

## 2022-01-17 NOTE — PROGRESS NOTES
BERTRAM     Lulu Keys is a 77year old female who is here today for follow-up of diagnosis of Malignant neoplasm of upper-outer quadrant of right breast in female, estrogen receptor positive (hcc)  (primary encounter diagnosis)  Encounter for mo tablet 3   • cholecalciferol (VITAMIN D3) 125 MCG (5000 UT) Oral Cap Take 5,000 Units by mouth daily. • Calcium Carbonate Antacid 600 MG Oral Chew Tab Chew 1,200 mg by mouth.      • Flaxseed, Linseed, (FLAX SEED OIL) 1000 MG Oral Cap Take by mouth daily cancer) Father 80        d.84; non-smoker   • Other (gastric cancer) Brother 61        fmr smoker   • Cancer Paternal Aunt         cervical ca   • Stroke Mother    • Breast Cancer Sister 68   • Prostate Cancer Brother 62   • Breast Cancer Self 63         P cM0, G3, ER+, NJ+, HER2-, Oncotype DX score: 18) - Signed by Evan Novak MD on 10/30/2019      Min Donato is a 77year old female with ER/NJ positive breast cancer, node negative.     Patient status post lumpectomy, and status post radiation CATEGORY:     DIAGNOSTIC CATEGORY 3--PROBABLY BENIGN FINDING. RECOMMENDATIONS:   SHORT TERM FOLLOW-UP DIAGNOSTIC MAMMOGRAM BILATERAL BREASTS IN 6 MONTHS. PLEASE NOTE: NORMAL MAMMOGRAM DOES NOT EXCLUDE THE POSSIBILITY OF BREAST CANCER.   A

## 2022-03-31 RX ORDER — HYDROCHLOROTHIAZIDE 25 MG/1
TABLET ORAL
Qty: 90 TABLET | Refills: 0 | Status: SHIPPED | OUTPATIENT
Start: 2022-03-31 | End: 2022-07-21

## 2022-04-28 RX ORDER — ATORVASTATIN CALCIUM 20 MG/1
20 TABLET, FILM COATED ORAL DAILY
Qty: 90 TABLET | Refills: 0 | OUTPATIENT
Start: 2022-04-28

## 2022-04-28 RX ORDER — ATORVASTATIN CALCIUM 20 MG/1
20 TABLET, FILM COATED ORAL DAILY
Qty: 90 TABLET | Refills: 0 | Status: SHIPPED | OUTPATIENT
Start: 2022-04-28

## 2022-07-18 ENCOUNTER — APPOINTMENT (OUTPATIENT)
Dept: HEMATOLOGY/ONCOLOGY | Facility: HOSPITAL | Age: 67
End: 2022-07-18
Attending: INTERNAL MEDICINE
Payer: MEDICARE

## 2022-07-18 ENCOUNTER — HOSPITAL ENCOUNTER (OUTPATIENT)
Dept: MAMMOGRAPHY | Facility: HOSPITAL | Age: 67
Discharge: HOME | End: 2022-07-18
Attending: INTERNAL MEDICINE
Payer: MEDICARE

## 2022-07-18 DIAGNOSIS — Z17.0 MALIGNANT NEOPLASM OF UPPER-OUTER QUADRANT OF RIGHT BREAST IN FEMALE, ESTROGEN RECEPTOR POSITIVE (HCC): ICD-10-CM

## 2022-07-18 DIAGNOSIS — C50.411 MALIGNANT NEOPLASM OF UPPER-OUTER QUADRANT OF RIGHT BREAST IN FEMALE, ESTROGEN RECEPTOR POSITIVE (HCC): ICD-10-CM

## 2022-07-18 PROCEDURE — 77066 DX MAMMO INCL CAD BI: CPT | Performed by: INTERNAL MEDICINE

## 2022-07-18 PROCEDURE — 77062 BREAST TOMOSYNTHESIS BI: CPT | Performed by: INTERNAL MEDICINE

## 2022-07-21 ENCOUNTER — TELEPHONE (OUTPATIENT)
Dept: INTERNAL MEDICINE CLINIC | Facility: CLINIC | Age: 67
End: 2022-07-21

## 2022-07-21 RX ORDER — COVID-19 ANTIGEN TEST
KIT MISCELLANEOUS
COMMUNITY
Start: 2022-07-12

## 2022-07-21 RX ORDER — HYDROCHLOROTHIAZIDE 25 MG/1
TABLET ORAL
Qty: 90 TABLET | Refills: 0 | Status: SHIPPED | OUTPATIENT
Start: 2022-07-21

## 2022-07-21 RX ORDER — ATORVASTATIN CALCIUM 20 MG/1
20 TABLET, FILM COATED ORAL DAILY
Qty: 90 TABLET | Refills: 0 | Status: SHIPPED | OUTPATIENT
Start: 2022-07-21 | End: 2022-07-21

## 2022-07-21 RX ORDER — ATORVASTATIN CALCIUM 20 MG/1
20 TABLET, FILM COATED ORAL DAILY
Qty: 90 TABLET | Refills: 0 | Status: SHIPPED | OUTPATIENT
Start: 2022-07-21

## 2022-07-21 NOTE — TELEPHONE ENCOUNTER
Pt  Elyssa Gruberrigosylvia states wife just found out Dr. Miriam Lerner has retired. Elyssa Singleton also stated wife has decided to us Dr. Arsh Lacy as PCP, however their first appt is on 8/24/2022.  Elyssa Singleton is asking if pt can get a refill for Atorvastatin 20 MG in the meantime until they are able to see Dr. Arsh Lacy?

## 2022-07-21 NOTE — TELEPHONE ENCOUNTER
Pt is calling requesting refill on  atorvastatin and hydrochlorothiazide. Pt states she was not notified pcp had retired.       Please call and advise

## 2022-07-25 RX ORDER — LETROZOLE 2.5 MG/1
2.5 TABLET, FILM COATED ORAL DAILY
Qty: 90 TABLET | Refills: 3 | Status: SHIPPED | OUTPATIENT
Start: 2022-07-25 | End: 2023-07-15

## 2022-08-02 ENCOUNTER — APPOINTMENT (OUTPATIENT)
Dept: HEMATOLOGY/ONCOLOGY | Facility: HOSPITAL | Age: 67
End: 2022-08-02
Attending: INTERNAL MEDICINE
Payer: MEDICARE

## 2022-08-04 ENCOUNTER — TELEPHONE (OUTPATIENT)
Dept: INTERNAL MEDICINE CLINIC | Facility: CLINIC | Age: 67
End: 2022-08-04

## 2022-08-04 RX ORDER — RISEDRONATE SODIUM 150 MG/1
150 TABLET, FILM COATED ORAL
Qty: 3 TABLET | Refills: 3 | Status: SHIPPED | OUTPATIENT
Start: 2022-08-04

## 2022-08-04 NOTE — TELEPHONE ENCOUNTER
Patient's , [facility], is calling to request the following medication refill for patient. Patient has a new patient appt to establish care with Dr. Wojciech Christianson on 8/24/22. Patient tried calling her retired doctor's office but they were unable to assist them, Dr. Monika Garcias.   Patient is aware that establish care has not been confirmed. Patient is requesting a call back for advise.     Risedronate

## 2022-08-16 ENCOUNTER — OFFICE VISIT (OUTPATIENT)
Dept: HEMATOLOGY/ONCOLOGY | Facility: HOSPITAL | Age: 67
End: 2022-08-16
Attending: INTERNAL MEDICINE
Payer: MEDICARE

## 2022-08-16 VITALS
OXYGEN SATURATION: 99 % | WEIGHT: 137 LBS | TEMPERATURE: 99 F | RESPIRATION RATE: 16 BRPM | HEART RATE: 77 BPM | HEIGHT: 62 IN | SYSTOLIC BLOOD PRESSURE: 140 MMHG | DIASTOLIC BLOOD PRESSURE: 73 MMHG | BODY MASS INDEX: 25.21 KG/M2

## 2022-08-16 DIAGNOSIS — T45.1X5A AROMATASE INHIBITOR-ASSOCIATED ARTHRALGIA: ICD-10-CM

## 2022-08-16 DIAGNOSIS — Z78.0 OSTEOPENIA AFTER MENOPAUSE: ICD-10-CM

## 2022-08-16 DIAGNOSIS — Z79.811 ENCOUNTER FOR MONITORING AROMATASE INHIBITOR THERAPY: ICD-10-CM

## 2022-08-16 DIAGNOSIS — C50.411 MALIGNANT NEOPLASM OF UPPER-OUTER QUADRANT OF RIGHT BREAST IN FEMALE, ESTROGEN RECEPTOR POSITIVE (HCC): Primary | ICD-10-CM

## 2022-08-16 DIAGNOSIS — Z51.81 ENCOUNTER FOR MONITORING AROMATASE INHIBITOR THERAPY: ICD-10-CM

## 2022-08-16 DIAGNOSIS — Z17.0 MALIGNANT NEOPLASM OF UPPER-OUTER QUADRANT OF RIGHT BREAST IN FEMALE, ESTROGEN RECEPTOR POSITIVE (HCC): Primary | ICD-10-CM

## 2022-08-16 DIAGNOSIS — Z85.3 ENCOUNTER FOR FOLLOW-UP SURVEILLANCE OF BREAST CANCER: ICD-10-CM

## 2022-08-16 DIAGNOSIS — M85.80 OSTEOPENIA AFTER MENOPAUSE: ICD-10-CM

## 2022-08-16 DIAGNOSIS — Z08 ENCOUNTER FOR FOLLOW-UP SURVEILLANCE OF BREAST CANCER: ICD-10-CM

## 2022-08-16 DIAGNOSIS — M25.50 AROMATASE INHIBITOR-ASSOCIATED ARTHRALGIA: ICD-10-CM

## 2022-08-16 PROCEDURE — 99214 OFFICE O/P EST MOD 30 MIN: CPT | Performed by: INTERNAL MEDICINE

## 2022-08-17 ENCOUNTER — TELEPHONE (OUTPATIENT)
Dept: GASTROENTEROLOGY | Facility: CLINIC | Age: 67
End: 2022-08-17

## 2022-08-17 DIAGNOSIS — R19.4 ALTERED BOWEL HABITS: ICD-10-CM

## 2022-08-17 DIAGNOSIS — Z86.010 PERSONAL HISTORY OF COLONIC POLYPS: Primary | ICD-10-CM

## 2022-08-17 NOTE — PROGRESS NOTES
Will do. GI RNs: Please reach out to the patient and schedule a colonoscopy for an alteration in bowel habits and a history of a colon polyp following a split dose MiraLAX/Gatorade preparation and monitored anesthesia care.

## 2022-08-17 NOTE — TELEPHONE ENCOUNTER
Author: Cortes Moyer MD Service: Sita Osorio Type: Physician   Filed: 8/17/2022 12:28 PM Encounter Date: 8/16/2022 Status: Signed   : Cortes Moyer MD (Physician)            Show:Clear all  [x]Manual[]Template[]Copied    Added by:  [x]Juan Pablo Martin MD      []Hover for details    Will do. GI RNs: Please reach out to the patient and schedule a colonoscopy for an alteration in bowel habits and a history of a colon polyp following a split dose MiraLAX/Gatorade preparation and monitored anesthesia care.

## 2022-08-24 ENCOUNTER — OFFICE VISIT (OUTPATIENT)
Dept: INTERNAL MEDICINE CLINIC | Facility: CLINIC | Age: 67
End: 2022-08-24
Payer: MEDICARE

## 2022-08-24 VITALS
HEART RATE: 73 BPM | BODY MASS INDEX: 25.36 KG/M2 | DIASTOLIC BLOOD PRESSURE: 68 MMHG | HEIGHT: 62 IN | SYSTOLIC BLOOD PRESSURE: 131 MMHG | WEIGHT: 137.81 LBS

## 2022-08-24 DIAGNOSIS — E78.2 MIXED HYPERLIPIDEMIA: ICD-10-CM

## 2022-08-24 DIAGNOSIS — K13.0 LIP LESION: ICD-10-CM

## 2022-08-24 DIAGNOSIS — Z01.84 IMMUNITY TO HEPATITIS B VIRUS DEMONSTRATED BY SEROLOGIC TEST: ICD-10-CM

## 2022-08-24 DIAGNOSIS — N39.46 MIXED STRESS AND URGE URINARY INCONTINENCE: ICD-10-CM

## 2022-08-24 DIAGNOSIS — M81.0 AGE-RELATED OSTEOPOROSIS WITHOUT CURRENT PATHOLOGICAL FRACTURE: Primary | ICD-10-CM

## 2022-08-24 DIAGNOSIS — R73.03 BORDERLINE DIABETES: ICD-10-CM

## 2022-08-24 DIAGNOSIS — M25.50 MULTIPLE JOINT PAIN: ICD-10-CM

## 2022-08-24 DIAGNOSIS — Z12.11 SCREENING FOR COLON CANCER: ICD-10-CM

## 2022-08-24 DIAGNOSIS — I10 ESSENTIAL HYPERTENSION: ICD-10-CM

## 2022-08-24 DIAGNOSIS — Z01.419 VISIT FOR PELVIC EXAM: ICD-10-CM

## 2022-08-24 DIAGNOSIS — E55.9 VITAMIN D DEFICIENCY: ICD-10-CM

## 2022-08-24 DIAGNOSIS — Z80.3 FAMILY HISTORY OF BREAST CANCER IN FIRST DEGREE RELATIVE: ICD-10-CM

## 2022-08-24 PROBLEM — E03.9 ACQUIRED HYPOTHYROIDISM: Status: RESOLVED | Noted: 2021-04-27 | Resolved: 2022-08-24

## 2022-08-24 PROCEDURE — 99204 OFFICE O/P NEW MOD 45 MIN: CPT | Performed by: INTERNAL MEDICINE

## 2022-08-24 RX ORDER — TOLTERODINE TARTRATE 1 MG/1
1 TABLET, EXTENDED RELEASE ORAL 2 TIMES DAILY
Qty: 180 TABLET | Refills: 0 | Status: SHIPPED | OUTPATIENT
Start: 2022-08-24

## 2022-08-26 NOTE — TELEPHONE ENCOUNTER
Scheduled for:  Colonoscopy - [address]  Provider Name:  Dr. Prema Monique  Date:  12/15/22  Location:  [facility]  Sedation:  MAC  Time:  7:30 am (pt is aware to arrive at 6:30 am)  Prep:  Miralax/Gatorade, Prep instructions were given to pt over the phone, pt verbalized understanding. Meds/Allergies Reconciled?:  Yes, Physician reviewed      Diagnosis with codes:  Hx of colon polyps - Z86.010, Altered bowel habits - R19.4  Was patient informed to call insurance with codes (Y/N):  Yes, I confirmed [facility] insurance with this patient. Referral sent?:  Yes, Referral was sent at the time of electronic surgical scheduling. [facility] or [address] notified?:  Yes, I sent an electronic request to Endo Scheduling and received a confirmation today. Medication Orders:  N/A  Misc Orders:  Patient was informed that they will need a COVID 19 test prior to their procedure. Patient verbally understood & will await a phone call from [facility] to schedule. Further instructions given by staff:  I discussed the prep instructions with the patient which she verbally understood and is aware that I will send the instructions via [employer].

## 2022-09-12 ENCOUNTER — LAB ENCOUNTER (OUTPATIENT)
Dept: LAB | Facility: HOSPITAL | Age: 67
End: 2022-09-12
Attending: INTERNAL MEDICINE
Payer: MEDICARE

## 2022-09-12 DIAGNOSIS — E55.9 VITAMIN D DEFICIENCY: ICD-10-CM

## 2022-09-12 DIAGNOSIS — E78.2 MIXED HYPERLIPIDEMIA: ICD-10-CM

## 2022-09-12 DIAGNOSIS — M25.50 MULTIPLE JOINT PAIN: ICD-10-CM

## 2022-09-12 DIAGNOSIS — Z01.84 IMMUNITY TO HEPATITIS B VIRUS DEMONSTRATED BY SEROLOGIC TEST: ICD-10-CM

## 2022-09-12 DIAGNOSIS — R73.03 BORDERLINE DIABETES: ICD-10-CM

## 2022-09-12 DIAGNOSIS — I10 ESSENTIAL HYPERTENSION: ICD-10-CM

## 2022-09-12 LAB
ALBUMIN SERPL-MCNC: 4.1 G/DL (ref 3.4–5)
ALBUMIN/GLOB SERPL: 1.2 {RATIO} (ref 1–2)
ALP LIVER SERPL-CCNC: 71 U/L
ALT SERPL-CCNC: 34 U/L
ANION GAP SERPL CALC-SCNC: 8 MMOL/L (ref 0–18)
AST SERPL-CCNC: 22 U/L (ref 15–37)
BASOPHILS # BLD AUTO: 0.07 X10(3) UL (ref 0–0.2)
BASOPHILS NFR BLD AUTO: 1.1 %
BILIRUB SERPL-MCNC: 0.8 MG/DL (ref 0.1–2)
BUN BLD-MCNC: 19 MG/DL (ref 7–18)
BUN/CREAT SERPL: 24.1 (ref 10–20)
CALCIUM BLD-MCNC: 9.8 MG/DL (ref 8.5–10.1)
CHLORIDE SERPL-SCNC: 108 MMOL/L (ref 98–112)
CHOLEST SERPL-MCNC: 153 MG/DL (ref ?–200)
CO2 SERPL-SCNC: 27 MMOL/L (ref 21–32)
CREAT BLD-MCNC: 0.79 MG/DL
CRP SERPL-MCNC: <0.29 MG/DL (ref ?–0.3)
DEPRECATED RDW RBC AUTO: 46.1 FL (ref 35.1–46.3)
EOSINOPHIL # BLD AUTO: 0.23 X10(3) UL (ref 0–0.7)
EOSINOPHIL NFR BLD AUTO: 3.7 %
ERYTHROCYTE [DISTWIDTH] IN BLOOD BY AUTOMATED COUNT: 13.1 % (ref 11–15)
ERYTHROCYTE [SEDIMENTATION RATE] IN BLOOD: 17 MM/HR
EST. AVERAGE GLUCOSE BLD GHB EST-MCNC: 123 MG/DL (ref 68–126)
FASTING PATIENT LIPID ANSWER: YES
FASTING STATUS PATIENT QL REPORTED: YES
GFR SERPLBLD BASED ON 1.73 SQ M-ARVRAT: 82 ML/MIN/1.73M2 (ref 60–?)
GLOBULIN PLAS-MCNC: 3.5 G/DL (ref 2.8–4.4)
GLUCOSE BLD-MCNC: 93 MG/DL (ref 70–99)
HBA1C MFR BLD: 5.9 % (ref ?–5.7)
HBV SURFACE AB SER QL: REACTIVE
HBV SURFACE AB SERPL IA-ACNC: 264.62 MIU/ML
HBV SURFACE AG SER-ACNC: 0.31 [IU]/L
HBV SURFACE AG SERPL QL IA: NONREACTIVE
HCT VFR BLD AUTO: 45.1 %
HCV AB SERPL QL IA: NONREACTIVE
HDLC SERPL-MCNC: 69 MG/DL (ref 40–59)
HGB BLD-MCNC: 14 G/DL
IMM GRANULOCYTES # BLD AUTO: 0.02 X10(3) UL (ref 0–1)
IMM GRANULOCYTES NFR BLD: 0.3 %
LDLC SERPL CALC-MCNC: 68 MG/DL (ref ?–100)
LYMPHOCYTES # BLD AUTO: 3.07 X10(3) UL (ref 1–4)
LYMPHOCYTES NFR BLD AUTO: 49.7 %
MCH RBC QN AUTO: 29.7 PG (ref 26–34)
MCHC RBC AUTO-ENTMCNC: 31 G/DL (ref 31–37)
MCV RBC AUTO: 95.6 FL
MONOCYTES # BLD AUTO: 0.46 X10(3) UL (ref 0.1–1)
MONOCYTES NFR BLD AUTO: 7.4 %
NEUTROPHILS # BLD AUTO: 2.33 X10 (3) UL (ref 1.5–7.7)
NEUTROPHILS # BLD AUTO: 2.33 X10(3) UL (ref 1.5–7.7)
NEUTROPHILS NFR BLD AUTO: 37.8 %
NONHDLC SERPL-MCNC: 84 MG/DL (ref ?–130)
OSMOLALITY SERPL CALC.SUM OF ELEC: 298 MOSM/KG (ref 275–295)
PLATELET # BLD AUTO: 246 10(3)UL (ref 150–450)
POTASSIUM SERPL-SCNC: 3.8 MMOL/L (ref 3.5–5.1)
PROT SERPL-MCNC: 7.6 G/DL (ref 6.4–8.2)
RBC # BLD AUTO: 4.72 X10(6)UL
RHEUMATOID FACT SERPL-ACNC: <10 IU/ML (ref ?–15)
SODIUM SERPL-SCNC: 143 MMOL/L (ref 136–145)
TRIGL SERPL-MCNC: 84 MG/DL (ref 30–149)
TSI SER-ACNC: 1.54 MIU/ML (ref 0.36–3.74)
VIT D+METAB SERPL-MCNC: 51.1 NG/ML (ref 30–100)
VLDLC SERPL CALC-MCNC: 13 MG/DL (ref 0–30)
WBC # BLD AUTO: 6.2 X10(3) UL (ref 4–11)

## 2022-09-12 PROCEDURE — 80061 LIPID PANEL: CPT

## 2022-09-12 PROCEDURE — 80053 COMPREHEN METABOLIC PANEL: CPT

## 2022-09-12 PROCEDURE — 86140 C-REACTIVE PROTEIN: CPT

## 2022-09-12 PROCEDURE — 85652 RBC SED RATE AUTOMATED: CPT

## 2022-09-12 PROCEDURE — 86706 HEP B SURFACE ANTIBODY: CPT

## 2022-09-12 PROCEDURE — 84443 ASSAY THYROID STIM HORMONE: CPT

## 2022-09-12 PROCEDURE — 86803 HEPATITIS C AB TEST: CPT

## 2022-09-12 PROCEDURE — 82306 VITAMIN D 25 HYDROXY: CPT

## 2022-09-12 PROCEDURE — 87340 HEPATITIS B SURFACE AG IA: CPT

## 2022-09-12 PROCEDURE — 86038 ANTINUCLEAR ANTIBODIES: CPT

## 2022-09-12 PROCEDURE — 86039 ANTINUCLEAR ANTIBODIES (ANA): CPT

## 2022-09-12 PROCEDURE — 86431 RHEUMATOID FACTOR QUANT: CPT

## 2022-09-12 PROCEDURE — 36415 COLL VENOUS BLD VENIPUNCTURE: CPT

## 2022-09-12 PROCEDURE — 85025 COMPLETE CBC W/AUTO DIFF WBC: CPT

## 2022-09-12 PROCEDURE — 86200 CCP ANTIBODY: CPT

## 2022-09-12 PROCEDURE — 83036 HEMOGLOBIN GLYCOSYLATED A1C: CPT

## 2022-09-13 LAB — CCP IGG SERPL-ACNC: 1 U/ML (ref 0–6.9)

## 2022-09-15 LAB — NUCLEAR IGG TITR SER IF: POSITIVE {TITER}

## 2022-09-16 LAB — ANA NUCLEOLAR TITR SER IF: 160 {TITER}

## 2022-09-26 ENCOUNTER — HOSPITAL ENCOUNTER (OUTPATIENT)
Dept: GENERAL RADIOLOGY | Facility: HOSPITAL | Age: 67
Discharge: HOME | End: 2022-09-26
Attending: INTERNAL MEDICINE

## 2022-09-26 ENCOUNTER — OFFICE VISIT (OUTPATIENT)
Dept: RHEUMATOLOGY | Facility: CLINIC | Age: 67
End: 2022-09-26

## 2022-09-26 ENCOUNTER — LAB ENCOUNTER (OUTPATIENT)
Dept: LAB | Facility: HOSPITAL | Age: 67
End: 2022-09-26
Attending: INTERNAL MEDICINE

## 2022-09-26 VITALS
DIASTOLIC BLOOD PRESSURE: 80 MMHG | RESPIRATION RATE: 16 BRPM | BODY MASS INDEX: 24.66 KG/M2 | HEIGHT: 62 IN | WEIGHT: 134 LBS | SYSTOLIC BLOOD PRESSURE: 122 MMHG | HEART RATE: 67 BPM

## 2022-09-26 DIAGNOSIS — M54.2 NECK PAIN: ICD-10-CM

## 2022-09-26 DIAGNOSIS — M79.641 BILATERAL HAND PAIN: ICD-10-CM

## 2022-09-26 DIAGNOSIS — R76.8 POSITIVE ANA (ANTINUCLEAR ANTIBODY): ICD-10-CM

## 2022-09-26 DIAGNOSIS — M79.642 BILATERAL HAND PAIN: ICD-10-CM

## 2022-09-26 DIAGNOSIS — R76.8 POSITIVE ANA (ANTINUCLEAR ANTIBODY): Primary | ICD-10-CM

## 2022-09-26 PROCEDURE — 86225 DNA ANTIBODY NATIVE: CPT

## 2022-09-26 PROCEDURE — 86235 NUCLEAR ANTIGEN ANTIBODY: CPT

## 2022-09-26 PROCEDURE — 36415 COLL VENOUS BLD VENIPUNCTURE: CPT

## 2022-09-26 PROCEDURE — 73130 X-RAY EXAM OF HAND: CPT | Performed by: INTERNAL MEDICINE

## 2022-09-26 PROCEDURE — 72050 X-RAY EXAM NECK SPINE 4/5VWS: CPT | Performed by: INTERNAL MEDICINE

## 2022-09-26 PROCEDURE — 99205 OFFICE O/P NEW HI 60 MIN: CPT | Performed by: INTERNAL MEDICINE

## 2022-09-26 RX ORDER — MELOXICAM 7.5 MG/1
7.5 TABLET ORAL DAILY
Qty: 60 TABLET | Refills: 0 | Status: SHIPPED | OUTPATIENT
Start: 2022-09-26

## 2022-09-26 NOTE — PATIENT INSTRUCTIONS
You were seen today for joint pain, hand pain and a positive LUDIN  Your symptoms are consistent with osteoarthritis, less likely rheumatoid arthritis  Please get some more blood work and x-rays of the hands today    Regarding her osteopenia, stop the risedronate  Plan to get you approved for Prolia  Once approved the nurses will call you to schedule appointment for the injection.   The Prolia will be every 6 months

## 2022-09-27 ENCOUNTER — TELEPHONE (OUTPATIENT)
Dept: RHEUMATOLOGY | Facility: CLINIC | Age: 67
End: 2022-09-27

## 2022-09-27 LAB — DSDNA AB TITR SER: <10 {TITER}

## 2022-09-28 ENCOUNTER — OFFICE VISIT (OUTPATIENT)
Dept: INTERNAL MEDICINE CLINIC | Facility: CLINIC | Age: 67
End: 2022-09-28

## 2022-09-28 VITALS
RESPIRATION RATE: 16 BRPM | HEART RATE: 60 BPM | WEIGHT: 137 LBS | HEIGHT: 62 IN | SYSTOLIC BLOOD PRESSURE: 130 MMHG | DIASTOLIC BLOOD PRESSURE: 73 MMHG | BODY MASS INDEX: 25.21 KG/M2

## 2022-09-28 DIAGNOSIS — M81.0 AGE-RELATED OSTEOPOROSIS WITHOUT CURRENT PATHOLOGICAL FRACTURE: ICD-10-CM

## 2022-09-28 DIAGNOSIS — N39.46 MIXED STRESS AND URGE URINARY INCONTINENCE: ICD-10-CM

## 2022-09-28 DIAGNOSIS — R73.03 BORDERLINE DIABETES: ICD-10-CM

## 2022-09-28 DIAGNOSIS — I10 ESSENTIAL HYPERTENSION: Primary | ICD-10-CM

## 2022-09-28 DIAGNOSIS — E78.2 MIXED HYPERLIPIDEMIA: ICD-10-CM

## 2022-09-28 LAB
ENA SM IGG SER QL: NEGATIVE
ENA SM+RNP AB SER QL: NEGATIVE
ENA SS-A AB SER QL IA: NEGATIVE
ENA SS-B AB SER QL IA: NEGATIVE

## 2022-09-28 PROCEDURE — 99214 OFFICE O/P EST MOD 30 MIN: CPT | Performed by: INTERNAL MEDICINE

## 2022-10-05 NOTE — TELEPHONE ENCOUNTER
Benefits investigation: benefits subject to $233 deductible (met) and 20% co-insurance for the administration and cost of Prolia. No PA required. Okay to schedule patient for injection? Component      Latest Ref Rng & Units 9/12/2022   Glucose      70 - 99 mg/dL 93   Sodium      136 - 145 mmol/L 143   Potassium      3.5 - 5.1 mmol/L 3.8   Chloride      98 - 112 mmol/L 108   Carbon Dioxide, Total      21.0 - 32.0 mmol/L 27.0   ANION GAP      0 - 18 mmol/L 8   BUN      7 - 18 mg/dL 19 (H)   CREATININE      0.55 - 1.02 mg/dL 0.79   BUN/CREATININE RATIO      10.0 - 20.0 24.1 (H)   CALCIUM      8.5 - 10.1 mg/dL 9.8   CALCULATED OSMOLALITY      275 - 295 mOsm/kg 298 (H)   eGFR-Cr      >=60 mL/min/1.73m2 82   ALT (SGPT)      13 - 56 U/L 34   AST (SGOT)      15 - 37 U/L 22   ALKALINE PHOSPHATASE      55 - 142 U/L 71   Total Bilirubin      0.1 - 2.0 mg/dL 0.8   PROTEIN, TOTAL      6.4 - 8.2 g/dL 7.6   Albumin      3.4 - 5.0 g/dL 4.1   Globulin      2.8 - 4.4 g/dL 3.5   A/G Ratio      1.0 - 2.0 1.2   Patient Fasting for CMP?        Yes

## 2022-10-06 NOTE — TELEPHONE ENCOUNTER
Pt has an [facility] insurance. Patient given appointment 10/12.       Future Appointments   Date Time Provider Jose Luis Toro   10/12/2022  9:00 AM [facility] RN RHEUMATOLOGY [facility]   11/10/2022  9:00 AM Yomi Wilson MD [facility]   12/15/2022  7:30 AM STATHOPOULOS, PROCEDURE ECWMOGIPROC None   12/30/2022  9:20 AM Linnea Diaz MD ECC[facility]OBGYN [address]   2/20/2023  1:30 PM Veronica Valentine  [facility] HEM ONC EMO

## 2022-10-12 ENCOUNTER — NURSE ONLY (OUTPATIENT)
Dept: RHEUMATOLOGY | Facility: CLINIC | Age: 67
End: 2022-10-12
Payer: MEDICARE

## 2022-10-12 DIAGNOSIS — M81.0 AGE-RELATED OSTEOPOROSIS WITHOUT CURRENT PATHOLOGICAL FRACTURE: Primary | ICD-10-CM

## 2022-10-12 PROCEDURE — 96372 THER/PROPH/DIAG INJ SC/IM: CPT | Performed by: INTERNAL MEDICINE

## 2022-10-12 NOTE — PROGRESS NOTES
Patient came in for a nurse visit to receive Prolia injection. Patient verified name and  and was aware of receiving Prolia injection. Patient received Prolia on her Left Upper Arm. She tolerated well. She was informed to schedule next visit in 6 months.

## 2022-10-13 RX ORDER — ATORVASTATIN CALCIUM 20 MG/1
20 TABLET, FILM COATED ORAL DAILY
Qty: 90 TABLET | Refills: 0 | Status: SHIPPED | OUTPATIENT
Start: 2022-10-13

## 2022-11-10 ENCOUNTER — OFFICE VISIT (OUTPATIENT)
Dept: INTERNAL MEDICINE CLINIC | Facility: CLINIC | Age: 67
End: 2022-11-10
Payer: MEDICARE

## 2022-11-10 VITALS
DIASTOLIC BLOOD PRESSURE: 72 MMHG | RESPIRATION RATE: 16 BRPM | BODY MASS INDEX: 25.06 KG/M2 | WEIGHT: 136.19 LBS | HEART RATE: 68 BPM | HEIGHT: 62 IN | SYSTOLIC BLOOD PRESSURE: 118 MMHG

## 2022-11-10 DIAGNOSIS — M54.16 LUMBAR RADICULOPATHY: ICD-10-CM

## 2022-11-10 DIAGNOSIS — M54.12 BRACHIAL NEURITIS: ICD-10-CM

## 2022-11-10 DIAGNOSIS — Z00.00 ENCOUNTER FOR ANNUAL HEALTH EXAMINATION: Primary | ICD-10-CM

## 2022-11-10 DIAGNOSIS — Z80.3 FAMILY HISTORY OF BREAST CANCER IN FIRST DEGREE RELATIVE: ICD-10-CM

## 2022-11-10 DIAGNOSIS — Z17.0 MALIGNANT NEOPLASM OF UPPER-OUTER QUADRANT OF RIGHT BREAST IN FEMALE, ESTROGEN RECEPTOR POSITIVE (HCC): ICD-10-CM

## 2022-11-10 DIAGNOSIS — C50.411 MALIGNANT NEOPLASM OF UPPER-OUTER QUADRANT OF RIGHT BREAST IN FEMALE, ESTROGEN RECEPTOR POSITIVE (HCC): ICD-10-CM

## 2022-11-10 DIAGNOSIS — M81.0 AGE-RELATED OSTEOPOROSIS WITHOUT CURRENT PATHOLOGICAL FRACTURE: ICD-10-CM

## 2022-11-10 DIAGNOSIS — K13.0 LIP LESION: ICD-10-CM

## 2022-11-10 DIAGNOSIS — E55.9 VITAMIN D DEFICIENCY: ICD-10-CM

## 2022-11-10 DIAGNOSIS — I10 ESSENTIAL HYPERTENSION: ICD-10-CM

## 2022-11-10 DIAGNOSIS — D69.2 SENILE PURPURA (HCC): ICD-10-CM

## 2022-11-10 DIAGNOSIS — R73.03 BORDERLINE DIABETES: ICD-10-CM

## 2022-11-10 DIAGNOSIS — H93.19 SUBJECTIVE TINNITUS, UNSPECIFIED LATERALITY: ICD-10-CM

## 2022-11-10 DIAGNOSIS — J30.1 ALLERGIC RHINITIS DUE TO POLLEN, UNSPECIFIED SEASONALITY: ICD-10-CM

## 2022-11-10 DIAGNOSIS — N39.46 MIXED STRESS AND URGE URINARY INCONTINENCE: ICD-10-CM

## 2022-11-10 DIAGNOSIS — J34.3 HYPERTROPHY OF NASAL TURBINATES: ICD-10-CM

## 2022-11-10 DIAGNOSIS — M48.062 SPINAL STENOSIS OF LUMBAR REGION WITH NEUROGENIC CLAUDICATION: ICD-10-CM

## 2022-11-10 DIAGNOSIS — Z51.81 ENCOUNTER FOR MONITORING AROMATASE INHIBITOR THERAPY: ICD-10-CM

## 2022-11-10 DIAGNOSIS — R76.11 NONSPECIFIC REACTION TO TUBERCULIN SKIN TEST: ICD-10-CM

## 2022-11-10 DIAGNOSIS — Z79.811 ENCOUNTER FOR MONITORING AROMATASE INHIBITOR THERAPY: ICD-10-CM

## 2022-11-10 DIAGNOSIS — E78.2 MIXED HYPERLIPIDEMIA: ICD-10-CM

## 2022-11-10 DIAGNOSIS — M26.609 TMJ (TEMPOROMANDIBULAR JOINT SYNDROME): Chronic | ICD-10-CM

## 2022-11-10 PROBLEM — Z78.0 OSTEOPENIA AFTER MENOPAUSE: Status: RESOLVED | Noted: 2021-07-13 | Resolved: 2022-11-10

## 2022-11-10 PROBLEM — Z85.3 HISTORY OF BREAST CANCER: Status: RESOLVED | Noted: 2021-06-29 | Resolved: 2022-11-10

## 2022-11-10 PROBLEM — M25.50 MULTIPLE JOINT PAIN: Status: RESOLVED | Noted: 2020-07-08 | Resolved: 2022-11-10

## 2022-11-10 PROBLEM — M85.80 OSTEOPENIA AFTER MENOPAUSE: Status: RESOLVED | Noted: 2021-07-13 | Resolved: 2022-11-10

## 2022-11-10 PROCEDURE — 90662 IIV NO PRSV INCREASED AG IM: CPT | Performed by: INTERNAL MEDICINE

## 2022-11-10 PROCEDURE — G0402 INITIAL PREVENTIVE EXAM: HCPCS | Performed by: INTERNAL MEDICINE

## 2022-11-10 PROCEDURE — G0008 ADMIN INFLUENZA VIRUS VAC: HCPCS | Performed by: INTERNAL MEDICINE

## 2022-11-26 PROBLEM — D69.2 SENILE PURPURA (HCC): Status: ACTIVE | Noted: 2022-11-26

## 2022-11-26 PROBLEM — D69.2 SENILE PURPURA: Status: ACTIVE | Noted: 2022-11-26

## 2022-12-09 RX ORDER — SODIUM CHLORIDE, SODIUM LACTATE, POTASSIUM CHLORIDE, CALCIUM CHLORIDE 600; 310; 30; 20 MG/100ML; MG/100ML; MG/100ML; MG/100ML
INJECTION, SOLUTION INTRAVENOUS CONTINUOUS
OUTPATIENT
Start: 2022-12-09

## 2022-12-15 ENCOUNTER — HOSPITAL ENCOUNTER (OUTPATIENT)
Age: 67
Setting detail: HOSPITAL OUTPATIENT SURGERY
Discharge: HOME | End: 2022-12-15
Attending: INTERNAL MEDICINE | Admitting: INTERNAL MEDICINE
Payer: MEDICARE

## 2022-12-15 ENCOUNTER — ANESTHESIA EVENT (OUTPATIENT)
Dept: ENDOSCOPY | Age: 67
End: 2022-12-15
Payer: MEDICARE

## 2022-12-15 ENCOUNTER — ANESTHESIA (OUTPATIENT)
Dept: ENDOSCOPY | Age: 67
End: 2022-12-15
Payer: MEDICARE

## 2022-12-15 ENCOUNTER — TELEPHONE (OUTPATIENT)
Dept: GASTROENTEROLOGY | Facility: CLINIC | Age: 67
End: 2022-12-15

## 2022-12-15 VITALS
BODY MASS INDEX: 23.55 KG/M2 | DIASTOLIC BLOOD PRESSURE: 47 MMHG | OXYGEN SATURATION: 96 % | HEART RATE: 67 BPM | RESPIRATION RATE: 13 BRPM | WEIGHT: 128 LBS | HEIGHT: 62 IN | SYSTOLIC BLOOD PRESSURE: 89 MMHG

## 2022-12-15 DIAGNOSIS — Z86.010 PERSONAL HISTORY OF COLONIC POLYPS: ICD-10-CM

## 2022-12-15 DIAGNOSIS — R19.4 ALTERED BOWEL HABITS: ICD-10-CM

## 2022-12-15 PROCEDURE — G0105 COLORECTAL SCRN; HI RISK IND: HCPCS | Performed by: INTERNAL MEDICINE

## 2022-12-15 RX ORDER — LIDOCAINE HYDROCHLORIDE 10 MG/ML
INJECTION, SOLUTION EPIDURAL; INFILTRATION; INTRACAUDAL; PERINEURAL AS NEEDED
Status: DISCONTINUED | OUTPATIENT
Start: 2022-12-15 | End: 2022-12-15 | Stop reason: SURG

## 2022-12-15 RX ORDER — SODIUM CHLORIDE, SODIUM LACTATE, POTASSIUM CHLORIDE, CALCIUM CHLORIDE 600; 310; 30; 20 MG/100ML; MG/100ML; MG/100ML; MG/100ML
INJECTION, SOLUTION INTRAVENOUS CONTINUOUS
OUTPATIENT
Start: 2022-12-15

## 2022-12-15 RX ORDER — SODIUM CHLORIDE, SODIUM LACTATE, POTASSIUM CHLORIDE, CALCIUM CHLORIDE 600; 310; 30; 20 MG/100ML; MG/100ML; MG/100ML; MG/100ML
INJECTION, SOLUTION INTRAVENOUS CONTINUOUS PRN
Status: DISCONTINUED | OUTPATIENT
Start: 2022-12-15 | End: 2022-12-15 | Stop reason: SURG

## 2022-12-15 RX ORDER — NALOXONE HYDROCHLORIDE 0.4 MG/ML
80 INJECTION, SOLUTION INTRAMUSCULAR; INTRAVENOUS; SUBCUTANEOUS AS NEEDED
OUTPATIENT
Start: 2022-12-15 | End: 2022-12-15

## 2022-12-15 RX ADMIN — LIDOCAINE HYDROCHLORIDE 50 MG: 10 INJECTION, SOLUTION EPIDURAL; INFILTRATION; INTRACAUDAL; PERINEURAL at 07:37:00

## 2022-12-15 RX ADMIN — SODIUM CHLORIDE, SODIUM LACTATE, POTASSIUM CHLORIDE, CALCIUM CHLORIDE: 600; 310; 30; 20 INJECTION, SOLUTION INTRAVENOUS at 08:05:00

## 2022-12-15 RX ADMIN — SODIUM CHLORIDE, SODIUM LACTATE, POTASSIUM CHLORIDE, CALCIUM CHLORIDE: 600; 310; 30; 20 INJECTION, SOLUTION INTRAVENOUS at 07:46:00

## 2022-12-15 RX ADMIN — SODIUM CHLORIDE, SODIUM LACTATE, POTASSIUM CHLORIDE, CALCIUM CHLORIDE: 600; 310; 30; 20 INJECTION, SOLUTION INTRAVENOUS at 07:35:00

## 2022-12-15 RX ADMIN — SODIUM CHLORIDE, SODIUM LACTATE, POTASSIUM CHLORIDE, CALCIUM CHLORIDE: 600; 310; 30; 20 INJECTION, SOLUTION INTRAVENOUS at 07:55:00

## 2022-12-15 NOTE — ANESTHESIA POSTPROCEDURE EVALUATION
Patient: Fede Martinez    Procedure Summary     Date: 12/15/22 Room / Location: [facility] ENDOSCOPY 02 / [facility] ENDO    Anesthesia Start: 8882 Anesthesia Stop: 2006    Procedure: COLONOSCOPY Diagnosis:       Personal history of colonic polyps      Altered bowel habits      (normal)    Surgeons: Katerina Randolph MD Anesthesiologist:     Anesthesia Type: MAC ASA Status: 2          Anesthesia Type: MAC    Vitals Value Taken Time   /55 12/15/22 0806   Temp  12/15/22 0809   Pulse 71 12/15/22 0808   Resp 18 12/15/22 0808   SpO2 100 % 12/15/22 0808   Vitals shown include unvalidated device data.     [address] AN Post Evaluation:   Patient Evaluated in PACU  Patient Participation: complete - patient participated  Level of Consciousness: awake  Pain Management: adequate  Airway Patency:patent  Dental exam unchanged from preop  Yes    Cardiovascular Status: acceptable  Respiratory Status: acceptable  Postoperative Hydration acceptable      GABI HERNADEZ DO  12/15/2022 8:09 AM

## 2022-12-15 NOTE — DISCHARGE INSTRUCTIONS
Home Care Instructions for Colonoscopy with Sedation    Diet:  - Resume your regular diet as tolerated unless otherwise instructed. - Start with light meals to minimize bloating.  - Do not drink alcohol today. Medication:  - If you have questions about resuming your normal medications, please contact your Primary Care Physician. Activities:  - Take it easy today. Do not return to work today. - Do not drive today. - Do not operate any machinery today (including kitchen equipment). Colonoscopy:  - You may notice some rectal \"spotting\" (a little blood on the toilet tissue) for a day or two after the exam. This is normal.  - If you experience any rectal bleeding (not spotting), persistent tenderness or sharp severe abdominal pains, oral temperature over 100 degrees Fahrenheit, light-headedness or dizziness, or any other problems, contact your doctor. **If unable to reach your doctor, please go to the [facility] Emergency Room**    - Your referring physician will receive a full report of your examination.  - If you do not hear from your doctor's office within two weeks of your biopsy, please call them for your results. You may be able to see your laboratory results in [facility] between 4 and 7 business days. In some cases, your physician may not have viewed the results before they are released to [address] If you have questions regarding your results contact the physician who ordered the test/exam by phone or via [address] by choosing \"Ask a Medical Question. \"

## 2022-12-15 NOTE — OPERATIVE REPORT
[address] Endoscopy Report      Date of Procedure:  12/15/22      Preoperative Diagnosis:  1. Colorectal cancer screening  2. Personal history of adenomatous colon polyp      Postoperative Diagnosis:  Normal colonoscopy      Procedure:    Colonoscopy       Surgeon:  Erasmo Calhoun M.D. Anesthesia:  Monitored anesthesia care  Cecal withdrawal time: 16 minutes  EBL:  Insignificant      Brief History: This is a 79year old female who presents for a screening/surveillance colonoscopy in the setting of a history of a solitary subcentimeter tubular adenoma removed from the colon 5 years prior. The patient has had no new lower gastrointestinal tract symptoms or signs. Technique:  After informed consent, the patient was placed in the left lateral recumbent position. Digital rectal examination revealed no palpable intraluminal abnormalities. An Olympus variable stiffness 190 series HD colonoscope was inserted into the rectum and advanced under direct vision by following the lumen to the terminal ileum. The colon was examined upon withdrawal in the left lateral recumbent position. Findings:  The preparation of the colon was very good. The terminal ileum was examined for 5 cm and visually normal.  The ileocecal valve was well preserved. The visualized colonic mucosa from the cecum to the anal verge was normal with an intact vascular pattern. There were no colonic polyps, definite diverticula, mass lesions, vascular anomalies or signs of inflammation seen. Retroflexion in the rectum revealed no abnormalities. The procedure was well tolerated without immediate complication. Impression:  Normal colonoscopy to the terminal ileum    Recommendations: In the absence of any new symptoms or signs repeat colonoscopy in 10 years per patient preference, functional status and comorbidities.         Erasmo Calhoun MD  12/15/2022

## 2022-12-15 NOTE — TELEPHONE ENCOUNTER
Health Maintenance updated. 10 year colonoscopy recall entered into patient outreach in [address] [place] [address] Rd.   Next colonoscopy will be due 12/15/2032

## 2022-12-21 RX ORDER — GABAPENTIN 300 MG/1
CAPSULE ORAL
Qty: 270 CAPSULE | Refills: 3 | Status: SHIPPED | OUTPATIENT
Start: 2022-12-21

## 2022-12-21 NOTE — TELEPHONE ENCOUNTER
Refill Request    Medication request: gabapentin 300 MG Oral Cap. Take 1 capsule (300 mg total) by mouth 3 (three) times daily. LOV: 11/10/2021 with Karen Beltrán M.D. Due back to clinic per last office note:  Per Dr. Chester Riggins: [facility] as needed. \"  NOV: Visit date not found - Sent patient [employer] Message to make [place]up appointment. [place]: 09/13/2022 #270 - 0 refills remain. Urine drug screen (if applicable): n/a  Pain contract: none    LOV plan (if weaning or changing medications): Per Dr. Chester Riggins: \" We discussed increasing gabapentin but she wished to stay on 300 3 times daily. I gave her 1 year of refills. \"

## 2022-12-27 RX ORDER — HYDROCHLOROTHIAZIDE 25 MG/1
TABLET ORAL
Qty: 90 TABLET | Refills: 0 | Status: SHIPPED | OUTPATIENT
Start: 2022-12-27

## 2022-12-30 ENCOUNTER — OFFICE VISIT (OUTPATIENT)
Dept: OBGYN CLINIC | Facility: CLINIC | Age: 67
End: 2022-12-30
Payer: MEDICARE

## 2022-12-30 VITALS
BODY MASS INDEX: 24.73 KG/M2 | DIASTOLIC BLOOD PRESSURE: 87 MMHG | HEIGHT: 62 IN | HEART RATE: 82 BPM | WEIGHT: 134.38 LBS | SYSTOLIC BLOOD PRESSURE: 148 MMHG

## 2022-12-30 DIAGNOSIS — N32.81 OAB (OVERACTIVE BLADDER): ICD-10-CM

## 2022-12-30 DIAGNOSIS — Z01.419 ENCOUNTER FOR GYNECOLOGICAL EXAMINATION WITHOUT ABNORMAL FINDING: ICD-10-CM

## 2022-12-30 DIAGNOSIS — Z12.4 SCREENING FOR MALIGNANT NEOPLASM OF CERVIX: Primary | ICD-10-CM

## 2022-12-30 DIAGNOSIS — Z85.3 HISTORY OF BREAST CANCER: ICD-10-CM

## 2022-12-30 DIAGNOSIS — R39.12 WEAK URINE STREAM: ICD-10-CM

## 2022-12-30 PROCEDURE — G0101 CA SCREEN;PELVIC/BREAST EXAM: HCPCS | Performed by: OBSTETRICS & GYNECOLOGY

## 2022-12-30 PROCEDURE — 99212 OFFICE O/P EST SF 10 MIN: CPT | Performed by: OBSTETRICS & GYNECOLOGY

## 2023-01-02 LAB — HPV I/H RISK 1 DNA SPEC QL NAA+PROBE: NEGATIVE

## 2023-02-02 RX ORDER — ATORVASTATIN CALCIUM 20 MG/1
20 TABLET, FILM COATED ORAL DAILY
Qty: 90 TABLET | Refills: 0 | OUTPATIENT
Start: 2023-02-02

## 2023-02-20 ENCOUNTER — OFFICE VISIT (OUTPATIENT)
Dept: HEMATOLOGY/ONCOLOGY | Facility: HOSPITAL | Age: 68
End: 2023-02-20
Attending: INTERNAL MEDICINE
Payer: MEDICARE

## 2023-02-20 VITALS
WEIGHT: 135.38 LBS | HEIGHT: 62 IN | HEART RATE: 82 BPM | BODY MASS INDEX: 24.91 KG/M2 | SYSTOLIC BLOOD PRESSURE: 145 MMHG | RESPIRATION RATE: 16 BRPM | DIASTOLIC BLOOD PRESSURE: 73 MMHG | OXYGEN SATURATION: 99 % | TEMPERATURE: 98 F

## 2023-02-20 DIAGNOSIS — M25.50 AROMATASE INHIBITOR-ASSOCIATED ARTHRALGIA: ICD-10-CM

## 2023-02-20 DIAGNOSIS — R35.0 URINE FREQUENCY: ICD-10-CM

## 2023-02-20 DIAGNOSIS — Z17.0 MALIGNANT NEOPLASM OF UPPER-OUTER QUADRANT OF RIGHT BREAST IN FEMALE, ESTROGEN RECEPTOR POSITIVE (HCC): Primary | ICD-10-CM

## 2023-02-20 DIAGNOSIS — Z51.81 ENCOUNTER FOR MONITORING AROMATASE INHIBITOR THERAPY: ICD-10-CM

## 2023-02-20 DIAGNOSIS — Z79.811 ENCOUNTER FOR MONITORING AROMATASE INHIBITOR THERAPY: ICD-10-CM

## 2023-02-20 DIAGNOSIS — Z08 ENCOUNTER FOR FOLLOW-UP SURVEILLANCE OF BREAST CANCER: ICD-10-CM

## 2023-02-20 DIAGNOSIS — C50.411 MALIGNANT NEOPLASM OF UPPER-OUTER QUADRANT OF RIGHT BREAST IN FEMALE, ESTROGEN RECEPTOR POSITIVE (HCC): Primary | ICD-10-CM

## 2023-02-20 DIAGNOSIS — M85.80 OSTEOPENIA AFTER MENOPAUSE: ICD-10-CM

## 2023-02-20 DIAGNOSIS — Z78.0 OSTEOPENIA AFTER MENOPAUSE: ICD-10-CM

## 2023-02-20 DIAGNOSIS — Z12.31 SCREENING MAMMOGRAM, ENCOUNTER FOR: ICD-10-CM

## 2023-02-20 DIAGNOSIS — Z85.3 ENCOUNTER FOR FOLLOW-UP SURVEILLANCE OF BREAST CANCER: ICD-10-CM

## 2023-02-20 DIAGNOSIS — T45.1X5A AROMATASE INHIBITOR-ASSOCIATED ARTHRALGIA: ICD-10-CM

## 2023-02-20 PROCEDURE — 99214 OFFICE O/P EST MOD 30 MIN: CPT | Performed by: INTERNAL MEDICINE

## 2023-04-14 ENCOUNTER — LAB ENCOUNTER (OUTPATIENT)
Dept: LAB | Facility: HOSPITAL | Age: 68
End: 2023-04-14
Attending: INTERNAL MEDICINE
Payer: MEDICARE

## 2023-04-14 DIAGNOSIS — I10 ESSENTIAL HYPERTENSION: ICD-10-CM

## 2023-04-14 DIAGNOSIS — E78.2 MIXED HYPERLIPIDEMIA: ICD-10-CM

## 2023-04-14 DIAGNOSIS — R73.03 BORDERLINE DIABETES: ICD-10-CM

## 2023-04-14 DIAGNOSIS — Z00.00 ENCOUNTER FOR ANNUAL HEALTH EXAMINATION: ICD-10-CM

## 2023-04-14 LAB
ALBUMIN SERPL-MCNC: 4 G/DL (ref 3.4–5)
ALBUMIN/GLOB SERPL: 1 {RATIO} (ref 1–2)
ALP LIVER SERPL-CCNC: 65 U/L
ALT SERPL-CCNC: 34 U/L
ANION GAP SERPL CALC-SCNC: 6 MMOL/L (ref 0–18)
AST SERPL-CCNC: 23 U/L (ref 15–37)
BILIRUB SERPL-MCNC: 0.5 MG/DL (ref 0.1–2)
BUN BLD-MCNC: 30 MG/DL (ref 7–18)
BUN/CREAT SERPL: 36.6 (ref 10–20)
CALCIUM BLD-MCNC: 9.4 MG/DL (ref 8.5–10.1)
CHLORIDE SERPL-SCNC: 110 MMOL/L (ref 98–112)
CHOLEST SERPL-MCNC: 164 MG/DL (ref ?–200)
CO2 SERPL-SCNC: 25 MMOL/L (ref 21–32)
CREAT BLD-MCNC: 0.82 MG/DL
DEPRECATED RDW RBC AUTO: 44.1 FL (ref 35.1–46.3)
ERYTHROCYTE [DISTWIDTH] IN BLOOD BY AUTOMATED COUNT: 12.9 % (ref 11–15)
EST. AVERAGE GLUCOSE BLD GHB EST-MCNC: 117 MG/DL (ref 68–126)
FASTING PATIENT LIPID ANSWER: YES
FASTING STATUS PATIENT QL REPORTED: YES
GFR SERPLBLD BASED ON 1.73 SQ M-ARVRAT: 78 ML/MIN/1.73M2 (ref 60–?)
GLOBULIN PLAS-MCNC: 4.1 G/DL (ref 2.8–4.4)
GLUCOSE BLD-MCNC: 107 MG/DL (ref 70–99)
HBA1C MFR BLD: 5.7 % (ref ?–5.7)
HCT VFR BLD AUTO: 43.6 %
HDLC SERPL-MCNC: 79 MG/DL (ref 40–59)
HGB BLD-MCNC: 14 G/DL
LDLC SERPL CALC-MCNC: 71 MG/DL (ref ?–100)
MCH RBC QN AUTO: 30.1 PG (ref 26–34)
MCHC RBC AUTO-ENTMCNC: 32.1 G/DL (ref 31–37)
MCV RBC AUTO: 93.8 FL
NONHDLC SERPL-MCNC: 85 MG/DL (ref ?–130)
OSMOLALITY SERPL CALC.SUM OF ELEC: 299 MOSM/KG (ref 275–295)
PLATELET # BLD AUTO: 235 10(3)UL (ref 150–450)
POTASSIUM SERPL-SCNC: 4.5 MMOL/L (ref 3.5–5.1)
PROT SERPL-MCNC: 8.1 G/DL (ref 6.4–8.2)
RBC # BLD AUTO: 4.65 X10(6)UL
SODIUM SERPL-SCNC: 141 MMOL/L (ref 136–145)
TRIGL SERPL-MCNC: 73 MG/DL (ref 30–149)
TSI SER-ACNC: 1.02 MIU/ML (ref 0.36–3.74)
VLDLC SERPL CALC-MCNC: 11 MG/DL (ref 0–30)
WBC # BLD AUTO: 7.7 X10(3) UL (ref 4–11)

## 2023-04-14 PROCEDURE — 83036 HEMOGLOBIN GLYCOSYLATED A1C: CPT

## 2023-04-14 PROCEDURE — 80053 COMPREHEN METABOLIC PANEL: CPT

## 2023-04-14 PROCEDURE — 80061 LIPID PANEL: CPT

## 2023-04-14 PROCEDURE — 84443 ASSAY THYROID STIM HORMONE: CPT

## 2023-04-14 PROCEDURE — 36415 COLL VENOUS BLD VENIPUNCTURE: CPT

## 2023-04-14 PROCEDURE — 85027 COMPLETE CBC AUTOMATED: CPT

## 2023-04-17 ENCOUNTER — NURSE ONLY (OUTPATIENT)
Dept: RHEUMATOLOGY | Facility: CLINIC | Age: 68
End: 2023-04-17

## 2023-04-17 DIAGNOSIS — M81.0 AGE-RELATED OSTEOPOROSIS WITHOUT CURRENT PATHOLOGICAL FRACTURE: Primary | ICD-10-CM

## 2023-04-17 PROCEDURE — 96372 THER/PROPH/DIAG INJ SC/IM: CPT | Performed by: INTERNAL MEDICINE

## 2023-04-17 NOTE — PROGRESS NOTES
Name and  verified. Pt aware she was to receive Prolia injection. Pt denies and issues with 1 st injection given 6 months ago. Pt injected into left upper arm. Pt tolerated well. Pt aware to follow up in 6 months for next injection. Component      Latest Ref Rng 2023   Glucose      70 - 99 mg/dL 107 (H)    Sodium      136 - 145 mmol/L 141    Potassium      3.5 - 5.1 mmol/L 4.5    Chloride      98 - 112 mmol/L 110    Carbon Dioxide, Total      21.0 - 32.0 mmol/L 25.0    ANION GAP      0 - 18 mmol/L 6    BUN      7 - 18 mg/dL 30 (H)    CREATININE      0.55 - 1.02 mg/dL 0.82    BUN/CREATININE RATIO      10.0 - 20.0  36.6 (H)    CALCIUM      8.5 - 10.1 mg/dL 9.4    CALCULATED OSMOLALITY      275 - 295 mOsm/kg 299 (H)    eGFR-Cr      >=60 mL/min/1.73m2 78    ALT (SGPT)      13 - 56 U/L 34    AST (SGOT)      15 - 37 U/L 23    ALKALINE PHOSPHATASE      55 - 142 U/L 65    Total Bilirubin      0.1 - 2.0 mg/dL 0.5    PROTEIN, TOTAL      6.4 - 8.2 g/dL 8.1    Albumin      3.4 - 5.0 g/dL 4.0    Globulin      2.8 - 4.4 g/dL 4.1    A/G Ratio      1.0 - 2.0  1.0    Patient Fasting for CMP?  Yes       Legend:  (H) High

## 2023-06-01 ENCOUNTER — HOSPITAL ENCOUNTER (OUTPATIENT)
Dept: GENERAL RADIOLOGY | Facility: HOSPITAL | Age: 68
Discharge: HOME | End: 2023-06-01
Attending: NURSE PRACTITIONER
Payer: MEDICARE

## 2023-06-01 ENCOUNTER — OFFICE VISIT (OUTPATIENT)
Dept: INTERNAL MEDICINE CLINIC | Facility: CLINIC | Age: 68
End: 2023-06-01

## 2023-06-01 VITALS
WEIGHT: 135 LBS | HEART RATE: 74 BPM | SYSTOLIC BLOOD PRESSURE: 144 MMHG | HEIGHT: 62 IN | BODY MASS INDEX: 24.84 KG/M2 | RESPIRATION RATE: 16 BRPM | DIASTOLIC BLOOD PRESSURE: 82 MMHG

## 2023-06-01 DIAGNOSIS — M79.605 LEFT LEG PAIN: ICD-10-CM

## 2023-06-01 DIAGNOSIS — M81.0 AGE-RELATED OSTEOPOROSIS WITHOUT CURRENT PATHOLOGICAL FRACTURE: ICD-10-CM

## 2023-06-01 DIAGNOSIS — M79.605 LEFT LEG PAIN: Primary | ICD-10-CM

## 2023-06-01 PROCEDURE — 73502 X-RAY EXAM HIP UNI 2-3 VIEWS: CPT | Performed by: NURSE PRACTITIONER

## 2023-06-01 PROCEDURE — 99213 OFFICE O/P EST LOW 20 MIN: CPT | Performed by: NURSE PRACTITIONER

## 2023-06-01 RX ORDER — MELOXICAM 7.5 MG/1
7.5 TABLET ORAL DAILY
Qty: 30 TABLET | Refills: 0 | Status: SHIPPED | OUTPATIENT
Start: 2023-06-01

## 2023-06-01 NOTE — PATIENT INSTRUCTIONS
Start taking meloxicam daily     Ok to take tylenol if needed     Do not take aleve when taking meloxicam

## 2023-07-20 ENCOUNTER — HOSPITAL ENCOUNTER (OUTPATIENT)
Dept: MAMMOGRAPHY | Facility: HOSPITAL | Age: 68
Discharge: HOME | End: 2023-07-20
Attending: INTERNAL MEDICINE
Payer: MEDICARE

## 2023-07-20 ENCOUNTER — HOSPITAL ENCOUNTER (OUTPATIENT)
Dept: BONE DENSITY | Facility: HOSPITAL | Age: 68
Discharge: HOME | End: 2023-07-20
Attending: INTERNAL MEDICINE
Payer: MEDICARE

## 2023-07-20 DIAGNOSIS — Z78.0 OSTEOPENIA AFTER MENOPAUSE: ICD-10-CM

## 2023-07-20 DIAGNOSIS — M85.80 OSTEOPENIA AFTER MENOPAUSE: ICD-10-CM

## 2023-07-20 DIAGNOSIS — Z12.31 SCREENING MAMMOGRAM, ENCOUNTER FOR: ICD-10-CM

## 2023-07-20 PROCEDURE — 77063 BREAST TOMOSYNTHESIS BI: CPT | Performed by: INTERNAL MEDICINE

## 2023-07-20 PROCEDURE — 77080 DXA BONE DENSITY AXIAL: CPT | Performed by: INTERNAL MEDICINE

## 2023-07-20 PROCEDURE — 77067 SCR MAMMO BI INCL CAD: CPT | Performed by: INTERNAL MEDICINE

## 2023-08-24 ENCOUNTER — OFFICE VISIT (OUTPATIENT)
Dept: HEMATOLOGY/ONCOLOGY | Facility: HOSPITAL | Age: 68
End: 2023-08-24
Attending: INTERNAL MEDICINE
Payer: MEDICARE

## 2023-08-24 VITALS
TEMPERATURE: 98 F | DIASTOLIC BLOOD PRESSURE: 74 MMHG | WEIGHT: 127 LBS | SYSTOLIC BLOOD PRESSURE: 144 MMHG | RESPIRATION RATE: 18 BRPM | BODY MASS INDEX: 23 KG/M2 | HEART RATE: 77 BPM | OXYGEN SATURATION: 99 %

## 2023-08-24 DIAGNOSIS — C50.411 MALIGNANT NEOPLASM OF UPPER-OUTER QUADRANT OF RIGHT BREAST IN FEMALE, ESTROGEN RECEPTOR POSITIVE: Primary | ICD-10-CM

## 2023-08-24 DIAGNOSIS — Z79.811 ENCOUNTER FOR MONITORING AROMATASE INHIBITOR THERAPY: ICD-10-CM

## 2023-08-24 DIAGNOSIS — Z08 ENCOUNTER FOR FOLLOW-UP SURVEILLANCE OF BREAST CANCER: ICD-10-CM

## 2023-08-24 DIAGNOSIS — Z17.0 MALIGNANT NEOPLASM OF UPPER-OUTER QUADRANT OF RIGHT BREAST IN FEMALE, ESTROGEN RECEPTOR POSITIVE: Primary | ICD-10-CM

## 2023-08-24 DIAGNOSIS — Z51.81 ENCOUNTER FOR MONITORING AROMATASE INHIBITOR THERAPY: ICD-10-CM

## 2023-08-24 DIAGNOSIS — Z85.3 ENCOUNTER FOR FOLLOW-UP SURVEILLANCE OF BREAST CANCER: ICD-10-CM

## 2023-08-24 PROCEDURE — 99214 OFFICE O/P EST MOD 30 MIN: CPT | Performed by: INTERNAL MEDICINE

## 2023-10-16 ENCOUNTER — LAB ENCOUNTER (OUTPATIENT)
Dept: LAB | Facility: HOSPITAL | Age: 68
End: 2023-10-16
Attending: NURSE PRACTITIONER
Payer: MEDICARE

## 2023-10-16 DIAGNOSIS — M81.0 AGE-RELATED OSTEOPOROSIS WITHOUT CURRENT PATHOLOGICAL FRACTURE: ICD-10-CM

## 2023-10-16 LAB
ANION GAP SERPL CALC-SCNC: 5 MMOL/L (ref 0–18)
BUN BLD-MCNC: 12 MG/DL (ref 7–18)
BUN/CREAT SERPL: 15 (ref 10–20)
CALCIUM BLD-MCNC: 9.1 MG/DL (ref 8.5–10.1)
CHLORIDE SERPL-SCNC: 105 MMOL/L (ref 98–112)
CO2 SERPL-SCNC: 29 MMOL/L (ref 21–32)
CREAT BLD-MCNC: 0.8 MG/DL
EGFRCR SERPLBLD CKD-EPI 2021: 80 ML/MIN/1.73M2 (ref 60–?)
FASTING STATUS PATIENT QL REPORTED: NO
GLUCOSE BLD-MCNC: 117 MG/DL (ref 70–99)
OSMOLALITY SERPL CALC.SUM OF ELEC: 289 MOSM/KG (ref 275–295)
POTASSIUM SERPL-SCNC: 4.4 MMOL/L (ref 3.5–5.1)
SODIUM SERPL-SCNC: 139 MMOL/L (ref 136–145)

## 2023-10-16 PROCEDURE — 36415 COLL VENOUS BLD VENIPUNCTURE: CPT

## 2023-10-16 PROCEDURE — 80048 BASIC METABOLIC PNL TOTAL CA: CPT

## 2023-10-18 ENCOUNTER — NURSE ONLY (OUTPATIENT)
Dept: RHEUMATOLOGY | Facility: CLINIC | Age: 68
End: 2023-10-18

## 2023-10-18 DIAGNOSIS — M85.80 OSTEOPENIA, UNSPECIFIED LOCATION: Primary | ICD-10-CM

## 2023-10-18 PROCEDURE — 96372 THER/PROPH/DIAG INJ SC/IM: CPT | Performed by: INTERNAL MEDICINE

## 2023-10-18 NOTE — PROGRESS NOTES
Patient came to office for 6 month follow up Prolia injection. Labs reviewed and WNL. Name and  verified. Patient aware she/he is to receive Prolia injection. Injection given on subcutaneous on left upper arm, patient tolerated injection well. Patient given reminder note for 6 month follow up and lab order to complete. Patient agreeable with plan and will call office with any questions or concerns. Instructed pt to make follow-up appt with Dr Grayson Bagley as she is overdue; informed pt Dr Grayson Bagley sees Prolia pts annually.

## 2023-10-22 RX ORDER — ATORVASTATIN CALCIUM 20 MG/1
20 TABLET, FILM COATED ORAL DAILY
Qty: 90 TABLET | Refills: 3 | Status: SHIPPED | OUTPATIENT
Start: 2023-10-22

## 2023-10-22 NOTE — TELEPHONE ENCOUNTER
Refill passed per [facility] protocol.    Requested Prescriptions   Pending Prescriptions Disp Refills    atorvastatin 20 MG Oral Tab 90 tablet 1     Sig: Take 1 tablet (20 mg total) by mouth daily.       Cholesterol Medication Protocol Passed - 10/21/2023 11:44 AM        Passed - ALT in past 12 months        Passed - LDL in past 12 months        Passed - Last ALT < 80     Lab Results   Component Value Date    ALT 34 04/14/2023             Passed - Last LDL < 130     Lab Results   Component Value Date    LDL 71 04/14/2023             Passed - In person appointment or virtual visit in the past 12 mos or appointment in next 3 mos     Recent Outpatient Visits              4 days ago Osteopenia, unspecified location    [facility]    Nurse Only    1 month ago Malignant neoplasm of upper-outer quadrant of right breast in female, estrogen receptor positive  [C50.411, Z17.0 (ICD-10-CM)]    [facility] Hematology Oncology Edilia Craig MD    Office Visit    4 months ago Left leg pain    [facility] Mandy Rucker APRN    Office Visit    6 months ago Age-related osteoporosis without current pathological fracture    [facility]    Nurse Only    8 months ago Malignant neoplasm of upper-outer quadrant of right breast in female, estrogen receptor positive (HCC)    [facility] Hematology Oncology Edilia Craig MD    Office Visit          Future Appointments         Provider Department Appt Notes    In 3 weeks Gwendolyn Khanna MD [facility] last was 11-10-22  (policy informed)    In 1 month Sinai Garcia MD [facility] follow up    In 4 months Edilia Craig MD [facility] Hematology Oncology 6 month f/u    In 5 months [facility] RN RHEUMATOLOGY [facility]  [place] Prolia                           Future Appointments         Provider Department Appt Notes    In 3 weeks Gwendolyn Khanna MD [facility] last was 11-10-22  (policy informed)    In 1 month Sinai Garcia MD [facility] follow up    In 4 months Edilia Craig MD [facility] Hematology Oncology 6 month f/u    In 5 months [facility] RN RHEUMATOLOGY [facility] Prolia            Recent Outpatient Visits              4 days ago Osteopenia, unspecified location    [facility]    Nurse Only    1 month ago Malignant neoplasm of upper-outer quadrant of right breast in female, estrogen receptor positive  [C50.411, Z17.0 (ICD-10-CM)]    [facility] Hematology Oncology Edilia Craig MD    Office Visit    4 months ago Left leg pain    [facility] Mandy Rucker APRN    Office Visit    6 months ago Age-related osteoporosis without current pathological fracture    [facility]    Nurse Only    8 months ago Malignant neoplasm of upper-outer quadrant of right breast in female, estrogen receptor positive (HCC)    [facility] Hematology Oncology Edilia Craig MD    Office Visit

## 2023-10-31 ENCOUNTER — TELEPHONE (OUTPATIENT)
Dept: HEMATOLOGY/ONCOLOGY | Facility: HOSPITAL | Age: 68
End: 2023-10-31

## 2023-10-31 DIAGNOSIS — Z12.31 SCREENING MAMMOGRAM, ENCOUNTER FOR: Primary | ICD-10-CM

## 2023-10-31 NOTE — TELEPHONE ENCOUNTER
Returned phone call and notified [place] will be due after 7/20/24. Order in system. Patient verbalizes understanding.

## 2023-11-15 ENCOUNTER — LAB ENCOUNTER (OUTPATIENT)
Dept: LAB | Age: 68
End: 2023-11-15
Attending: INTERNAL MEDICINE
Payer: MEDICARE

## 2023-11-15 ENCOUNTER — OFFICE VISIT (OUTPATIENT)
Dept: INTERNAL MEDICINE CLINIC | Facility: CLINIC | Age: 68
End: 2023-11-15

## 2023-11-15 VITALS
BODY MASS INDEX: 22.08 KG/M2 | SYSTOLIC BLOOD PRESSURE: 149 MMHG | RESPIRATION RATE: 16 BRPM | HEIGHT: 62 IN | HEART RATE: 76 BPM | DIASTOLIC BLOOD PRESSURE: 79 MMHG | WEIGHT: 120 LBS

## 2023-11-15 DIAGNOSIS — Z01.84 IMMUNITY TO MEASLES, MUMPS, AND RUBELLA DETERMINED BY SEROLOGIC TEST: ICD-10-CM

## 2023-11-15 DIAGNOSIS — J30.1 ALLERGIC RHINITIS DUE TO POLLEN, UNSPECIFIED SEASONALITY: ICD-10-CM

## 2023-11-15 DIAGNOSIS — M48.062 SPINAL STENOSIS OF LUMBAR REGION WITH NEUROGENIC CLAUDICATION: ICD-10-CM

## 2023-11-15 DIAGNOSIS — M54.16 LUMBAR RADICULOPATHY: ICD-10-CM

## 2023-11-15 DIAGNOSIS — R73.03 PREDIABETES: ICD-10-CM

## 2023-11-15 DIAGNOSIS — Z80.3 FAMILY HISTORY OF BREAST CANCER IN FIRST DEGREE RELATIVE: ICD-10-CM

## 2023-11-15 DIAGNOSIS — M81.0 AGE-RELATED OSTEOPOROSIS WITHOUT CURRENT PATHOLOGICAL FRACTURE: ICD-10-CM

## 2023-11-15 DIAGNOSIS — Z79.811 ENCOUNTER FOR MONITORING AROMATASE INHIBITOR THERAPY: ICD-10-CM

## 2023-11-15 DIAGNOSIS — R76.11 NONSPECIFIC REACTION TO TUBERCULIN SKIN TEST: ICD-10-CM

## 2023-11-15 DIAGNOSIS — Z00.00 ENCOUNTER FOR ANNUAL HEALTH EXAMINATION: Primary | ICD-10-CM

## 2023-11-15 DIAGNOSIS — Z01.84 IMMUNITY TO HEPATITIS B VIRUS DEMONSTRATED BY SEROLOGIC TEST: ICD-10-CM

## 2023-11-15 DIAGNOSIS — I10 ESSENTIAL HYPERTENSION: ICD-10-CM

## 2023-11-15 DIAGNOSIS — M79.605 LEFT LEG PAIN: ICD-10-CM

## 2023-11-15 DIAGNOSIS — E78.2 MIXED HYPERLIPIDEMIA: ICD-10-CM

## 2023-11-15 DIAGNOSIS — C50.411 MALIGNANT NEOPLASM OF UPPER-OUTER QUADRANT OF RIGHT BREAST IN FEMALE, ESTROGEN RECEPTOR POSITIVE: ICD-10-CM

## 2023-11-15 DIAGNOSIS — M26.609 TMJ (TEMPOROMANDIBULAR JOINT SYNDROME): Chronic | ICD-10-CM

## 2023-11-15 DIAGNOSIS — Z00.00 ENCOUNTER FOR ANNUAL HEALTH EXAMINATION: ICD-10-CM

## 2023-11-15 DIAGNOSIS — Z17.0 MALIGNANT NEOPLASM OF UPPER-OUTER QUADRANT OF RIGHT BREAST IN FEMALE, ESTROGEN RECEPTOR POSITIVE: ICD-10-CM

## 2023-11-15 DIAGNOSIS — N39.46 MIXED STRESS AND URGE URINARY INCONTINENCE: ICD-10-CM

## 2023-11-15 DIAGNOSIS — D69.2 SENILE PURPURA (HCC): ICD-10-CM

## 2023-11-15 DIAGNOSIS — C50.919 MALIGNANT NEOPLASM OF FEMALE BREAST, UNSPECIFIED ESTROGEN RECEPTOR STATUS, UNSPECIFIED LATERALITY, UNSPECIFIED SITE OF BREAST (HCC): ICD-10-CM

## 2023-11-15 DIAGNOSIS — H93.19 SUBJECTIVE TINNITUS, UNSPECIFIED LATERALITY: ICD-10-CM

## 2023-11-15 DIAGNOSIS — Z51.81 ENCOUNTER FOR MONITORING AROMATASE INHIBITOR THERAPY: ICD-10-CM

## 2023-11-15 DIAGNOSIS — E55.9 VITAMIN D DEFICIENCY: ICD-10-CM

## 2023-11-15 DIAGNOSIS — J34.3 HYPERTROPHY OF NASAL TURBINATES: ICD-10-CM

## 2023-11-15 PROBLEM — K13.0 LIP LESION: Status: RESOLVED | Noted: 2022-08-24 | Resolved: 2023-11-15

## 2023-11-15 LAB
ALBUMIN SERPL-MCNC: 4.8 G/DL (ref 3.2–4.8)
ALBUMIN/GLOB SERPL: 1.5 {RATIO} (ref 1–2)
ALP LIVER SERPL-CCNC: 60 U/L
ALT SERPL-CCNC: 29 U/L
ANION GAP SERPL CALC-SCNC: 7 MMOL/L (ref 0–18)
AST SERPL-CCNC: 28 U/L (ref ?–34)
BILIRUB SERPL-MCNC: 0.8 MG/DL (ref 0.2–1.1)
BUN BLD-MCNC: 15 MG/DL (ref 9–23)
BUN/CREAT SERPL: 18.3 (ref 10–20)
CALCIUM BLD-MCNC: 9.7 MG/DL (ref 8.7–10.4)
CHLORIDE SERPL-SCNC: 106 MMOL/L (ref 98–112)
CHOLEST SERPL-MCNC: 155 MG/DL (ref ?–200)
CO2 SERPL-SCNC: 26 MMOL/L (ref 21–32)
CREAT BLD-MCNC: 0.82 MG/DL
DEPRECATED RDW RBC AUTO: 45.2 FL (ref 35.1–46.3)
EGFRCR SERPLBLD CKD-EPI 2021: 78 ML/MIN/1.73M2 (ref 60–?)
ERYTHROCYTE [DISTWIDTH] IN BLOOD BY AUTOMATED COUNT: 13.2 % (ref 11–15)
EST. AVERAGE GLUCOSE BLD GHB EST-MCNC: 120 MG/DL (ref 68–126)
FASTING PATIENT LIPID ANSWER: YES
FASTING STATUS PATIENT QL REPORTED: YES
GLOBULIN PLAS-MCNC: 3.1 G/DL (ref 2.8–4.4)
GLUCOSE BLD-MCNC: 93 MG/DL (ref 70–99)
HBA1C MFR BLD: 5.8 % (ref ?–5.7)
HBV SURFACE AB SER QL: REACTIVE
HBV SURFACE AB SERPL IA-ACNC: 209.03 MIU/ML
HCT VFR BLD AUTO: 43.8 %
HDLC SERPL-MCNC: 72 MG/DL (ref 40–59)
HGB BLD-MCNC: 13.8 G/DL
LDLC SERPL CALC-MCNC: 66 MG/DL (ref ?–100)
MCH RBC QN AUTO: 29.4 PG (ref 26–34)
MCHC RBC AUTO-ENTMCNC: 31.5 G/DL (ref 31–37)
MCV RBC AUTO: 93.4 FL
NONHDLC SERPL-MCNC: 83 MG/DL (ref ?–130)
OSMOLALITY SERPL CALC.SUM OF ELEC: 289 MOSM/KG (ref 275–295)
PLATELET # BLD AUTO: 257 10(3)UL (ref 150–450)
POTASSIUM SERPL-SCNC: 4 MMOL/L (ref 3.5–5.1)
PROT SERPL-MCNC: 7.9 G/DL (ref 5.7–8.2)
RBC # BLD AUTO: 4.69 X10(6)UL
RUBV IGG SER QL: POSITIVE
RUBV IGG SER-ACNC: 11.4 IU/ML (ref 10–?)
SODIUM SERPL-SCNC: 139 MMOL/L (ref 136–145)
TRIGL SERPL-MCNC: 92 MG/DL (ref 30–149)
TSI SER-ACNC: 0.97 MIU/ML (ref 0.55–4.78)
VIT D+METAB SERPL-MCNC: 75.6 NG/ML (ref 30–100)
VLDLC SERPL CALC-MCNC: 14 MG/DL (ref 0–30)
WBC # BLD AUTO: 5.7 X10(3) UL (ref 4–11)

## 2023-11-15 PROCEDURE — 86765 RUBEOLA ANTIBODY: CPT

## 2023-11-15 PROCEDURE — 36415 COLL VENOUS BLD VENIPUNCTURE: CPT

## 2023-11-15 PROCEDURE — 83036 HEMOGLOBIN GLYCOSYLATED A1C: CPT

## 2023-11-15 PROCEDURE — 86762 RUBELLA ANTIBODY: CPT

## 2023-11-15 PROCEDURE — 85027 COMPLETE CBC AUTOMATED: CPT

## 2023-11-15 PROCEDURE — 84443 ASSAY THYROID STIM HORMONE: CPT

## 2023-11-15 PROCEDURE — 82306 VITAMIN D 25 HYDROXY: CPT

## 2023-11-15 PROCEDURE — 80061 LIPID PANEL: CPT

## 2023-11-15 PROCEDURE — 86735 MUMPS ANTIBODY: CPT

## 2023-11-15 PROCEDURE — 80053 COMPREHEN METABOLIC PANEL: CPT

## 2023-11-15 PROCEDURE — G0438 PPPS, INITIAL VISIT: HCPCS | Performed by: INTERNAL MEDICINE

## 2023-11-15 PROCEDURE — 86706 HEP B SURFACE ANTIBODY: CPT

## 2023-11-15 RX ORDER — MELOXICAM 7.5 MG/1
7.5 TABLET ORAL DAILY
Qty: 30 TABLET | Refills: 0 | Status: SHIPPED | OUTPATIENT
Start: 2023-11-15

## 2023-11-17 LAB
MEV IGG SER-ACNC: 163 AU/ML (ref 16.5–?)
MUV IGG SER IA-ACNC: 140 AU/ML (ref 11–?)

## 2023-12-07 ENCOUNTER — APPOINTMENT (OUTPATIENT)
Dept: URBAN - METROPOLITAN AREA CLINIC 244 | Age: 68
Setting detail: DERMATOLOGY
End: 2023-12-07

## 2023-12-07 DIAGNOSIS — K13.0 DISEASES OF LIPS: ICD-10-CM

## 2023-12-07 PROCEDURE — 99204 OFFICE O/P NEW MOD 45 MIN: CPT

## 2023-12-07 PROCEDURE — OTHER PRESCRIPTION MEDICATION MANAGEMENT: OTHER

## 2023-12-07 PROCEDURE — OTHER PRESCRIPTION: OTHER

## 2023-12-07 PROCEDURE — OTHER COUNSELING: OTHER

## 2023-12-07 RX ORDER — HYDROCORTISONE 25 MG/G
CREAM TOPICAL
Qty: 28.35 | Refills: 0 | Status: ERX

## 2023-12-07 RX ORDER — NYSTATIN CREAM 100000 [USP'U]/G
CREAM TOPICAL
Qty: 30 | Refills: 2 | Status: ERX

## 2023-12-07 RX ORDER — HYDROCORTISONE 25 MG/G
CREAM TOPICAL
Qty: 28.35 | Refills: 0 | Status: ERX | COMMUNITY
Start: 2023-12-07

## 2023-12-07 RX ORDER — NYSTATIN CREAM 100000 [USP'U]/G
CREAM TOPICAL
Qty: 30 | Refills: 2 | Status: ERX | COMMUNITY
Start: 2023-12-07

## 2023-12-07 ASSESSMENT — LOCATION DETAILED DESCRIPTION DERM: LOCATION DETAILED: LEFT SUPERIOR VERMILION LIP

## 2023-12-07 ASSESSMENT — LOCATION SIMPLE DESCRIPTION DERM: LOCATION SIMPLE: LEFT LIP

## 2023-12-07 ASSESSMENT — LOCATION ZONE DERM: LOCATION ZONE: LIP

## 2023-12-16 ENCOUNTER — PATIENT MESSAGE (OUTPATIENT)
Dept: INTERNAL MEDICINE CLINIC | Facility: CLINIC | Age: 68
End: 2023-12-16

## 2023-12-18 NOTE — TELEPHONE ENCOUNTER
From: Ru Monzon  To: Mary Dallas  Sent: 12/16/2023 4:56 PM CST  Subject: Blood pressure    Hi Dr Ilana Celaya,    My apology for not sending my BP sooner. I forgot all about it until I saw your note. 12/14/23 took water pill. /78 at 1155  12/15/23 : 115/81 at 0850  12/16/23 : 114/74 at 0610.     Thank you    Elfego Seals

## 2023-12-19 ENCOUNTER — OFFICE VISIT (OUTPATIENT)
Dept: RHEUMATOLOGY | Facility: CLINIC | Age: 68
End: 2023-12-19

## 2023-12-19 VITALS
HEART RATE: 71 BPM | SYSTOLIC BLOOD PRESSURE: 157 MMHG | HEIGHT: 62 IN | BODY MASS INDEX: 21.53 KG/M2 | DIASTOLIC BLOOD PRESSURE: 88 MMHG | WEIGHT: 117 LBS

## 2023-12-19 DIAGNOSIS — M79.641 BILATERAL HAND PAIN: ICD-10-CM

## 2023-12-19 DIAGNOSIS — M85.80 OSTEOPENIA, UNSPECIFIED LOCATION: Primary | ICD-10-CM

## 2023-12-19 DIAGNOSIS — M79.642 BILATERAL HAND PAIN: ICD-10-CM

## 2023-12-19 DIAGNOSIS — Z51.81 MEDICATION MONITORING ENCOUNTER: ICD-10-CM

## 2023-12-19 PROCEDURE — 99214 OFFICE O/P EST MOD 30 MIN: CPT | Performed by: INTERNAL MEDICINE

## 2023-12-19 NOTE — PATIENT INSTRUCTIONS
Seen today for osteopenia  Continue Prolia every 6 months, calcium and vitamin D  Recent bone density does not really show improvement but he only got 3 Prolia injections  Continue meloxicam as needed for your hand pain  Continue to get Prolia every 6 months.   Blood work is ordered  I will see you next year

## 2024-01-11 ENCOUNTER — APPOINTMENT (OUTPATIENT)
Dept: URBAN - METROPOLITAN AREA CLINIC 244 | Age: 69
Setting detail: DERMATOLOGY
End: 2024-01-11

## 2024-01-11 DIAGNOSIS — K13.0 DISEASES OF LIPS: ICD-10-CM

## 2024-01-11 PROCEDURE — OTHER COUNSELING: OTHER

## 2024-01-11 PROCEDURE — 99214 OFFICE O/P EST MOD 30 MIN: CPT

## 2024-01-11 PROCEDURE — OTHER PRESCRIPTION MEDICATION MANAGEMENT: OTHER

## 2024-01-11 PROCEDURE — OTHER PRESCRIPTION: OTHER

## 2024-01-11 RX ORDER — HYDROCORTISONE 25 MG/G
CREAM TOPICAL
Qty: 28.35 | Refills: 0 | Status: ACTIVE

## 2024-01-11 RX ORDER — NYSTATIN CREAM 100000 [USP'U]/G
CREAM TOPICAL
Qty: 30 | Refills: 2 | Status: ACTIVE

## 2024-01-11 ASSESSMENT — LOCATION DETAILED DESCRIPTION DERM: LOCATION DETAILED: LEFT SUPERIOR VERMILION LIP

## 2024-01-11 ASSESSMENT — LOCATION SIMPLE DESCRIPTION DERM: LOCATION SIMPLE: LEFT LIP

## 2024-01-11 ASSESSMENT — LOCATION ZONE DERM: LOCATION ZONE: LIP

## 2024-01-11 NOTE — PROCEDURE: PRESCRIPTION MEDICATION MANAGEMENT
Plan: Recommended pt see Dentist to determine if condition is caused due to misalignment.\\nOr \\Ginny Dennis for second opinion in a month
Render In Strict Bullet Format?: No
Detail Level: Zone
Continue Regimen: Vaseline at night\\n\\nDiscontinue for 1 week and restart for only 3 weeks \\nhydrocortisone 2.5 % topical cream \\nQuantity: 28.35 g  Days Supply: 30\\nSig: Apply BID to AA, mix with nystatin and apply to corners of mouth.\\n\\nnystatin 100,000 unit/gram topical cream \\nQuantity: 30.0 g  Days Supply: 30\\nSig: Apply BID to AA. mix with hydrocortisone and apply to corners of mouth.

## 2024-01-15 ENCOUNTER — MED REC SCAN ONLY (OUTPATIENT)
Dept: INTERNAL MEDICINE CLINIC | Facility: CLINIC | Age: 69
End: 2024-01-15

## 2024-02-05 ENCOUNTER — APPOINTMENT (OUTPATIENT)
Dept: URBAN - METROPOLITAN AREA CLINIC 244 | Age: 69
Setting detail: DERMATOLOGY
End: 2024-02-07

## 2024-02-05 DIAGNOSIS — K13.0 DISEASES OF LIPS: ICD-10-CM

## 2024-02-05 PROCEDURE — OTHER COUNSELING: OTHER

## 2024-02-05 PROCEDURE — OTHER PRESCRIPTION: OTHER

## 2024-02-05 PROCEDURE — OTHER PRESCRIPTION MEDICATION MANAGEMENT: OTHER

## 2024-02-05 PROCEDURE — 99214 OFFICE O/P EST MOD 30 MIN: CPT

## 2024-02-05 RX ORDER — TRIAMCINOLONE ACETONIDE 1 MG/G
CREAM TOPICAL
Qty: 15 | Refills: 0 | Status: ERX

## 2024-02-05 RX ORDER — HYDROCHLOROTHIAZIDE 25 MG/1
TABLET ORAL
Qty: 90 TABLET | Refills: 0 | Status: SHIPPED | OUTPATIENT
Start: 2024-02-05

## 2024-02-05 RX ORDER — TRIAMCINOLONE ACETONIDE 1 MG/G
CREAM TOPICAL
Qty: 15 | Refills: 0 | Status: ERX | COMMUNITY
Start: 2024-02-05

## 2024-02-05 ASSESSMENT — LOCATION SIMPLE DESCRIPTION DERM: LOCATION SIMPLE: LEFT LIP

## 2024-02-05 ASSESSMENT — LOCATION DETAILED DESCRIPTION DERM: LOCATION DETAILED: LEFT SUPERIOR VERMILION LIP

## 2024-02-05 ASSESSMENT — LOCATION ZONE DERM: LOCATION ZONE: LIP

## 2024-02-05 NOTE — PROCEDURE: PRESCRIPTION MEDICATION MANAGEMENT
Initiate Treatment: Triamcinolone Cream
Detail Level: Zone
Discontinue Regimen: hydrocortisone 2.5 % topical cream \\nQuantity: 28.35 g  Days Supply: 30\\nSig: Apply BID to AA, mix with nystatin and apply to corners of mouth.\\n\\nnystatin 100,000 unit/gram topical cream \\nQuantity: 30.0 g  Days Supply: 30\\nSig: Apply BID to AA. mix with hydrocortisone and apply to corners of mouth.
Plan: Continue Vaseline.
Render In Strict Bullet Format?: No

## 2024-02-05 NOTE — TELEPHONE ENCOUNTER
Refill passed per [facility] protocol.  Requested Prescriptions   Pending Prescriptions Disp Refills    hydroCHLOROthiazide 25 MG Oral Tab 90 tablet 0     Sig: TAKE 1 TABLET BY MOUTH EVERY DAY AS NEEDED       Hypertension Medications Protocol Passed - 2/3/2024  6:11 AM        Passed - CMP or BMP in past 12 months        Passed - Last serum creatinine< 2.0     Lab Results   Component Value Date    CREATSERUM 0.82 11/15/2023               Passed - Appointment in past 6 or next 3 months           Recent Outpatient Visits              1 month ago Osteopenia, unspecified location    [facility] Sinai Garcia MD    Office Visit    2 months ago Encounter for annual health examination    [facility] Gwendolyn Khanna MD    Office Visit    3 months ago Osteopenia, unspecified location    [facility]    Nurse Only    5 months ago Malignant neoplasm of upper-outer quadrant of right breast in female, estrogen receptor positive  [C50.411, Z17.0 (ICD-10-CM)]    [facility] Hematology Oncology Edilia Craig MD    Office Visit    8 months ago Left leg pain    [facility] Mandy Rucker APRN    Office Visit          Future Appointments         Provider Department Appt Notes    In 3 weeks Edilia Craig MD [facility] Hematology Oncology 6 month f/u    In 2 months EC [facility] RN RHEUMATOLOGY [facility] Prolia    In 5 months [facility] RM3 [facility]     In 9 months Gwendolyn Khanna MD [facility] AWV last 11/15/23    In 10 months Sinai Garcia MD [facility] follow up

## 2024-02-07 ENCOUNTER — MED REC SCAN ONLY (OUTPATIENT)
Dept: INTERNAL MEDICINE CLINIC | Facility: CLINIC | Age: 69
End: 2024-02-07

## 2024-02-20 ENCOUNTER — APPOINTMENT (OUTPATIENT)
Dept: URBAN - METROPOLITAN AREA CLINIC 244 | Age: 69
Setting detail: DERMATOLOGY
End: 2024-02-20

## 2024-02-20 DIAGNOSIS — K13.0 DISEASES OF LIPS: ICD-10-CM

## 2024-02-20 PROCEDURE — OTHER PRESCRIPTION MEDICATION MANAGEMENT: OTHER

## 2024-02-20 PROCEDURE — OTHER PRESCRIPTION: OTHER

## 2024-02-20 PROCEDURE — 99214 OFFICE O/P EST MOD 30 MIN: CPT

## 2024-02-20 PROCEDURE — OTHER COUNSELING: OTHER

## 2024-02-20 RX ORDER — TRIAMCINOLONE ACETONIDE 1 MG/G
CREAM TOPICAL
Qty: 15 | Refills: 0 | Status: ERX

## 2024-02-20 ASSESSMENT — LOCATION DETAILED DESCRIPTION DERM: LOCATION DETAILED: LEFT SUPERIOR VERMILION LIP

## 2024-02-20 ASSESSMENT — LOCATION ZONE DERM: LOCATION ZONE: LIP

## 2024-02-20 ASSESSMENT — LOCATION SIMPLE DESCRIPTION DERM: LOCATION SIMPLE: LEFT LIP

## 2024-02-20 NOTE — PROCEDURE: PRESCRIPTION MEDICATION MANAGEMENT
Render In Strict Bullet Format?: No
Plan: Triancinolone BID for 2 weeks then hold for 1 week then repeat.\\nRTC in 6 weeks for f/u
Detail Level: Zone

## 2024-02-29 ENCOUNTER — APPOINTMENT (OUTPATIENT)
Dept: HEMATOLOGY/ONCOLOGY | Facility: HOSPITAL | Age: 69
End: 2024-02-29
Attending: INTERNAL MEDICINE
Payer: MEDICARE

## 2024-03-07 DIAGNOSIS — M79.605 LEFT LEG PAIN: ICD-10-CM

## 2024-03-08 RX ORDER — MELOXICAM 7.5 MG/1
7.5 TABLET ORAL DAILY
Qty: 30 TABLET | Refills: 0 | Status: SHIPPED | OUTPATIENT
Start: 2024-03-08

## 2024-03-08 NOTE — TELEPHONE ENCOUNTER
Please review; protocol failed. Or has no protocol    Requested Prescriptions   Pending Prescriptions Disp Refills    Meloxicam 7.5 MG Oral Tab 30 tablet 0     Sig: Take 1 tablet (7.5 mg total) by mouth daily.       Non-Narcotic Pain Medication Protocol Passed - 3/7/2024 11:02 AM        Passed - In person appointment or virtual visit in the past 6 mos or appointment in next 3 mos     Recent Outpatient Visits              2 months ago Osteopenia, unspecified location    [facility] Sinai Garcia MD    Office Visit    3 months ago Encounter for annual health examination    [facility] Gwendolyn Khanna MD    Office Visit    4 months ago Osteopenia, unspecified location    [facility]    Nurse Only    6 months ago Malignant neoplasm of upper-outer quadrant of right breast in female, estrogen receptor positive  [C50.411, Z17.0 (ICD-10-CM)]    [facility] Hematology Oncology Edilia Craig MD    Office Visit    9 months ago Left leg pain    [facility] Mandy Rucker APRN    Office Visit          Future Appointments         Provider Department Appt Notes    In 3 days Edilia Craig MD [facility] Hematology Oncology 6 month f/u    In 1 week Swapnil Wilburn MD [facility] Pain on L side  LOV 11/2021    In 1 month EC [facility] RN RHEUMATOLOGY [facility] Prolia    In 4 months [facility] DHARMESH RM3 [facility]     In 8 months Gwendolyn Khanna MD [facility] AWV last 11/15/23    In 9 months Sinai Garcia MD [facility] follow up                     Recent Outpatient Visits              2 months ago Osteopenia, unspecified location    [facility]  [facility] Sinai Garcia MD    Office Visit    3 months ago Encounter for annual health examination    [facility] Gwendolyn Khanna MD    Office Visit    4 months ago Osteopenia, unspecified location    [facility]    Nurse Only    6 months ago Malignant neoplasm of upper-outer quadrant of right breast in female, estrogen receptor positive  [C50.411, Z17.0 (ICD-10-CM)]    [facility] Hematology Oncology Edilia Craig MD    Office Visit    9 months ago Left leg pain    [facility] Mandy Rucker APRN    Office Visit           Future Appointments         Provider Department Appt Notes    In 3 days Edilia Craig MD [facility] Hematology Oncology 6 month f/u    In 1 week Swapnil Wilburn MD [facility] Pain on L side  LOV 11/2021    In 1 month EC [facility] RN RHEUMATOLOGY [facility] Prolia    In 4 months [facility] DHARMESH RM3 [facility]     In 8 months Gwendolyn Khanna MD [facility] AWV last 11/15/23    In 9 months Sinai Garcia MD [facility] follow up

## 2024-03-11 ENCOUNTER — OFFICE VISIT (OUTPATIENT)
Dept: HEMATOLOGY/ONCOLOGY | Facility: HOSPITAL | Age: 69
End: 2024-03-11
Attending: INTERNAL MEDICINE
Payer: MEDICARE

## 2024-03-11 VITALS
HEART RATE: 78 BPM | HEIGHT: 62.01 IN | RESPIRATION RATE: 16 BRPM | BODY MASS INDEX: 22.26 KG/M2 | OXYGEN SATURATION: 100 % | DIASTOLIC BLOOD PRESSURE: 74 MMHG | WEIGHT: 122.5 LBS | SYSTOLIC BLOOD PRESSURE: 148 MMHG | TEMPERATURE: 98 F

## 2024-03-11 DIAGNOSIS — Z17.0 MALIGNANT NEOPLASM OF UPPER-OUTER QUADRANT OF RIGHT BREAST IN FEMALE, ESTROGEN RECEPTOR POSITIVE (HCC): Primary | ICD-10-CM

## 2024-03-11 DIAGNOSIS — M25.50 AROMATASE INHIBITOR-ASSOCIATED ARTHRALGIA: ICD-10-CM

## 2024-03-11 DIAGNOSIS — Z78.0 OSTEOPENIA AFTER MENOPAUSE: ICD-10-CM

## 2024-03-11 DIAGNOSIS — Z79.811 ENCOUNTER FOR MONITORING AROMATASE INHIBITOR THERAPY: ICD-10-CM

## 2024-03-11 DIAGNOSIS — C50.411 MALIGNANT NEOPLASM OF UPPER-OUTER QUADRANT OF RIGHT BREAST IN FEMALE, ESTROGEN RECEPTOR POSITIVE (HCC): Primary | ICD-10-CM

## 2024-03-11 DIAGNOSIS — Z08 ENCOUNTER FOR FOLLOW-UP SURVEILLANCE OF BREAST CANCER: ICD-10-CM

## 2024-03-11 DIAGNOSIS — Z12.31 SCREENING MAMMOGRAM, ENCOUNTER FOR: ICD-10-CM

## 2024-03-11 DIAGNOSIS — Z51.81 ENCOUNTER FOR MONITORING AROMATASE INHIBITOR THERAPY: ICD-10-CM

## 2024-03-11 DIAGNOSIS — Z85.3 ENCOUNTER FOR FOLLOW-UP SURVEILLANCE OF BREAST CANCER: ICD-10-CM

## 2024-03-11 DIAGNOSIS — M85.80 OSTEOPENIA AFTER MENOPAUSE: ICD-10-CM

## 2024-03-11 DIAGNOSIS — T45.1X5A AROMATASE INHIBITOR-ASSOCIATED ARTHRALGIA: ICD-10-CM

## 2024-03-11 PROCEDURE — 99214 OFFICE O/P EST MOD 30 MIN: CPT | Performed by: INTERNAL MEDICINE

## 2024-03-11 NOTE — PROGRESS NOTES
HPI     Serenity Willams is a 68 year old female who is here today for follow-up of diagnosis of   Encounter Diagnoses   Name Primary?    Malignant neoplasm of upper-outer quadrant of right breast in female, estrogen receptor positive (HCC) Yes    Encounter for monitoring aromatase inhibitor therapy [Z51.81, Z79.811 (ICD-10-CM)]     Encounter for follow-up surveillance of breast cancer     Osteopenia after menopause     Aromatase inhibitor-associated arthralgia     Screening mammogram, encounter for         Compliance with SBE: Yes. Changes on SBE: No.   Compliance with letrozole:  compliance all of the time  Side effects from letrozole: No hot flashes, Yes arthralgias or myalgias at hands.  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 Went to rheumatology.  Takes meloxicam as needed.    Good ROM on the R arm, some numbness on the R axilla.  No pain.  No swelling.    Taking 3g of flax seed oil and states rarely hot flashes.      Using the treadmill for 30 minutes a day.    ECOG PS 0    Review of Systems:   Review of Systems   Constitutional:  Positive for fatigue. Negative for appetite change and unexpected weight change.   Respiratory:  Negative for cough and shortness of breath.    Cardiovascular:  Negative for chest pain.   Gastrointestinal:  Negative for abdominal pain (one episode of sharp pain at the RUQ, resolved and did not return.).   Endocrine: Negative for hot flashes (resolved with flax seed oil.).   Musculoskeletal:  Positive for arthralgias and back pain (sometimes LBP.  Sciatica follows with PMR.  Off gabapentin, states pain going away with exercise.).   Neurological:  Positive for headaches (on/off.). Negative for dizziness.   Hematological:  Negative for adenopathy. Bruises/bleeds easily.   Psychiatric/Behavioral:  Negative for sleep disturbance.          Meds:  Current Outpatient Medications   Medication Sig Dispense Refill    Meloxicam 7.5 MG Oral Tab Take 1 tablet (7.5 mg total) by mouth daily. 30 tablet 0     hydroCHLOROthiazide 25 MG Oral Tab TAKE 1 TABLET BY MOUTH EVERY DAY AS NEEDED 90 tablet 0    atorvastatin 20 MG Oral Tab Take 1 tablet (20 mg total) by mouth daily. 90 tablet 3    letrozole 2.5 MG Oral Tab Take 1 tablet (2.5 mg total) by mouth daily. 90 tablet 3    tolterodine 1 MG Oral Tab Take 1 tablet (1 mg total) by mouth 2 (two) times daily. 180 tablet 0    mupirocin 2 % External Ointment Apply 1 Application topically daily as needed. To the affected area 22 g 0    Calcium Carbonate Antacid 600 MG Oral Chew Tab Chew 1,200 mg by mouth.      Flaxseed, Linseed, (FLAX SEED OIL) 1000 MG Oral Cap Take by mouth daily.      Turmeric 500 MG Oral Tab Take 1 tablet by mouth daily.      Coenzyme Q10 100 MG Oral Cap Take 100 mg by mouth daily.      Vitamin C 500 MG Oral Tab Take 1 tablet (500 mg total) by mouth daily.      omega-3 fatty acids (FISH OIL) 1000 MG Oral Cap Take  by mouth.      Multiple Vitamin (MULTI-VITAMINS) Oral Tab Take  by mouth.      Garlic 1000 MG Oral Cap Take  by mouth.      cetirizine (ZYRTEC) 10 MG Oral Tab Take 1 tablet (10 mg total) by mouth nightly.       Allergies:   Allergies   Allergen Reactions    Demerol Hcl [Meperidine] NAUSEA AND VOMITING    Macrobid [Nitrofurantoin] RASH    Latex      Other reaction(s): LATEX       Past Medical History:   Diagnosis Date    Breast cancer (HCC)     Chicken pox     Headache     High blood pressure     High cholesterol     Hyperlipidemia     Meniere disease     Osteopenia     PONV (postoperative nausea and vomiting)     PPD positive      Past Surgical History:   Procedure Laterality Date    CHOLECYSTECTOMY      lap cholecystectomy    COLONOSCOPY N/A 12/4/2017    Procedure: COLONOSCOPY;  Surgeon: Donavon Martin MD;  Location: [facility] ENDOSCOPY    COLONOSCOPY N/A 12/15/2022    Procedure: COLONOSCOPY;  Surgeon: Donavon Martin MD;  Location: [facility] ENDO    ELECTROCARDIOGRAM, COMPLETE  12/11/2005    Scanned to media tab     LUMPECTOMY RIGHT Right  10/8/19    Right lumepctomy w SLNB    OTHER SURGICAL HISTORY      Injection Tendon Sheath, Ligament    OTHER SURGICAL HISTORY Left 2012    Bone spur removal    RADIATION RIGHT       Social History     Socioeconomic History    Marital status:    Occupational History    Occupation: retired HD RN   Tobacco Use    Smoking status: Never    Smokeless tobacco: Never   Vaping Use    Vaping Use: Never used   Substance and Sexual Activity    Alcohol use: No    Drug use: No    Sexual activity: Not Currently   Other Topics Concern    Caffeine Concern No       Family History   Problem Relation Age of Onset    Breast Cancer Sister 32        2nd dx @ 70, contralateral    Other (gastro-intestinal cancer) Father 83        d.84; non-smoker    Other (gastric cancer) Brother 63        fmr smoker    Cancer Paternal Aunt         cervical ca    Ovarian Cancer Paternal Aunt     Stroke Mother     Breast Cancer Sister 73    Prostate Cancer Brother 58    Breast Cancer Self 63         PHYSICAL EXAM:    /74 (BP Location: Left arm, Patient Position: Sitting, Cuff Size: adult)   Pulse 78   Temp 98.2 °F (36.8 °C) (Oral)   Resp 16   Ht 1.575 m (5' 2.01\")   Wt 55.6 kg (122 lb 8 oz)   LMP 10/03/1995 (Approximate)   SpO2 100%   BMI 22.40 kg/m²   Wt Readings from Last 6 Encounters:   03/11/24 55.6 kg (122 lb 8 oz)   12/19/23 53.1 kg (117 lb)   11/15/23 54.4 kg (120 lb)   08/24/23 57.6 kg (127 lb)   06/01/23 61.2 kg (135 lb)   02/20/23 61.4 kg (135 lb 6.4 oz)     General: Patient is alert, not in acute distress.  HEENT: EOMs intact. PERRL.  Neck: No JVD. No palpable lymphadenopathy. Neck is supple.  Chest: Clear to auscultation.   Breasts: R breast with surgical scar and axillary scar w/o masses, L breast w/o masses.  Heart: Regular rate and rhythm.   Abdomen: Soft, non tender with good bowel sounds.  Extremities: No edema.  Neurological: Grossly intact.   Lymphatics: There is no palpable lymphadenopathy throughout in the cervical,  supraclavicular, axillary, or inguinal regions.  Psych/Depression: nl      ASSESSMENT/PLAN:     Encounter Diagnoses   Name Primary?    Malignant neoplasm of upper-outer quadrant of right breast in female, estrogen receptor positive (HCC) Yes    Encounter for monitoring aromatase inhibitor therapy [Z51.81, Z79.811 (ICD-10-CM)]     Encounter for follow-up surveillance of breast cancer     Osteopenia after menopause     Aromatase inhibitor-associated arthralgia     Screening mammogram, encounter for         Cancer Staging  Malignant neoplasm of upper-outer quadrant of right breast in female, estrogen receptor positive (HCC)  Staging form: Breast, AJCC 8th Edition  - Clinical stage from 9/6/2019: Stage IA (cT1c, cN0(f), cM0, G3, ER+, NC+, HER2-) - Signed by Edilia Craig MD on 9/11/2019  - Pathologic stage from 10/30/2019: Stage IA (pT1c, pN0(sn), cM0, G3, ER+, NC+, HER2-, Oncotype DX score: 18) - Signed by Edilia Craig MD on 10/30/2019  Luminal B, LVV3Fgn 1+    Serenity Willams is a 68 year old female with ER/NC positive breast cancer, node negative.    Patient status post lumpectomy, and status post radiation therapy.    Oncotype score low risk or recurrence, with Oncotype DX score of 18.   No additional benefit from adjuvant chemotherapy.      On adjuvant hormonal therapy with letrozole.  Patient is to complete total of 5 years of adjuvant hormonal therapy in January 2025. D/w patient that guidelines now recommend extended therapy consideration for up to 8-10 yrs.    The patient does meet criteria for the YAMILKA-1 phase 3 trial evaluating SERD, assess trends compared to standard endocrine therapy for patients with ER positive/HER2 negative early breast cancer with intermediate or high risk who have completed at least 2 years of seated adjuvant endocrine therapy without evidence of recurrence.  I discussed with the patient that the study drug is the next generation of therapies for hormone positive breast  cancer, discussed that these drugs have been highly studied and some of these agents already been approved by the FDA for use in advance breast cancer, particularly those with ESR 1 mutation, which makes some endocrine resistant.  I discussed with the patient briefly the information regarding this clinical trial and provided written information inform of the consent.  Patient will review and call if interested in order to have baseline studies and randomization.    B mammogram July 2023 BIRADS-2, next due in July of 2024.     Recommend to have colonoscopy sooner due to new BM changes.     F/u in 6 months.     Osteopenia: Mildly decreased. Repeat DEXA due in July 2025.  Continue weight bearing exercise, calcium and vitamin D.       MDM moderate risk.      No orders of the defined types were placed in this encounter.      Results From Past 48 Hours:  No results found for this or any previous visit (from the past 48 hour(s)).      Imaging & Referrals:  None

## 2024-04-17 ENCOUNTER — LAB ENCOUNTER (OUTPATIENT)
Dept: LAB | Facility: HOSPITAL | Age: 69
End: 2024-04-17
Attending: INTERNAL MEDICINE
Payer: MEDICARE

## 2024-04-17 DIAGNOSIS — M85.80 OSTEOPENIA, UNSPECIFIED LOCATION: ICD-10-CM

## 2024-04-17 DIAGNOSIS — M79.642 BILATERAL HAND PAIN: ICD-10-CM

## 2024-04-17 DIAGNOSIS — M79.641 BILATERAL HAND PAIN: ICD-10-CM

## 2024-04-17 DIAGNOSIS — Z51.81 MEDICATION MONITORING ENCOUNTER: ICD-10-CM

## 2024-04-17 LAB
ANION GAP SERPL CALC-SCNC: 6 MMOL/L (ref 0–18)
BUN BLD-MCNC: 12 MG/DL (ref 9–23)
BUN/CREAT SERPL: 15.2 (ref 10–20)
CALCIUM BLD-MCNC: 9.9 MG/DL (ref 8.7–10.4)
CHLORIDE SERPL-SCNC: 110 MMOL/L (ref 98–112)
CO2 SERPL-SCNC: 28 MMOL/L (ref 21–32)
CREAT BLD-MCNC: 0.79 MG/DL
EGFRCR SERPLBLD CKD-EPI 2021: 81 ML/MIN/1.73M2 (ref 60–?)
FASTING STATUS PATIENT QL REPORTED: YES
GLUCOSE BLD-MCNC: 94 MG/DL (ref 70–99)
OSMOLALITY SERPL CALC.SUM OF ELEC: 298 MOSM/KG (ref 275–295)
POTASSIUM SERPL-SCNC: 4.3 MMOL/L (ref 3.5–5.1)
SODIUM SERPL-SCNC: 144 MMOL/L (ref 136–145)

## 2024-04-17 PROCEDURE — 36415 COLL VENOUS BLD VENIPUNCTURE: CPT

## 2024-04-17 PROCEDURE — 80048 BASIC METABOLIC PNL TOTAL CA: CPT

## 2024-04-18 ENCOUNTER — NURSE ONLY (OUTPATIENT)
Dept: RHEUMATOLOGY | Facility: CLINIC | Age: 69
End: 2024-04-18

## 2024-04-18 DIAGNOSIS — M81.0 AGE-RELATED OSTEOPOROSIS WITHOUT CURRENT PATHOLOGICAL FRACTURE: Primary | ICD-10-CM

## 2024-04-18 PROCEDURE — 96372 THER/PROPH/DIAG INJ SC/IM: CPT | Performed by: INTERNAL MEDICINE

## 2024-04-18 NOTE — PROGRESS NOTES
Patient came to office for 6 month follow up Prolia injection. Labs reviewed and WNL. Name and  verified. Patient aware she/he is to receive Prolia injection. Injection given subcutaneous in left upper arm, patient tolerated injection well. Patient given reminder note for 6 month follow up and lab order to complete. Patient agreeable with plan and will call office with any questions or concerns. Educational materials provided.

## 2024-05-02 RX ORDER — HYDROCHLOROTHIAZIDE 25 MG/1
TABLET ORAL
Qty: 90 TABLET | Refills: 3 | Status: SHIPPED | OUTPATIENT
Start: 2024-05-02

## 2024-05-02 NOTE — TELEPHONE ENCOUNTER
Please review. Protocol Failed; No Protocol    Requested Prescriptions   Pending Prescriptions Disp Refills    HYDROCHLOROTHIAZIDE 25 MG Oral Tab [Pharmacy Med Name: Hydrochlorothiazide 25 Mg Tab Teva] 90 tablet 0     Sig: TAKE ONE TABLET BY MOUTH DAILY AS NEEDED       Hypertension Medications Protocol Failed - 5/1/2024  9:57 AM        Failed - Last BP reading less than 140/90     BP Readings from Last 1 Encounters:   03/11/24 148/74               Passed - CMP or BMP in past 12 months        Passed - In person appointment or virtual visit in the past 12 mos or appointment in next 3 mos     Recent Outpatient Visits              2 weeks ago Age-related osteoporosis without current pathological fracture    [facility]    Nurse Only    1 month ago Malignant neoplasm of upper-outer quadrant of right breast in female, estrogen receptor positive (HCC)    [facility] Hematology Oncology Edilia Craig MD    Office Visit    4 months ago Osteopenia, unspecified location    [facility] Sinai Garcia MD    Office Visit    5 months ago Encounter for annual health examination    [facility] Gwendolyn Khanna MD    Office Visit    6 months ago Osteopenia, unspecified location    [facility]    Nurse Only          Future Appointments         Provider Department Appt Notes    In 2 months [facility]3 [facility] Mammography - [facility]     In 4 months Edilia Craig MD [facility] Hematology Oncology 6 month f/u.cl    In 5 months [facility] RN RHEUMATOLOGY [facility] Prolia    In 6 months Gwendolyn Khanna MD [facility] AWV last 11/15/23    In 7 months Sinai Garcia MD [facility] follow up                    Passed - EGFRCR  or GFRNAA > 50     GFR Evaluation  EGFRCR: 81 , resulted on 4/17/2024                 Future Appointments         Provider Department Appt Notes    In 2 months [facility] DHARMESH RM3 [facility] Mammography - [facility]     In 4 months Edilia Craig MD [facility] Hematology Oncology 6 month f/u.cl    In 5 months EC [facility] RN RHEUMATOLOGY [facility] Prolia    In 6 months Gwendolyn Khanna MD [facility] AWV last 11/15/23    In 7 months Sinai Garcia MD [facility] follow up          Recent Outpatient Visits              2 weeks ago Age-related osteoporosis without current pathological fracture    [facility]    Nurse Only    1 month ago Malignant neoplasm of upper-outer quadrant of right breast in female, estrogen receptor positive (HCC)    [facility] Hematology Oncology Edilia Craig MD    Office Visit    4 months ago Osteopenia, unspecified location    [facility] Sinai Garcia MD    Office Visit    5 months ago Encounter for annual health examination    [facility] Gwendolyn Khanna MD    Office Visit    6 months ago Osteopenia, unspecified location    [facility]    Nurse Only

## 2024-07-05 RX ORDER — LETROZOLE 2.5 MG/1
2.5 TABLET, FILM COATED ORAL DAILY
Qty: 90 TABLET | Refills: 3 | Status: SHIPPED | OUTPATIENT
Start: 2024-07-05

## 2024-08-20 ENCOUNTER — HOSPITAL ENCOUNTER (OUTPATIENT)
Dept: MAMMOGRAPHY | Facility: HOSPITAL | Age: 69
Discharge: HOME | End: 2024-08-20
Attending: INTERNAL MEDICINE
Payer: MEDICARE

## 2024-08-20 DIAGNOSIS — Z12.31 SCREENING MAMMOGRAM, ENCOUNTER FOR: ICD-10-CM

## 2024-08-20 PROCEDURE — 77063 BREAST TOMOSYNTHESIS BI: CPT | Performed by: INTERNAL MEDICINE

## 2024-08-20 PROCEDURE — 77067 SCR MAMMO BI INCL CAD: CPT | Performed by: INTERNAL MEDICINE

## 2024-09-03 DIAGNOSIS — M79.605 LEFT LEG PAIN: ICD-10-CM

## 2024-09-05 RX ORDER — MELOXICAM 7.5 MG/1
7.5 TABLET ORAL DAILY
Qty: 30 TABLET | Refills: 2 | Status: SHIPPED | OUTPATIENT
Start: 2024-09-05

## 2024-09-05 NOTE — TELEPHONE ENCOUNTER
Refill passed per [facility] protocols.    Requested Prescriptions   Pending Prescriptions Disp Refills    Meloxicam 7.5 MG Oral Tab 30 tablet 2     Sig: Take 1 tablet (7.5 mg total) by mouth daily.       Non-Narcotic Pain Medication Protocol Passed - 9/3/2024 10:40 AM        Passed - In person appointment or virtual visit in the past 6 mos or appointment in next 3 mos     Recent Outpatient Visits              4 months ago Age-related osteoporosis without current pathological fracture    [facility]    Nurse Only    5 months ago Malignant neoplasm of upper-outer quadrant of right breast in female, estrogen receptor positive (HCC)    [facility] Hematology Oncology Edilia Craig MD    Office Visit    8 months ago Osteopenia, unspecified location    [facility] Sinai Garcia MD    Office Visit    9 months ago Encounter for annual health examination    [facility] Gwendolyn Khanna MD    Office Visit    10 months ago Osteopenia, unspecified location    [facility]    Nurse Only          Future Appointments         Provider Department Appt Notes    In 1 week Edilia Craig MD [facility] Hematology Oncology 6 month f/u.cl    In 1 month [facility] RN RHEUMATOLOGY [facility] Prolia    In 2 months Gwendolyn Khanna MD [facility] AWV last 11/15/23    In 3 months Sinai Garcia MD [facility] follow up

## 2024-09-18 ENCOUNTER — OFFICE VISIT (OUTPATIENT)
Dept: HEMATOLOGY/ONCOLOGY | Facility: HOSPITAL | Age: 69
End: 2024-09-18
Attending: INTERNAL MEDICINE
Payer: MEDICARE

## 2024-09-18 VITALS
WEIGHT: 124.19 LBS | BODY MASS INDEX: 22.85 KG/M2 | TEMPERATURE: 98 F | SYSTOLIC BLOOD PRESSURE: 144 MMHG | HEIGHT: 62 IN | DIASTOLIC BLOOD PRESSURE: 78 MMHG | HEART RATE: 83 BPM | RESPIRATION RATE: 16 BRPM | OXYGEN SATURATION: 99 %

## 2024-09-18 DIAGNOSIS — Z12.31 SCREENING MAMMOGRAM, ENCOUNTER FOR: ICD-10-CM

## 2024-09-18 DIAGNOSIS — Z85.3 ENCOUNTER FOR FOLLOW-UP SURVEILLANCE OF BREAST CANCER: ICD-10-CM

## 2024-09-18 DIAGNOSIS — Z51.81 ENCOUNTER FOR MONITORING AROMATASE INHIBITOR THERAPY: ICD-10-CM

## 2024-09-18 DIAGNOSIS — C50.411 MALIGNANT NEOPLASM OF UPPER-OUTER QUADRANT OF RIGHT BREAST IN FEMALE, ESTROGEN RECEPTOR POSITIVE (HCC): Primary | ICD-10-CM

## 2024-09-18 DIAGNOSIS — M85.80 OSTEOPENIA AFTER MENOPAUSE: ICD-10-CM

## 2024-09-18 DIAGNOSIS — T45.1X5A AROMATASE INHIBITOR-ASSOCIATED ARTHRALGIA: ICD-10-CM

## 2024-09-18 DIAGNOSIS — Z78.0 OSTEOPENIA AFTER MENOPAUSE: ICD-10-CM

## 2024-09-18 DIAGNOSIS — Z17.0 MALIGNANT NEOPLASM OF UPPER-OUTER QUADRANT OF RIGHT BREAST IN FEMALE, ESTROGEN RECEPTOR POSITIVE (HCC): Primary | ICD-10-CM

## 2024-09-18 DIAGNOSIS — M25.50 AROMATASE INHIBITOR-ASSOCIATED ARTHRALGIA: ICD-10-CM

## 2024-09-18 DIAGNOSIS — Z08 ENCOUNTER FOR FOLLOW-UP SURVEILLANCE OF BREAST CANCER: ICD-10-CM

## 2024-09-18 DIAGNOSIS — Z79.811 ENCOUNTER FOR MONITORING AROMATASE INHIBITOR THERAPY: ICD-10-CM

## 2024-09-18 PROCEDURE — 99214 OFFICE O/P EST MOD 30 MIN: CPT | Performed by: INTERNAL MEDICINE

## 2024-09-18 PROCEDURE — G2211 COMPLEX E/M VISIT ADD ON: HCPCS | Performed by: INTERNAL MEDICINE

## 2024-09-18 NOTE — PROGRESS NOTES
HPI     Serenity Willams is a 69 year old female who is here today for follow-up of diagnosis of   Encounter Diagnoses   Name Primary?    Malignant neoplasm of upper-outer quadrant of right breast in female, estrogen receptor positive (HCC) Yes    Encounter for monitoring aromatase inhibitor therapy [Z51.81, Z79.811 (ICD-10-CM)]     Encounter for follow-up surveillance of breast cancer     Aromatase inhibitor-associated arthralgia     Screening mammogram, encounter for     Osteopenia after menopause         Compliance with SBE: Yes. Changes on SBE: No.   Compliance with letrozole:  compliance all of the time  Side effects from letrozole: No hot flashes, Yes arthralgias or myalgias at hands.  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 Went to rheumatology.  Takes meloxicam as needed. States hand exercises in the morning do help.      Good ROM on the R arm, some numbness on the R axilla at site of scar.  No pain.  No swelling.    Taking 3g of flax seed oil and states rarely hot flashes.      Using the treadmill for 30 minutes a day.    ECOG PS 0    Review of Systems:   Review of Systems   Constitutional:  Negative for appetite change, fatigue and unexpected weight change.   Respiratory:  Negative for cough and shortness of breath.    Cardiovascular:  Negative for chest pain.   Gastrointestinal:  Negative for abdominal pain (states at times from some foods, if spicy foods).   Endocrine: Negative for hot flashes (resolved with flax seed oil.).   Musculoskeletal:  Positive for arthralgias. Negative for back pain (sometimes LBP with sciatica) and neck pain (sometimes in am).   Neurological:  Positive for headaches (on/off in the am.). Negative for dizziness.   Hematological:  Negative for adenopathy. Bruises/bleeds easily.   Psychiatric/Behavioral:  Positive for sleep disturbance.          Meds:  Current Outpatient Medications   Medication Sig Dispense Refill    Meloxicam 7.5 MG Oral Tab Take 1 tablet (7.5 mg total) by mouth daily.  30 tablet 2    letrozole 2.5 MG Oral Tab Take 1 tablet (2.5 mg total) by mouth daily. 90 tablet 3    hydroCHLOROthiazide 25 MG Oral Tab TAKE ONE TABLET BY MOUTH DAILY AS NEEDED 90 tablet 3    atorvastatin 20 MG Oral Tab Take 1 tablet (20 mg total) by mouth daily. 90 tablet 3    tolterodine 1 MG Oral Tab Take 1 tablet (1 mg total) by mouth 2 (two) times daily. 180 tablet 0    mupirocin 2 % External Ointment Apply 1 Application topically daily as needed. To the affected area 22 g 0    Calcium Carbonate Antacid 600 MG Oral Chew Tab Chew 1,200 mg by mouth.      Flaxseed, Linseed, (FLAX SEED OIL) 1000 MG Oral Cap Take by mouth daily.      Turmeric 500 MG Oral Tab Take 1 tablet by mouth daily.      Coenzyme Q10 100 MG Oral Cap Take 100 mg by mouth daily.      Vitamin C 500 MG Oral Tab Take 1 tablet (500 mg total) by mouth daily.      omega-3 fatty acids (FISH OIL) 1000 MG Oral Cap Take  by mouth.      Multiple Vitamin (MULTI-VITAMINS) Oral Tab Take  by mouth.      Garlic 1000 MG Oral Cap Take  by mouth.      cetirizine (ZYRTEC) 10 MG Oral Tab Take 1 tablet (10 mg total) by mouth nightly.       Allergies:   Allergies   Allergen Reactions    Demerol Hcl [Meperidine] NAUSEA AND VOMITING    Macrobid [Nitrofurantoin] RASH    Latex      Other reaction(s): LATEX       Past Medical History:    Breast cancer (HCC)    Chicken pox    Headache    High blood pressure    High cholesterol    Hyperlipidemia    Meniere disease    Osteopenia    PONV (postoperative nausea and vomiting)    PPD positive     Past Surgical History:   Procedure Laterality Date    Cholecystectomy      lap cholecystectomy    Colonoscopy N/A 12/04/2017    Procedure: COLONOSCOPY;  Surgeon: Donavon Martin MD;  Location: [facility] ENDOSCOPY    Colonoscopy N/A 12/15/2022    Procedure: COLONOSCOPY;  Surgeon: Donavon Martin MD;  Location: [facility] ENDO    Electrocardiogram, complete  12/11/2005    Scanned to media tab     Lumpectomy right Right 10/08/2019    Right  lumepctomy w SLNB    Other surgical history      Injection Tendon Sheath, Ligament    Other surgical history Left 2012    Bone spur removal    Radiation right  2019     Social History     Socioeconomic History    Marital status:    Occupational History    Occupation: retired HD RN   Tobacco Use    Smoking status: Never    Smokeless tobacco: Never   Vaping Use    Vaping status: Never Used   Substance and Sexual Activity    Alcohol use: No    Drug use: No    Sexual activity: Not Currently   Other Topics Concern    Caffeine Concern No       Family History   Problem Relation Age of Onset    Breast Cancer Sister 32        2nd dx @ 70, contralateral    Other (gastro-intestinal cancer) Father 83        d.84; non-smoker    Other (gastric cancer) Brother 63        fmr smoker    Cancer Paternal Aunt         cervical ca    Ovarian Cancer Paternal Aunt     Stroke Mother     Breast Cancer Sister 73    Prostate Cancer Brother 58    Breast Cancer Self 63         PHYSICAL EXAM:    /78 (BP Location: Left arm, Patient Position: Sitting, Cuff Size: adult)   Pulse 83   Temp 98 °F (36.7 °C) (Oral)   Resp 16   Ht 1.575 m (5' 2\")   Wt 56.3 kg (124 lb 3.2 oz)   LMP 10/03/1995 (Approximate)   SpO2 99%   BMI 22.72 kg/m²   Wt Readings from Last 6 Encounters:   09/18/24 56.3 kg (124 lb 3.2 oz)   03/11/24 55.6 kg (122 lb 8 oz)   12/19/23 53.1 kg (117 lb)   11/15/23 54.4 kg (120 lb)   08/24/23 57.6 kg (127 lb)   06/01/23 61.2 kg (135 lb)     General: Patient is alert, not in acute distress.  HEENT: EOMs intact. PERRL.  Neck: No JVD. No palpable lymphadenopathy. Neck is supple.  Chest: Clear to auscultation.   Breasts: R breast with surgical scar and axillary scar w/o masses, L breast w/o masses.  Heart: Regular rate and rhythm.   Abdomen: Soft, non tender with good bowel sounds.  Extremities: No edema.  Neurological: Grossly intact.   Lymphatics: There is no palpable lymphadenopathy throughout in the cervical,  supraclavicular, axillary, or inguinal regions.  Psych/Depression: nl      ASSESSMENT/PLAN:     Encounter Diagnoses   Name Primary?    Malignant neoplasm of upper-outer quadrant of right breast in female, estrogen receptor positive (HCC) Yes    Encounter for monitoring aromatase inhibitor therapy [Z51.81, Z79.811 (ICD-10-CM)]     Encounter for follow-up surveillance of breast cancer     Aromatase inhibitor-associated arthralgia     Screening mammogram, encounter for     Osteopenia after menopause         Cancer Staging  Malignant neoplasm of upper-outer quadrant of right breast in female, estrogen receptor positive (HCC)  Staging form: Breast, AJCC 8th Edition  - Clinical stage from 9/6/2019: Stage IA (cT1c, cN0(f), cM0, G3, ER+, AK+, HER2-) - Signed by Edilia Craig MD on 9/11/2019  - Pathologic stage from 10/30/2019: Stage IA (pT1c, pN0(sn), cM0, G3, ER+, AK+, HER2-, Oncotype DX score: 18) - Signed by Edilia Craig MD on 10/30/2019  Luminal B, [id number] 1+    Serenity Willams is a 69 year old female with ER/AK positive breast cancer, node negative.    Patient status post lumpectomy, and status post radiation therapy.    Oncotype score low risk or recurrence, with Oncotype DX score of 18.   No additional benefit from adjuvant chemotherapy.      On adjuvant hormonal therapy with letrozole.  Patient is to complete total of 5 years of adjuvant hormonal therapy in January 2025. D/w patient that guidelines now recommend extended therapy consideration for up to 8-10 yrs.  Will send BCI to determine if benefit past 5 yrs of treatment.    Had discussed YAMILKA 1 and declined.     B mammogram August 22, 2024 BIRADS-2, next due in August of 2025.    Recommend to have colonoscopy sooner due to new BM changes.     F/u in 6 months.     Osteopenia: Mildly decreased. Repeat DEXA due in July 2025.  Continue weight bearing exercise, calcium and vitamin D.       MDM moderate risk.  Continuity of complex medical care.    No  orders of the defined types were placed in this encounter.      Results From Past 48 Hours:  No results found for this or any previous visit (from the past 48 hour(s)).      Imaging & Referrals:  None     PROCEDURE: [facility] VIJAYA 2D+3D SCREENING BILAT ([id number])     COMPARISON: [facility], [facility] VIJAYA 2D+3D DIAGNOSTIC DHARMESH BILAT ([id number]), 7/12/2021, 1:46 PM.  [facility], [facility] VIJAYA 2D+3D DIAGNOSTIC DHARMESH RIGHT ([id number]), 1/17/2022, 2:28 PM.  [facility], [facility] VIJAYA 2D+3D DIAGNOSTIC DHARMESH BILAT ([id number]), 7/18/2022, 8:07 AM.  [facility], [facility] VIJAYA 2D+3D SCREENING BILAT ([id number]), 7/20/2023, 2:19 PM.     INDICATIONS: Screening mammogram, encounter for     TECHNIQUE: Full field direct screening mammography was performed and images were reviewed with the [employer] AKIRA 1.5.1.5 CAD device.  3D tomosynthesis was performed and reviewed        BREAST COMPOSITION:   Category c-Heterogeneously dense, which may obscure small masses.        FINDINGS: There are stable benign postsurgical changes in the right breast.  There are benign calcifications in the left breast.  The parenchyma pattern is stable with no new suspicious asymmetry, mass, architectural distortion, or microcalcifications  identified in either breast.                 Impression  CONCLUSION:   Benign findings.  No mammographic evidence for breast malignancy.  As long as the patient's clinical breast exam remains unchanged, annual screening mammogram is recommended.     BI-RADS CATEGORY:    DIAGNOSTIC CATEGORY 2--BENIGN FINDING:       RECOMMENDATIONS:  ROUTINE MAMMOGRAM AND CLINICAL EVALUATION IN 12 MONTHS.                   PLEASE NOTE: NORMAL MAMMOGRAM DOES NOT EXCLUDE THE POSSIBILITY OF BREAST CANCER.  A CLINICALLY SUSPICIOUS PALPABLE LUMP SHOULD BE BIOPSIED.       For patients over the age of 40, the target due date for the patient's next mammogram has  been entered into a reminder system.       Patient received a discharge summary from the technologist after completion of exam.     Breast marker legend used on images    Triangle = Palpable lump  [place] = Skin tag or mole  BB = Nipple  Linear maegan = Scar  Square = Pain           Dictated by (CST): Addison Self MD on 8/22/2024 at 9:34 AM      Finalized by (CST): Addison Self MD on 8/22/2024 at 9:36 AM

## 2024-09-23 ENCOUNTER — TELEPHONE (OUTPATIENT)
Dept: HEMATOLOGY/ONCOLOGY | Facility: HOSPITAL | Age: 69
End: 2024-09-23

## 2024-09-24 ENCOUNTER — LAB REQUISITION (OUTPATIENT)
Dept: LAB | Facility: HOSPITAL | Age: 69
End: 2024-09-24
Payer: MEDICARE

## 2024-09-24 DIAGNOSIS — C50.411 MALIGNANT NEOPLASM OF UPPER-OUTER QUADRANT OF RIGHT FEMALE BREAST (HCC): ICD-10-CM

## 2024-09-24 DIAGNOSIS — Z17.0 ESTROGEN RECEPTOR POSITIVE STATUS (ER+): ICD-10-CM

## 2024-09-24 PROCEDURE — 88363 XM ARCHIVE TISSUE MOLEC ANAL: CPT | Performed by: PATHOLOGY

## 2024-10-14 RX ORDER — ATORVASTATIN CALCIUM 20 MG/1
20 TABLET, FILM COATED ORAL DAILY
Qty: 90 TABLET | Refills: 3 | Status: SHIPPED | OUTPATIENT
Start: 2024-10-14

## 2024-10-14 NOTE — TELEPHONE ENCOUNTER
Refill Per Protocol     Requested Prescriptions   Pending Prescriptions Disp Refills    ATORVASTATIN 20 MG Oral Tab [Pharmacy Med Name: Atorvastatin Calcium 20 Mg Tab Nort] 90 tablet 0     Sig: Take 1 tablet (20 mg total) by mouth daily.       Cholesterol Medication Protocol Passed - 10/14/2024  8:36 AM        Passed - ALT < 80     Lab Results   Component Value Date    ALT 29 11/15/2023             Passed - ALT resulted within past year        Passed - Lipid panel within past 12 months     Lab Results   Component Value Date    CHOLEST 155 11/15/2023    TRIG 92 11/15/2023    HDL 72 (H) 11/15/2023    LDL 66 11/15/2023    VLDL 14 11/15/2023    NONHDLC 83 11/15/2023             Passed - In person appointment or virtual visit in the past 12 mos or appointment in next 3 mos     Recent Outpatient Visits              3 weeks ago Malignant neoplasm of upper-outer quadrant of right breast in female, estrogen receptor positive (HCC)    [facility] Hematology Oncology Edilia Craig MD    Office Visit    5 months ago Age-related osteoporosis without current pathological fracture    [facility]    Nurse Only    7 months ago Malignant neoplasm of upper-outer quadrant of right breast in female, estrogen receptor positive ([facility])    [facility] Hematology Oncology Edilia Craig MD    Office Visit    10 months ago Osteopenia, unspecified location    [facility] Sinai Garcia MD    Office Visit    11 months ago Encounter for annual health examination    [facility] Gwendolyn Khanna MD    Office Visit          Future Appointments         Provider Department Appt Notes    In 1 week  [facility]ANE[facility] RN RHEUMATOLOGY [facility] Prolia    In 3 weeks Gwendolyn Khanna MD [facility] AWV last 11/15/23    In 2 months Sinai Garcia MD  [facility] follow up    In 5 months Edilia Craig MD [facility] Hematology Oncology 6 month f/u.cl                           Future Appointments         Provider Department Appt Notes    In 1 week [facility] RN RHEUMATOLOGY [facility] Prolia    In 3 weeks Gwendolyn Khanna MD [facility] AWV last 11/15/23    In 2 months Sinai Garcia MD [facility] follow up    In 5 months Edilia Craig MD [facility] Hematology Oncology 6 month f/u.cl          Recent Outpatient Visits              3 weeks ago Malignant neoplasm of upper-outer quadrant of right breast in female, estrogen receptor positive (HCC)    [facility] Hematology Oncology Edilia Craig MD    Office Visit    5 months ago Age-related osteoporosis without current pathological fracture    [facility]    Nurse Only    7 months ago Malignant neoplasm of upper-outer quadrant of right breast in female, estrogen receptor positive (HCC)    [facility] Hematology Oncology Edilia Craig MD    Office Visit    10 months ago Osteopenia, unspecified location    [facility] Sinai Garcia MD    Office Visit    11 months ago Encounter for annual health examination    [facility] Gwendolyn Khanna MD    Office Visit

## 2024-10-21 ENCOUNTER — LAB ENCOUNTER (OUTPATIENT)
Dept: LAB | Facility: HOSPITAL | Age: 69
End: 2024-10-21
Attending: INTERNAL MEDICINE
Payer: MEDICARE

## 2024-10-21 DIAGNOSIS — M85.80 OSTEOPENIA, UNSPECIFIED LOCATION: ICD-10-CM

## 2024-10-21 DIAGNOSIS — M79.641 BILATERAL HAND PAIN: ICD-10-CM

## 2024-10-21 DIAGNOSIS — M79.642 BILATERAL HAND PAIN: ICD-10-CM

## 2024-10-21 DIAGNOSIS — Z51.81 MEDICATION MONITORING ENCOUNTER: ICD-10-CM

## 2024-10-21 LAB
ANION GAP SERPL CALC-SCNC: 8 MMOL/L (ref 0–18)
BUN BLD-MCNC: 14 MG/DL (ref 9–23)
BUN/CREAT SERPL: 17.9 (ref 10–20)
CALCIUM BLD-MCNC: 9.8 MG/DL (ref 8.7–10.4)
CHLORIDE SERPL-SCNC: 108 MMOL/L (ref 98–112)
CO2 SERPL-SCNC: 27 MMOL/L (ref 21–32)
CREAT BLD-MCNC: 0.78 MG/DL
EGFRCR SERPLBLD CKD-EPI 2021: 82 ML/MIN/1.73M2 (ref 60–?)
FASTING STATUS PATIENT QL REPORTED: YES
GLUCOSE BLD-MCNC: 98 MG/DL (ref 70–99)
OSMOLALITY SERPL CALC.SUM OF ELEC: 296 MOSM/KG (ref 275–295)
POTASSIUM SERPL-SCNC: 4.2 MMOL/L (ref 3.5–5.1)
SODIUM SERPL-SCNC: 143 MMOL/L (ref 136–145)

## 2024-10-21 PROCEDURE — 36415 COLL VENOUS BLD VENIPUNCTURE: CPT

## 2024-10-21 PROCEDURE — 80048 BASIC METABOLIC PNL TOTAL CA: CPT

## 2024-10-22 ENCOUNTER — NURSE ONLY (OUTPATIENT)
Dept: RHEUMATOLOGY | Facility: CLINIC | Age: 69
End: 2024-10-22

## 2024-10-22 DIAGNOSIS — M81.0 AGE-RELATED OSTEOPOROSIS WITHOUT CURRENT PATHOLOGICAL FRACTURE: Primary | ICD-10-CM

## 2024-10-22 PROCEDURE — 96372 THER/PROPH/DIAG INJ SC/IM: CPT | Performed by: INTERNAL MEDICINE

## 2024-10-22 NOTE — PROGRESS NOTES
Patient came to office for 6 month follow up Prolia injection. Labs reviewed and WNL. Name and  verified. Patient aware she is to receive Prolia injection. Injection given on left upper arm SQ, patient tolerated injection well. Patient given reminder note for 6 month follow up and lab order to complete. Patient agreeable with plan and will call office with any questions or concerns.     Calcium, Total  8.7 - 10.4 mg/dL 9.8     Future Appointments   Date Time Provider Department Center   2024  9:00 AM Gwendolyn Khanna MD [facility]   2024  8:40 AM Sinai Garcia MD [facility]   3/19/2025  9:30 AM Edilia Craig MD [facility] HEM ONC EMO   2025  9:00 AM [facility] RN RHEUMATOLOGY [facility]   2025 11:00 AM CF DEXA RM1 CF DEXA EM [facility]   2025 11:20 AM CF DHARMESH RM3 [facility]

## 2024-11-05 ENCOUNTER — TELEPHONE (OUTPATIENT)
Dept: INTERNAL MEDICINE CLINIC | Facility: CLINIC | Age: 69
End: 2024-11-05

## 2024-11-05 RX ORDER — MELOXICAM 7.5 MG/1
7.5 TABLET ORAL
COMMUNITY

## 2024-11-05 NOTE — TELEPHONE ENCOUNTER
COMPREHENSIVE MEDICATION REVIEW         Serenity Willams MRN [id number]    1955 PCP Gwendolyn Khanna MD     Comments: Medication history completed by Ambulatory Clinic Pharmacist over the phone on 24. Patient has upcoming AWV with PCP on 24.     After thorough medication review, 5 discrepancies have been identified and corrected on patient's medication list. See updated list below:     Outpatient Encounter Medications as of 2024   Medication Sig    denosumab 60 MG/ML Subcutaneous Solution Prefilled Syringe Inject 1 mL (60 mg total) into the skin every 6 (six) months.    Meloxicam 7.5 MG Oral Tab Take 1 tablet (7.5 mg total) by mouth daily as needed for Pain.    atorvastatin 20 MG Oral Tab Take 1 tablet (20 mg total) by mouth daily.    letrozole 2.5 MG Oral Tab Take 1 tablet (2.5 mg total) by mouth daily.    hydroCHLOROthiazide 25 MG Oral Tab TAKE ONE TABLET BY MOUTH DAILY AS NEEDED    Calcium Carbonate Antacid 600 MG Oral Chew Tab Chew 1,200 mg by mouth daily.    Flaxseed, Linseed, (FLAX SEED OIL) 1000 MG Oral Cap Take 1 capsule by mouth daily.    Turmeric 500 MG Oral Tab Take 1 tablet by mouth daily.    Coenzyme Q10 100 MG Oral Cap Take 100 mg by mouth daily.    Vitamin C 500 MG Oral Tab Take 1 tablet (500 mg total) by mouth daily.    omega-3 fatty acids (FISH OIL) 1000 MG Oral Cap Take 1,000 mg by mouth daily.    Multiple Vitamin (MULTI-VITAMINS) Oral Tab Take 1 tablet by mouth daily.    Garlic 1000 MG Oral Cap Take 1 capsule by mouth daily.    cetirizine (ZYRTEC) 10 MG Oral Tab Take 1 tablet (10 mg total) by mouth 3 (three) times a week.     Medication Assessment:   Reviewed all medications in detail with patient including dose, indication, timing of administration, monitoring parameters, and potential side effects of medications.     Patient reports taking atorvastatin 20 mg daily, hydrochlorothiazide 25 mg daily as needed and letrozole 2.5 mg daily as prescribed. She is also getting  Prolia injections every 6 months. Did provide education and stressed the importance of taking medication just like prescribed to get the most benefit. Patient denies forgetting or missing medication doses and denies any questions or concerns with medications at this time.     Thank you,    Corinne Cleveland, PharmD, 11/5/2024, 1:17 PM

## 2024-11-06 ENCOUNTER — OFFICE VISIT (OUTPATIENT)
Dept: INTERNAL MEDICINE CLINIC | Facility: CLINIC | Age: 69
End: 2024-11-06

## 2024-11-06 VITALS
HEIGHT: 62 IN | HEART RATE: 78 BPM | WEIGHT: 123.81 LBS | SYSTOLIC BLOOD PRESSURE: 130 MMHG | DIASTOLIC BLOOD PRESSURE: 82 MMHG | BODY MASS INDEX: 22.78 KG/M2

## 2024-11-06 DIAGNOSIS — N39.46 MIXED STRESS AND URGE URINARY INCONTINENCE: ICD-10-CM

## 2024-11-06 DIAGNOSIS — Z51.81 ENCOUNTER FOR MONITORING AROMATASE INHIBITOR THERAPY: ICD-10-CM

## 2024-11-06 DIAGNOSIS — R73.03 PREDIABETES: ICD-10-CM

## 2024-11-06 DIAGNOSIS — J34.3 HYPERTROPHY OF NASAL TURBINATES: ICD-10-CM

## 2024-11-06 DIAGNOSIS — Z00.00 ENCOUNTER FOR ANNUAL HEALTH EXAMINATION: Primary | ICD-10-CM

## 2024-11-06 DIAGNOSIS — J34.89 SINUS PRESSURE: ICD-10-CM

## 2024-11-06 DIAGNOSIS — M26.609 TMJ (TEMPOROMANDIBULAR JOINT SYNDROME): Chronic | ICD-10-CM

## 2024-11-06 DIAGNOSIS — E55.9 VITAMIN D DEFICIENCY: ICD-10-CM

## 2024-11-06 DIAGNOSIS — M48.062 SPINAL STENOSIS OF LUMBAR REGION WITH NEUROGENIC CLAUDICATION: ICD-10-CM

## 2024-11-06 DIAGNOSIS — J30.1 ALLERGIC RHINITIS DUE TO POLLEN, UNSPECIFIED SEASONALITY: ICD-10-CM

## 2024-11-06 DIAGNOSIS — Z79.811 ENCOUNTER FOR MONITORING AROMATASE INHIBITOR THERAPY: ICD-10-CM

## 2024-11-06 DIAGNOSIS — C50.411 MALIGNANT NEOPLASM OF UPPER-OUTER QUADRANT OF RIGHT BREAST IN FEMALE, ESTROGEN RECEPTOR POSITIVE (HCC): ICD-10-CM

## 2024-11-06 DIAGNOSIS — E78.2 MIXED HYPERLIPIDEMIA: ICD-10-CM

## 2024-11-06 DIAGNOSIS — I70.0 ATHEROSCLEROSIS OF AORTA (HCC): ICD-10-CM

## 2024-11-06 DIAGNOSIS — I10 ESSENTIAL HYPERTENSION: ICD-10-CM

## 2024-11-06 DIAGNOSIS — Z80.3 FAMILY HISTORY OF BREAST CANCER IN FIRST DEGREE RELATIVE: ICD-10-CM

## 2024-11-06 DIAGNOSIS — D69.2 SENILE PURPURA (HCC): ICD-10-CM

## 2024-11-06 DIAGNOSIS — H93.19 SUBJECTIVE TINNITUS, UNSPECIFIED LATERALITY: ICD-10-CM

## 2024-11-06 DIAGNOSIS — R76.11 NONSPECIFIC REACTION TO TUBERCULIN SKIN TEST: ICD-10-CM

## 2024-11-06 DIAGNOSIS — M81.0 AGE-RELATED OSTEOPOROSIS WITHOUT CURRENT PATHOLOGICAL FRACTURE: ICD-10-CM

## 2024-11-06 DIAGNOSIS — Z17.0 MALIGNANT NEOPLASM OF UPPER-OUTER QUADRANT OF RIGHT BREAST IN FEMALE, ESTROGEN RECEPTOR POSITIVE (HCC): ICD-10-CM

## 2024-11-06 DIAGNOSIS — M54.16 LUMBAR RADICULOPATHY: ICD-10-CM

## 2024-11-06 RX ORDER — AMOXICILLIN 875 MG/1
875 TABLET, COATED ORAL 2 TIMES DAILY
Qty: 14 TABLET | Refills: 0 | Status: SHIPPED | OUTPATIENT
Start: 2024-11-06 | End: 2024-11-13

## 2024-11-06 NOTE — PROGRESS NOTES
Subjective:   Serenity Willams is a 69 year old female who presents for a Medicare Subsequent Annual Wellness visit (Pt already had Initial Annual Wellness) and scheduled follow up of multiple significant but stable problems.   Pt comes for her medicare physical   Overall doing well but has some chronic hip paun       HISTORY  Saw the dentist in June for some problems with her molar and was given antibiotics and then was later told she needed a root canal and then had later found out it needs to be extracted and after having it extracted had implant placed and had so much pain was not able to eat and lost weight from 128 to 115  Bruises easily and did speak to her oncologist about that and was told it may be due to the medications as well  Tolterodine and gabapentin on hold per patient gabapentin makes her edgy--     HISTORY  11/2022  Saw dermatology and got her Prolia shot in October        HISTORY     Saw rheum and on risedronate and now for prolia   Sciatica better  In 2002 she injured herself at work and needed PT with traction etc   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 Shah who retired  C/C establish care  C/o has very bad sciatica   Has been on OP meds x over 5 yrs , did havea drug holiday in between   Noted hypothyroidism was on problem list but she has never been on any medications and she has never had any thyroid issues  Has been having 3-4 BM daily for a little less than 6 mns and was rec to f/u gi per her hemeonc   Has had genetic testing done for the breast cancer   Has UI and has tried detrol in the past and would like to retry it -- doesn't feel like anything is coming down   C/o mult jnt pains            PMH  HL  HTN  Vit d def   OP on risedronate x over 5 yrs and now on prolia   Borderline dm   UI- on tolteradine   Right breast cancer - invasive ductal carcinoma s/p  lumpectomy and LN dissection and RT , no chemo and on letrozole -sees dr tylor Ruth - on gabapentin -sees dr johnson   First degree relative with breast cancer - sister x 2   Chronic left ear tinnitus     Lives with  , 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      Cognitive Assessment:   Abnormal  What day of the week is this?: Correct  What month is it?: Correct  What year is it?: Correct  Recall \"Ball\": Incorrect  Recall \"Flag\": Correct  Recall \"Tree\": Correct    Functional Ability/Status:   Serenity Willams has a completely normal functional assessment. See flowsheet for details.        Depression Screening (PHQ):  PHQ-2 SCORE: 0  , done 11/6/2024   If you checked off any problems, how difficult have these problems made it for you to do your work, take care of things at home, or get along with other people?: Not difficult at all    Last [place] Suicide Screening on 11/6/2024 was No Risk.          Advanced Directives:   She does NOT have a Living Will. [Do you have a living will?: No]  She does NOT have a Power of  for Health Care. [Do you have a healthcare power of ?: No]  Discussed Advance Care Planning with patient (and family/surrogate if present). Standard forms made available to patient in After Visit Summary.      Patient Active Problem List   Diagnosis    Family history of breast cancer in first degree relative    Allergic rhinitis due to pollen    Mixed hyperlipidemia    Hypertrophy of nasal turbinates    Nonspecific reaction to tuberculin skin test    Osteoporosis    Subjective tinnitus    Vitamin D deficiency    Malignant neoplasm of upper-outer quadrant of right breast in female, estrogen receptor positive (HCC)    TMJ (temporomandibular joint syndrome)    Encounter for monitoring  aromatase inhibitor therapy    Essential hypertension    Lumbar radiculopathy    Spinal stenosis of lumbar region with neurogenic claudication    Mixed stress and urge urinary incontinence    Prediabetes    Senile purpura (HCC)    Atherosclerosis of aorta (HCC)     Allergies:  She is allergic to demerol hcl [meperidine], macrobid [nitrofurantoin], and latex.    Current Medications:  Outpatient Medications Marked as Taking for the 11/6/24 encounter (Office Visit) with Gwendolyn Khanna MD   Medication Sig    amoxicillin 875 MG Oral Tab Take 1 tablet (875 mg total) by mouth 2 (two) times daily for 7 days.    denosumab 60 MG/ML Subcutaneous Solution Prefilled Syringe Inject 1 mL (60 mg total) into the skin every 6 (six) months.    Meloxicam 7.5 MG Oral Tab Take 1 tablet (7.5 mg total) by mouth daily as needed for Pain.    atorvastatin 20 MG Oral Tab Take 1 tablet (20 mg total) by mouth daily.    letrozole 2.5 MG Oral Tab Take 1 tablet (2.5 mg total) by mouth daily.    hydroCHLOROthiazide 25 MG Oral Tab TAKE ONE TABLET BY MOUTH DAILY AS NEEDED    Calcium Carbonate Antacid 600 MG Oral Chew Tab Chew 1,200 mg by mouth daily.    Flaxseed, Linseed, (FLAX SEED OIL) 1000 MG Oral Cap Take 1 capsule by mouth daily.    Turmeric 500 MG Oral Tab Take 1 tablet by mouth daily.    Coenzyme Q10 100 MG Oral Cap Take 100 mg by mouth daily.    Vitamin C 500 MG Oral Tab Take 1 tablet (500 mg total) by mouth daily.    omega-3 fatty acids (FISH OIL) 1000 MG Oral Cap Take 1,000 mg by mouth daily.    Multiple Vitamin (MULTI-VITAMINS) Oral Tab Take 1 tablet by mouth daily.    Garlic 1000 MG Oral Cap Take 1 capsule by mouth daily.    cetirizine (ZYRTEC) 10 MG Oral Tab Take 1 tablet (10 mg total) by mouth 3 (three) times a week.       Medical History:  She  has a past medical history of Breast cancer (HCC), Chicken pox, Headache, High blood pressure, High cholesterol, Hyperlipidemia, Meniere disease, Osteopenia, PONV (postoperative nausea and  vomiting), and PPD positive.  Surgical History:  She  has a past surgical history that includes electrocardiogram, complete (12/11/2005); colonoscopy (N/A, 12/04/2017); cholecystectomy; lumpectomy right (Right, 10/08/2019); radiation right (2019); other surgical history; other surgical history (Left, 2012); and colonoscopy (N/A, 12/15/2022).   Family History:  Her family history includes Breast Cancer (age of onset: 32) in her sister; Breast Cancer (age of onset: 63) in her self; Breast Cancer (age of onset: 73) in her sister; Cancer in her paternal aunt; Ovarian Cancer in her paternal aunt; Prostate Cancer (age of onset: 58) in her brother; Stroke in her mother; gastric cancer (age of onset: 63) in her brother; gastro-intestinal cancer (age of onset: 83) in her father.  Social History:  She  reports that she has never smoked. She has never used smokeless tobacco. She reports that she does not drink alcohol and does not use drugs.    Tobacco:  She has never smoked tobacco.    CAGE Alcohol Screen:   CAGE screening score of 0 on 11/6/2024, showing low risk of alcohol abuse.      Patient Care Team:  Gwendolyn Khanna MD as PCP - General (Internal Medicine)  Cristo Ellison MD (Radiation Oncology)  Edilia Craig MD (Hematology and Oncology)  Анна Chávez MD (Surgical Oncology)  Kiki Umaña MD (Radiation Oncology)  Swapnil Wilburn MD (Physical Medicine)  Shyla Johnson, PT (Physical Therapy)  Loraine Sanchez PT as Physical Therapist (Physical Therapy)  Hannah Francis MD (OBSTETRICS & GYNECOLOGY)    Review of Systems  GENERAL: feels well otherwise  SKIN: denies any unusual skin lesions  EYES: denies blurred vision or double vision  HEENT: denies nasal congestion, sinus pain or ST-stable   LUNGS: denies shortness of breath with exertion  CARDIOVASCULAR: denies chest pain on exertion  GI: denies abdominal pain, denies heartburn  : denies dysuria, vaginal discharge or itching, + complaint of urinary  incontinence   MUSCULOSKELETAL: denies back pain  NEURO: denies headaches  PSYCHE: denies depression or anxiety  HEMATOLOGIC: denies hx of anemia  ENDOCRINE: denies thyroid history  ALL/ASTHMA: denies hx of allergy or asthma    Objective:   Physical Exam  GENERAL: well developed, well nourished,in no apparent distress  SKIN: no rashes,no suspicious lesions  HEENT: atraumatic, normocephalic,ears and throat are clear, pupils equal and reactive to light bilaterally, extraocular muscles intact  NECK: supple,no adenopathy, nontender   LUNGS: clear to auscultation, no wheeze  CARDIO: RRR without murmur  GI: good BS's,no masses or tenderness  EXTREMITIES: no cyanosis, or edema  Breast exam done by dr snider       /82   Pulse 78   Ht 5' 2\" (1.575 m)   Wt 123 lb 12.8 oz (56.2 kg)   LMP 10/03/1995 (Approximate)   BMI 22.64 kg/m²  Estimated body mass index is 22.64 kg/m² as calculated from the following:    Height as of this encounter: 5' 2\" (1.575 m).    Weight as of this encounter: 123 lb 12.8 oz (56.2 kg).    Medicare Hearing Assessment:   Hearing Screening    Screening Method: Questionnaire  I have a problem hearing over the telephone: No I have trouble following the conversations when two or more people are talking at the same time: No   I have trouble understanding things on the TV: No I have to strain to understand conversations: No   I have to worry about missing the telephone ring or doorbell: No I have trouble hearing conversations in a noisy background such as a crowded room or restaurant: No   I get confused about where sounds come from: No I misunderstand some words in a sentence and need to ask people to repeat themselves: No   I especially have trouble understanding the speech of women and children: No I have trouble understanding the speaker in a large room such as at a meeting or place of [employer]: No   Many people I talk to seem to mumble (or don't speak clearly): No People get annoyed because I  misunderstand what they say: No   I misunderstand what others are saying and make inappropriate responses: No I avoid social activities because I cannot hear well and fear I will reply improperly: No   Family members and friends have told me they think I may have hearing loss: No                   Assessment & Plan:   Serenity Willams is a 69 year old female who presents for a Medicare Assessment.     1. Encounter for annual health examination (Primary)  -     Comp Metabolic Panel (14); Future; Expected date: 11/06/2024  -     Hemoglobin A1C; Future; Expected date: 11/06/2024  -     Lipid Panel; Future; Expected date: 11/06/2024  -     TSH W Reflex To Free T4; Future; Expected date: 11/06/2024  -     Vitamin D; Future; Expected date: 11/06/2024  -     CBC, Platelet; No Differential; Future; Expected date: 11/06/2024  Advised patient to watch what she eats and exercise, seatbelt use and no texting driving, sunscreen use advised   2. Family history of breast cancer in first degree relative  Stable   3. Malignant neoplasm of upper-outer quadrant of right breast in female, estrogen receptor positive (HCC)  Sees [facility]   4. Encounter for monitoring aromatase inhibitor therapy  Stable   5. Senile purpura (HCC)  Stable   6. Atherosclerosis of aorta (HCC)  Overview:  ON CXR 2019  7. Mixed hyperlipidemia  -     Comp Metabolic Panel (14); Future; Expected date: 11/06/2024  -     Hemoglobin A1C; Future; Expected date: 11/06/2024  -     Lipid Panel; Future; Expected date: 11/06/2024  -     TSH W Reflex To Free T4; Future; Expected date: 11/06/2024  -     Vitamin D; Future; Expected date: 11/06/2024  -     CBC, Platelet; No Differential; Future; Expected date: 11/06/2024  Follow low-fat low-cholesterol diet and exercise 30 minutes a day as tolerated   8. Essential hypertension  -     Comp Metabolic Panel (14); Future; Expected date: 11/06/2024  -     Hemoglobin A1C; Future; Expected date: 11/06/2024  -     Lipid Panel;  Future; Expected date: 11/06/2024  -     TSH W Reflex To Free T4; Future; Expected date: 11/06/2024  -     Vitamin D; Future; Expected date: 11/06/2024  -     CBC, Platelet; No Differential; Future; Expected date: 11/06/2024  Advised pt to follow a low salt , low sodium (including fast foods and processed foods), can look up DASH diet, exercise 30 min a day , monitor bp     9. Age-related osteoporosis without current pathological fracture  On prolia   10. Vitamin D deficiency  -     Vitamin D; Future; Expected date: 11/06/2024  Recheck   11. Prediabetes  -     Hemoglobin A1C; Future; Expected date: 11/06/2024  Advised patient to follow a low sugar and low carb diet and exercise 30 minutes a day, monitor, recheck   12. Lumbar radiculopathy  -     Comp Metabolic Panel (14); Future; Expected date: 11/06/2024  -     Hemoglobin A1C; Future; Expected date: 11/06/2024  -     Lipid Panel; Future; Expected date: 11/06/2024  -     TSH W Reflex To Free T4; Future; Expected date: 11/06/2024  -     Vitamin D; Future; Expected date: 11/06/2024  -     CBC, Platelet; No Differential; Future; Expected date: 11/06/2024  13. Spinal stenosis of lumbar region with neurogenic claudication  Stable   14. Mixed stress and urge urinary incontinence  Stable   15. Allergic rhinitis due to pollen, unspecified seasonality  Stable   16. Hypertrophy of nasal turbinates  Stable   17. Subjective tinnitus, unspecified laterality  Stable   18. Sinus pressure  -     Amoxicillin; Take 1 tablet (875 mg total) by mouth 2 (two) times daily for 7 days.  Dispense: 14 tablet; Refill: 0  Abx given if symot worsen   19. TMJ (temporomandibular joint syndrome)  Stable   20. Nonspecific reaction to tuberculin skin test  Stable               Preventive medicine  DEXA scan July 2021 AND 7/2023   Mammogram 9/2024  Colonoscopy- stathopolus  12/2022 AND RPT IN 10 YRS IF PT WANTS   Pap and pelvic exam done by Dr. Francis,1/2023  Labs 11/2023            The patient  indicates understanding of these issues and agrees to the plan.  Reinforced healthy diet, lifestyle, and exercise.      No follow-ups on file.     Gwendolyn Khanna MD, 11/6/2024     Supplementary Documentation:   General Health:  In the past six months, have you lost more than 10 pounds without trying?: 2 - No  Has your appetite been poor?: No  Type of Diet: Balanced  How does the patient maintain a good energy level?: Appropriate Exercise;Stretching  How would you describe your daily physical activity?: Moderate  How would you describe your current health state?: Good  How do you maintain positive mental well-being?: Social Interaction  On a scale of 0 to 10, with 0 being no pain and 10 being severe pain, what is your pain level?: 1 - (Mild)  In the past six months, have you experienced urine leakage?: 0-No  At any time do you feel concerned for the safety/well-being of yourself and/or your children, in your home or elsewhere?: No  Have you had any immunizations at another office such as Influenza, Hepatitis B, Tetanus, or Pneumococcal?: No    Health Maintenance   Topic Date Due    HTN: BP Follow-Up  01/19/2024    COVID-19 Vaccine (6 - 2023-24 season) 09/01/2024    Annual Physical  11/15/2024    Zoster Vaccines (2 of 2) 12/06/2024 (Originally 8/17/2021)    Mammogram  08/20/2025    Colorectal Cancer Screening  12/15/2032    Influenza Vaccine  Completed    DEXA Scan  Completed    Annual Depression Screening  Completed    Fall Risk Screening (Annual)  Completed    Pneumococcal Vaccine: 65+ Years  Completed

## 2024-11-09 ENCOUNTER — LAB ENCOUNTER (OUTPATIENT)
Dept: LAB | Facility: HOSPITAL | Age: 69
End: 2024-11-09
Attending: INTERNAL MEDICINE
Payer: MEDICARE

## 2024-11-09 DIAGNOSIS — E78.2 MIXED HYPERLIPIDEMIA: ICD-10-CM

## 2024-11-09 DIAGNOSIS — Z00.00 ENCOUNTER FOR ANNUAL HEALTH EXAMINATION: ICD-10-CM

## 2024-11-09 DIAGNOSIS — R73.03 PREDIABETES: ICD-10-CM

## 2024-11-09 DIAGNOSIS — E55.9 VITAMIN D DEFICIENCY: ICD-10-CM

## 2024-11-09 DIAGNOSIS — I10 ESSENTIAL HYPERTENSION: ICD-10-CM

## 2024-11-09 DIAGNOSIS — M54.16 LUMBAR RADICULOPATHY: ICD-10-CM

## 2024-11-09 LAB
ALBUMIN SERPL-MCNC: 5 G/DL (ref 3.2–4.8)
ALBUMIN/GLOB SERPL: 1.6 {RATIO} (ref 1–2)
ALP LIVER SERPL-CCNC: 65 U/L
ALT SERPL-CCNC: 35 U/L
ANION GAP SERPL CALC-SCNC: 9 MMOL/L (ref 0–18)
AST SERPL-CCNC: 34 U/L (ref ?–34)
BILIRUB SERPL-MCNC: 1.1 MG/DL (ref 0.2–1.1)
BUN BLD-MCNC: 16 MG/DL (ref 9–23)
BUN/CREAT SERPL: 19.5 (ref 10–20)
CALCIUM BLD-MCNC: 10.1 MG/DL (ref 8.7–10.4)
CHLORIDE SERPL-SCNC: 107 MMOL/L (ref 98–112)
CHOLEST SERPL-MCNC: 170 MG/DL (ref ?–200)
CO2 SERPL-SCNC: 26 MMOL/L (ref 21–32)
CREAT BLD-MCNC: 0.82 MG/DL
DEPRECATED RDW RBC AUTO: 45.9 FL (ref 35.1–46.3)
EGFRCR SERPLBLD CKD-EPI 2021: 77 ML/MIN/1.73M2 (ref 60–?)
ERYTHROCYTE [DISTWIDTH] IN BLOOD BY AUTOMATED COUNT: 13.2 % (ref 11–15)
EST. AVERAGE GLUCOSE BLD GHB EST-MCNC: 120 MG/DL (ref 68–126)
FASTING PATIENT LIPID ANSWER: YES
FASTING STATUS PATIENT QL REPORTED: YES
GLOBULIN PLAS-MCNC: 3.2 G/DL (ref 2–3.5)
GLUCOSE BLD-MCNC: 95 MG/DL (ref 70–99)
HBA1C MFR BLD: 5.8 % (ref ?–5.7)
HCT VFR BLD AUTO: 43.1 %
HDLC SERPL-MCNC: 75 MG/DL (ref 40–59)
HGB BLD-MCNC: 14.1 G/DL
LDLC SERPL CALC-MCNC: 76 MG/DL (ref ?–100)
MCH RBC QN AUTO: 31 PG (ref 26–34)
MCHC RBC AUTO-ENTMCNC: 32.7 G/DL (ref 31–37)
MCV RBC AUTO: 94.7 FL
NONHDLC SERPL-MCNC: 95 MG/DL (ref ?–130)
OSMOLALITY SERPL CALC.SUM OF ELEC: 295 MOSM/KG (ref 275–295)
PLATELET # BLD AUTO: 217 10(3)UL (ref 150–450)
POTASSIUM SERPL-SCNC: 3.7 MMOL/L (ref 3.5–5.1)
PROT SERPL-MCNC: 8.2 G/DL (ref 5.7–8.2)
RBC # BLD AUTO: 4.55 X10(6)UL
SODIUM SERPL-SCNC: 142 MMOL/L (ref 136–145)
TRIGL SERPL-MCNC: 106 MG/DL (ref 30–149)
TSI SER-ACNC: 0.73 UIU/ML (ref 0.55–4.78)
VIT D+METAB SERPL-MCNC: 69.6 NG/ML (ref 30–100)
VLDLC SERPL CALC-MCNC: 16 MG/DL (ref 0–30)
WBC # BLD AUTO: 5.7 X10(3) UL (ref 4–11)

## 2024-11-09 PROCEDURE — 80053 COMPREHEN METABOLIC PANEL: CPT

## 2024-11-09 PROCEDURE — 83036 HEMOGLOBIN GLYCOSYLATED A1C: CPT

## 2024-11-09 PROCEDURE — 82306 VITAMIN D 25 HYDROXY: CPT

## 2024-11-09 PROCEDURE — 80061 LIPID PANEL: CPT

## 2024-11-09 PROCEDURE — 36415 COLL VENOUS BLD VENIPUNCTURE: CPT

## 2024-11-09 PROCEDURE — 85027 COMPLETE CBC AUTOMATED: CPT

## 2024-11-09 PROCEDURE — 84443 ASSAY THYROID STIM HORMONE: CPT

## 2024-12-18 ENCOUNTER — OFFICE VISIT (OUTPATIENT)
Dept: RHEUMATOLOGY | Facility: CLINIC | Age: 69
End: 2024-12-18

## 2024-12-18 VITALS
HEIGHT: 62 IN | SYSTOLIC BLOOD PRESSURE: 127 MMHG | WEIGHT: 123 LBS | HEART RATE: 94 BPM | BODY MASS INDEX: 22.63 KG/M2 | DIASTOLIC BLOOD PRESSURE: 83 MMHG

## 2024-12-18 DIAGNOSIS — Z51.81 MEDICATION MONITORING ENCOUNTER: ICD-10-CM

## 2024-12-18 DIAGNOSIS — M79.642 BILATERAL HAND PAIN: ICD-10-CM

## 2024-12-18 DIAGNOSIS — M81.0 AGE-RELATED OSTEOPOROSIS WITHOUT CURRENT PATHOLOGICAL FRACTURE: Primary | ICD-10-CM

## 2024-12-18 DIAGNOSIS — M79.641 BILATERAL HAND PAIN: ICD-10-CM

## 2024-12-18 PROCEDURE — G2211 COMPLEX E/M VISIT ADD ON: HCPCS | Performed by: INTERNAL MEDICINE

## 2024-12-18 PROCEDURE — 99214 OFFICE O/P EST MOD 30 MIN: CPT | Performed by: INTERNAL MEDICINE

## 2024-12-18 NOTE — PATIENT INSTRUCTIONS
You were seen today for osteoporosis  Continue Prolia every 6 months  For your October injection make sure to see me  Get blood work 1 week before every Prolia injection

## 2024-12-18 NOTE — PROGRESS NOTES
Serenity Willams is a 69 year old female.    HPI:     Chief Complaint   Patient presents with    Follow - Up     Osteopenia        I had the pleasure of seeing Serenity Willams on 12/18/2024 for follow up Osteopenia     Current Medications:  Prolia every 6 mos- first dose 10/12/2022, 4/17/2023, 10/18/2023, 4/2024, 10/2024  Ca 1200 mg daily and Vitamin D 5000 units daily   Previous medications:  Risendronate  Blood work:  +LUDIN 1:160 homogenous, Neg JESSICA panel  Neg ESR, CRP, RF, CCP  Bone Density 7/2021--> 7/2023  LTN T-score     -2.1          -2.2  LTH                  -1.9          -2.1  LS                     0.4           0.8    Interval History:  This is a 68 yo F with hx of HTN, HLD, GERD, Breast cancer s/p lumpectomy/radiaton dx 2019 (on Letrozole), Osteopenia, Chronic LBP (s/p NICHOLAS x2) presents for joint pain and +LUDIN.  Reports bilateral hand pain and stiffness that started about a month ago.  When she wakes up she has to open and close her hands to loosen them up.  They feel very stiff in the morning.  She is noted some changes in her right middle and pinky finger.  No swelling in her fingers.  Hand is worse than left hand.  She has some numbness and tingling but not significant.  She also reports neck pain that started last week.  Most in her right side.  Denies any shooting pain or numbness and tingling going down her arms.  Denies any other joint pain, swelling, psoriasis, lower back pain or GI issues.  She also reports a history of osteopiña for many years.  She has been on risedronate for over 20 years.  Initially was on Fosamax.  Most recent bone density in July 2021 showed a left femoral neck T score -2.1, left total hip -1.9 and lower spine 0.4.  She states that she was put on a drug holiday around 2016 but not sure for how long.  She started menopause in her early 40s.  No history of fragility fractures.  No hormone replacement therapy.  She takes calcium 1200 mg daily and vitamin D 5000  units daily.  She exercises daily.  She also had radiation for her breast cancer.    12/19/2023:  Presents for f/u of Osteopenia  On Prolia every 6 mos, has had 3 injections  Also takes calcium and vitamin D  Also has b/l hand pain but takes meloxicam as needed which helps  No other joint pain  Staying active, treadmill for 30 minutes in the morning   No recent fractures     12/18/2024:  Presents for f/u of Osteopenia  On Prolia every 6 mos  No recent fall or fractures  She also has generalized OA and takes meloxicam as needed for pain  Staying active, treadmill for 30 minutes in the morning     HISTORY:  Past Medical History:    Breast cancer (HCC)    Chicken pox    Headache    High blood pressure    High cholesterol    Hyperlipidemia    Meniere disease    Osteopenia    PONV (postoperative nausea and vomiting)    PPD positive      Social Hx Reviewed   Family Hx Reviewed     Medications (Active prior to today's visit):  Current Outpatient Medications   Medication Sig Dispense Refill    denosumab 60 MG/ML Subcutaneous Solution Prefilled Syringe Inject 1 mL (60 mg total) into the skin every 6 (six) months.      Meloxicam 7.5 MG Oral Tab Take 1 tablet (7.5 mg total) by mouth daily as needed for Pain.      atorvastatin 20 MG Oral Tab Take 1 tablet (20 mg total) by mouth daily. 90 tablet 3    letrozole 2.5 MG Oral Tab Take 1 tablet (2.5 mg total) by mouth daily. 90 tablet 3    hydroCHLOROthiazide 25 MG Oral Tab TAKE ONE TABLET BY MOUTH DAILY AS NEEDED 90 tablet 3    Calcium Carbonate Antacid 600 MG Oral Chew Tab Chew 1,200 mg by mouth daily.      Flaxseed, Linseed, (FLAX SEED OIL) 1000 MG Oral Cap Take 1 capsule by mouth daily.      Turmeric 500 MG Oral Tab Take 1 tablet by mouth daily.      Coenzyme Q10 100 MG Oral Cap Take 100 mg by mouth daily.      Vitamin C 500 MG Oral Tab Take 1 tablet (500 mg total) by mouth daily.      omega-3 fatty acids (FISH OIL) 1000 MG Oral Cap Take 1,000 mg by mouth daily.      Multiple  Vitamin (MULTI-VITAMINS) Oral Tab Take 1 tablet by mouth daily.      Garlic 1000 MG Oral Cap Take 1 capsule by mouth daily.      cetirizine (ZYRTEC) 10 MG Oral Tab Take 1 tablet (10 mg total) by mouth 3 (three) times a week.       .cmed  Allergies:  Allergies   Allergen Reactions    Demerol Hcl [Meperidine] NAUSEA AND VOMITING    Macrobid [Nitrofurantoin] RASH    Latex      Other reaction(s): LATEX         ROS:   All other ROS are negative.     PHYSICAL EXAM:   GEN: AAOx3, NAD  HEENT: EOMI, PERRLA, no injection or icterus, oral mucosa moist, no oral lesions. No lymphadenopathy. No facial rash  CVS: RRR, no murmurs rubs or gallops. Equal 2+ distal pulses.   LUNGS: CTAB, no increased work of breathing  ABDOMEN:  soft NT/ND, +BS, no HSM  SKIN: No rashes or skin lesions. No nail findings  MSK:  Cervical spine: FROM  Hands: no synovitis in DIP, PIP and MCP, strong full fists  Wrist: FROM, no pain or swelling or warmth on palpation  Elbow: FROM, no pain or swelling or warmth on palpation  Shoulders: FROM, no pain or swelling or warmth on palpation  Hip: normal log roll, no lateral hip pain, GERALDINE test negative b/l  Knees: FROM, no warmth or effusion present. No pain with ROM.   Ankles: FROM, no pain or swelling or warmth on palpation  Feet: no pain with MTP squeeze, no toe swelling or pain or warmth on palpation with FROM  Spine: no lumbar or sacral pain on palpation.  NEURO: Cranial nerves II-XII intact grossly. 5/5 strength throughout in both upper and lower extremities, sensation intact.  PSYCH: normal mood       LABS:       Component      Latest Ref Rng & Units 9/12/2022   LUDIN Titer/Pattern      <80 160 (A)   Reviewed By:       Zane Alarcon M.D.   SED RATE      0 - 30 mm/Hr 17   RHEUMATOID FACTOR      <15 IU/mL <10   LUDIN SCREEN      Negative Positive (A)   C-Citrullinated Peptide IgG AB      0.0 - 6.9 U/mL 1.0   C-REACTIVE PROTEIN      <0.30 mg/dL <0.29   VITAMIN D, 25-OH, TOTAL      30.0 - 100.0 ng/mL 51.1      Imaging:     XR hands:  CONCLUSION: Osteoarthritis.  Negative for erosions.     MRI L-spine:  1. No suspicious marrow replacing and/or enhancing osseous lesions throughout the imaged lumbosacral spine to suggest metastatic disease.       2. Multilevel degenerative changes of the lumbar spine as detailed.  Notable levels as follows:       3. L4-L5:  Moderate to severe multifactorial spinal canal stenosis.  Spinal canal stenosis at this level is exacerbated by a 5 mm medially directed synovial cyst, which arises from the right facet joint and effaces the right lateral margin of the thecal   sac.  This synovial cyst also contributes to moderate right lateral recess stenosis.  Additional mild right neural foraminal stenosis.   4. L5-S1:  Mild-to-moderate right and mild left neural foraminal stenosis.   5. L3-L4:  Mild multifactorial spinal canal stenosis.   6. L2-L3:  Degenerative change with no significant neural compromise.     ASSESSMENT/PLAN:     Osteopenia  - Bone density 7/2023 showing osteopenia in the hip  - Plan to continue Prolia every 6 mos  - repeat bone density 9/2025  - Advised to continue calcium and vitamin D supplements, continue to exercise    Bilateral hand pain 2/2 OA  - No evidence of synovitis or swelling on exam  - Work-up for inflammatory arthritis was negative  - Continue meloxicam as needed for pain     Positive LUDIN  - She has no features consistent with an connective tissue process. Neg JESSICA panel  - Discussed other potential etiologies of +LUDIN including other autoimmune disease (eg. Thyroid disease), diabetes, viral hepatitis, or family history of autoimmune disease.  LUDIN also positive in 5% of healthy population. Positive LUDIN alone is not diagnostic of autoimmune diseases.    Pt will f/u every 6 mos with nurse for Prolia injection and then yearly with tonie Garcia MD  12/19/2023   8:40 AM

## 2025-01-10 ENCOUNTER — NURSE TRIAGE (OUTPATIENT)
Dept: INTERNAL MEDICINE CLINIC | Facility: CLINIC | Age: 70
End: 2025-01-10

## 2025-01-10 ENCOUNTER — TELEMEDICINE (OUTPATIENT)
Dept: INTERNAL MEDICINE CLINIC | Facility: CLINIC | Age: 70
End: 2025-01-10

## 2025-01-10 DIAGNOSIS — U07.1 COVID-19: Primary | ICD-10-CM

## 2025-01-10 LAB — AMB EXT COVID-19 RESULT: DETECTED

## 2025-01-10 PROCEDURE — 99213 OFFICE O/P EST LOW 20 MIN: CPT | Performed by: NURSE PRACTITIONER

## 2025-01-10 NOTE — TELEPHONE ENCOUNTER
Action Requested: Summary for Provider     []  Critical Lab, Recommendations Needed  [] Need Additional Advice  []   FYI    []   Need Orders  [] Need Medications Sent to Pharmacy  []  Other     SUMMARY: HOME COVID TEST positive today ,infection banner updated,CDC isolation guidelines provided, video appointment scheduled today ,advised  emergency room  for shortness of breath or chest pain.        Dry cough and fever  of 103 started last night. Current temperature is 102, and she has not taken any Tylenol.  Requesting PAXLOVID.  Wants to know if it is safe to travel==travel plan :[place]  x 12 days (1/23/25),[facility] x [place] week, [place]     Instructed to discuss it during the video visit.    Future Appointments   Date Time Provider Department Center   1/10/2025  2:20 PM Mandy Rucker APRN [facility]       Reason for call: Infection (COVID +)  Onset: Data Unavailable    Patient scheduled for a video visit.  Patient advised to complete the E-check in [facility] [employer], if active.  Understands to follow the prompts and links to complete the visit.    Patient advised that there may be a co-pay involved with this type of visit.     Patient agreed to proceed, they understand the provider may be calling from a blocked, or unknown phone number on their caller ID and they know to answer the phone.    Best call back:  [phone]      Reason for Disposition   Fever > 101 F (38.3 C) and over 60 years of age    Protocols used: Fever-A-OH

## 2025-01-10 NOTE — PROGRESS NOTES
Virtual Visit Check-In    KRISTI WILLAMS or legal guardian   verbally consents to a Virtual Visit Check-In service on 1/10/2025    Patient understands and accepts financial responsibility for any deductible, co-insurance and/or co-pays associated with this service.    Duration of the service:   Call duration 15 minutes   Video visit     Chief Complaint   Patient presents with    Covid       HPI:    Patient ID: Kristi Willams is a 69 year old female. She presents with a temperature of 99.7, cough, body aches, runny nose and congestion. She tested positive for Covid-19 today. Her symptoms started a few days ago. Last night she developed body aches. She is currently taking tylenol for her symptoms. No chest pain, SOB or fevers. She is vaccinated. She had her vaccine last month.     She is taking letrozole but today is her last day.     ROS    Review of Systems   Constitutional:  Positive for fever.   HENT:  Positive for congestion and rhinorrhea. Negative for ear pain and sore throat.    Respiratory:  Positive for cough. Negative for shortness of breath and wheezing.    Cardiovascular:  Negative for chest pain.   Gastrointestinal:  Negative for abdominal pain, constipation, diarrhea, nausea and vomiting.   Genitourinary: Negative.    Musculoskeletal:  Positive for arthralgias.   Skin: Negative.    Neurological:  Positive for headaches. Negative for dizziness.   Psychiatric/Behavioral: Negative.               Current Outpatient Medications   Medication Sig Dispense Refill    nirmatrelvir-ritonavir 300-100 MG Oral Tablet Therapy Pack Take two nirmatrelvir tablets (300mg) with one ritonavir tablet (100mg) together twice daily for 5 days. 30 tablet 0    denosumab 60 MG/ML Subcutaneous Solution Prefilled Syringe Inject 1 mL (60 mg total) into the skin every 6 (six) months.      Meloxicam 7.5 MG Oral Tab Take 1 tablet (7.5 mg total) by mouth daily as needed for Pain.      atorvastatin 20 MG Oral Tab Take 1  tablet (20 mg total) by mouth daily. 90 tablet 3    letrozole 2.5 MG Oral Tab Take 1 tablet (2.5 mg total) by mouth daily. 90 tablet 3    hydroCHLOROthiazide 25 MG Oral Tab TAKE ONE TABLET BY MOUTH DAILY AS NEEDED 90 tablet 3    Calcium Carbonate Antacid 600 MG Oral Chew Tab Chew 1,200 mg by mouth daily.      Flaxseed, Linseed, (FLAX SEED OIL) 1000 MG Oral Cap Take 1 capsule by mouth daily.      Turmeric 500 MG Oral Tab Take 1 tablet by mouth daily.      Coenzyme Q10 100 MG Oral Cap Take 100 mg by mouth daily.      Vitamin C 500 MG Oral Tab Take 1 tablet (500 mg total) by mouth daily.      omega-3 fatty acids (FISH OIL) 1000 MG Oral Cap Take 1,000 mg by mouth daily.      Multiple Vitamin (MULTI-VITAMINS) Oral Tab Take 1 tablet by mouth daily.      Garlic 1000 MG Oral Cap Take 1 capsule by mouth daily.      cetirizine (ZYRTEC) 10 MG Oral Tab Take 1 tablet (10 mg total) by mouth 3 (three) times a week.         Allergies:Allergies[1]       Current Outpatient Medications:     nirmatrelvir-ritonavir 300-100 MG Oral Tablet Therapy Pack, Take two nirmatrelvir tablets (300mg) with one ritonavir tablet (100mg) together twice daily for 5 days., Disp: 30 tablet, Rfl: 0    denosumab 60 MG/ML Subcutaneous Solution Prefilled Syringe, Inject 1 mL (60 mg total) into the skin every 6 (six) months., Disp: , Rfl:     Meloxicam 7.5 MG Oral Tab, Take 1 tablet (7.5 mg total) by mouth daily as needed for Pain., Disp: , Rfl:     atorvastatin 20 MG Oral Tab, Take 1 tablet (20 mg total) by mouth daily., Disp: 90 tablet, Rfl: 3    letrozole 2.5 MG Oral Tab, Take 1 tablet (2.5 mg total) by mouth daily., Disp: 90 tablet, Rfl: 3    hydroCHLOROthiazide 25 MG Oral Tab, TAKE ONE TABLET BY MOUTH DAILY AS NEEDED, Disp: 90 tablet, Rfl: 3    Calcium Carbonate Antacid 600 MG Oral Chew Tab, Chew 1,200 mg by mouth daily., Disp: , Rfl:     Flaxseed, Linseed, (FLAX SEED OIL) 1000 MG Oral Cap, Take 1 capsule by mouth daily., Disp: , Rfl:     Turmeric 500 MG  Oral Tab, Take 1 tablet by mouth daily., Disp: , Rfl:     Coenzyme Q10 100 MG Oral Cap, Take 100 mg by mouth daily., Disp: , Rfl:     Vitamin C 500 MG Oral Tab, Take 1 tablet (500 mg total) by mouth daily., Disp: , Rfl:     omega-3 fatty acids (FISH OIL) 1000 MG Oral Cap, Take 1,000 mg by mouth daily., Disp: , Rfl:     Multiple Vitamin (MULTI-VITAMINS) Oral Tab, Take 1 tablet by mouth daily., Disp: , Rfl:     Garlic 1000 MG Oral Cap, Take 1 capsule by mouth daily., Disp: , Rfl:     cetirizine (ZYRTEC) 10 MG Oral Tab, Take 1 tablet (10 mg total) by mouth 3 (three) times a week., Disp: , Rfl:     Past Medical History:    Breast cancer (HCC)    Chicken pox    Headache    High blood pressure    High cholesterol    Hyperlipidemia    Meniere disease    Osteopenia    PONV (postoperative nausea and vomiting)    PPD positive         PHYSICAL EXAM:     Vitals signs taken at home if available:    Limited examination for this telephone visit due to the coronavirus emergency    Patient was speaking in complete sentences, no increased work of breathing and very coherent and alert on the phone.  Alert and oriented x 3  Patient was responding to questions appropriately.  Patient did not appear short of breath.    ASSESSMENT/PLAN:     Encounter Diagnosis   Name Primary?    COVID-19 Yes       1. COVID-19  - side effects of paxlovid discussed with patient.  - she is hold garlic and atorvastatin while taking paxlovid.   - GFR 77 11/2024  - ok to continue tylenol as needed for body aches or fevers.  - increase fluid intake  - go to the ER if you develop chest pain or SOB.   - nirmatrelvir-ritonavir 300-100 MG Oral Tablet Therapy Pack; Take two nirmatrelvir tablets (300mg) with one ritonavir tablet (100mg) together twice daily for 5 days.  Dispense: 30 tablet; Refill: 0      Patient reminded to practice good health and safety measures including washing hands, social distancing, covering mouth when coughing/ sneezing, avoid social  meetings/ gatherings in face of this Covid 19 pandemic.    Patient verbalized understanding of plan and all questions answered to the best of my ability.    Patient to call back if any change/ worsening of symptoms.    No orders of the defined types were placed in this encounter.      Meds This Visit:  Requested Prescriptions     Signed Prescriptions Disp Refills    nirmatrelvir-ritonavir 300-100 MG Oral Tablet Therapy Pack 30 tablet 0     Sig: Take two nirmatrelvir tablets (300mg) with one ritonavir tablet (100mg) together twice daily for 5 days.       Imaging & Referrals:  None               Mandy Rucker, APRN  1/10/2025  12:22 PM      Please note that the following visit was completed using two-way, real-time interactive audio and/or video communication.  This has been done in good inga to provide continuity of care in the best interest of the provider-patient relationship, due to the ongoing public health crisis/national emergency and because of restrictions of visitation.  There are limitations of this visit as no physical exam could be performed.  Every conscious effort was taken to allow for sufficient and adequate time.  This billing was spent on reviewing labs, medications, radiology tests and decision making.  Appropriate medical decision-making and tests are ordered as detailed in the plan of care above  ID#[id number]         [1]   Allergies  Allergen Reactions    Demerol Hcl [Meperidine] NAUSEA AND VOMITING    Macrobid [Nitrofurantoin] RASH    Latex      Other reaction(s): LATEX

## 2025-02-14 ENCOUNTER — TELEPHONE (OUTPATIENT)
Dept: INTERNAL MEDICINE CLINIC | Facility: CLINIC | Age: 70
End: 2025-02-14

## 2025-02-14 NOTE — TELEPHONE ENCOUNTER
Call transferred from  line.   Spoke with  Ed (on [facility]), patient's full name and date of birth verified.    Ed stated his call was transferred due to no openings until May.   Patient only wants to see Gwendolyn Khanna, they are requesting an appointment any time after 2/27/25.     Patient not with Ed at the time of call, she has a mild productive cough, but that is not why patient wants appointment.     Follow up appointment scheduled for Thursday 3/6/25 at 12:40 PM.     Per chart review, patient had telemedicine visit 1/10/25 with TAMMY Rucker for Covid.     Last office visit with Dr. Khanna was 11/6/24 for complete physical.

## 2025-03-06 ENCOUNTER — OFFICE VISIT (OUTPATIENT)
Dept: INTERNAL MEDICINE CLINIC | Facility: CLINIC | Age: 70
End: 2025-03-06

## 2025-03-06 VITALS
TEMPERATURE: 97 F | HEIGHT: 62 IN | DIASTOLIC BLOOD PRESSURE: 82 MMHG | WEIGHT: 127 LBS | OXYGEN SATURATION: 100 % | HEART RATE: 94 BPM | SYSTOLIC BLOOD PRESSURE: 128 MMHG | BODY MASS INDEX: 23.37 KG/M2

## 2025-03-06 DIAGNOSIS — R05.8 POST-VIRAL COUGH SYNDROME: Primary | ICD-10-CM

## 2025-03-06 DIAGNOSIS — R74.01 ELEVATED AST (SGOT): ICD-10-CM

## 2025-03-06 DIAGNOSIS — H10.31 ACUTE BACTERIAL CONJUNCTIVITIS OF RIGHT EYE: ICD-10-CM

## 2025-03-06 PROCEDURE — 99214 OFFICE O/P EST MOD 30 MIN: CPT | Performed by: INTERNAL MEDICINE

## 2025-03-06 PROCEDURE — G2211 COMPLEX E/M VISIT ADD ON: HCPCS | Performed by: INTERNAL MEDICINE

## 2025-03-06 RX ORDER — BENZONATATE 100 MG/1
100 CAPSULE ORAL 2 TIMES DAILY PRN
Qty: 20 CAPSULE | Refills: 0 | Status: SHIPPED | OUTPATIENT
Start: 2025-03-06

## 2025-03-06 RX ORDER — METHYLPREDNISOLONE 4 MG/1
4 TABLET ORAL AS DIRECTED
COMMUNITY
Start: 2024-12-17

## 2025-03-06 RX ORDER — ERYTHROMYCIN 5 MG/G
1 OINTMENT OPHTHALMIC NIGHTLY
Qty: 3.5 G | Refills: 0 | Status: SHIPPED | OUTPATIENT
Start: 2025-03-06

## 2025-03-06 NOTE — PROGRESS NOTES
Serenity Willams is a 69 year old female.  Chief Complaint   Patient presents with    Cough     Covid + in January  - worsen 2 weeks ago c/o phlegm     [facility]     25th - discharge/swelling        HPI:   Urgent visit   C/c \"feeling sick \"   C/o   Got home 2/24/2025 after her trip and the next day she had crusty  discharge and cold sore -   Still has cough - started first week of feb  when she was on her cruise then feb 10 took abx bc cough was worse and sputum was brown   Jan 10th before cruise she tested positive for covid and got paxlovid         HISTORY  Saw the dentist in June for some problems with her molar and was given antibiotics and then was later told she needed a root canal and then had later found out it needs to be extracted and after having it extracted had implant placed and had so much pain was not able to eat and lost weight from 128 to 115  Bruises easily and did speak to her oncologist about that and was told it may be due to the medications as well  Tolterodine and gabapentin on hold per patient gabapentin makes her edgy--     HISTORY  11/2022  Saw dermatology and got her Prolia shot in October        HISTORY     Saw rheum and on risedronate and now for prolia   Sciatica better  In 2002 she injured herself at work and needed PT with traction etc   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 Shah who retired  C/C establish care  C/o has very bad sciatica   Has been on OP meds x over 5 yrs , did havea drug holiday in between   Noted hypothyroidism was on problem list but she has never been on any medications and she has never had any thyroid issues  Has been having 3-4 BM daily for a little less than 6 mns and was rec to f/u gi per her hemeonc   Has had genetic testing done for the breast cancer   Has UI and has tried detrol in the past and would like to retry it --  doesn't feel like anything is coming down   C/o mult jnt pains            PMH  HL  HTN  Vit d def   OP on risedronate x over 5 yrs and now on prolia   Borderline dm   UI- on tolteradine   Right breast cancer - invasive ductal carcinoma s/p lumpectomy and LN dissection and RT , no chemo and on letrozole -sees dr tylor Ruth - on gabapentin -sees dr johnson   First degree relative with breast cancer - sister x 2   Chronic left ear tinnitus     Lives with  , 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 3.5 g 0    benzonatate 100 MG Oral Cap Take 1 capsule (100 mg total) by mouth 2 (two) times daily as needed for cough. 20 capsule 0    denosumab 60 MG/ML Subcutaneous Solution Prefilled Syringe Inject 1 mL (60 mg total) into the skin every 6 (six) months.      Meloxicam 7.5 MG Oral Tab Take 1 tablet (7.5 mg total) by mouth daily as needed for Pain.      atorvastatin 20 MG Oral Tab Take 1 tablet (20 mg total) by mouth daily. 90 tablet 3    hydroCHLOROthiazide 25 MG Oral Tab TAKE ONE TABLET BY MOUTH DAILY AS NEEDED 90 tablet 3    Calcium Carbonate Antacid 600 MG Oral Chew Tab Chew 1,200 mg by mouth daily.      Flaxseed, Linseed, (FLAX SEED OIL) 1000 MG Oral Cap Take 1 capsule by mouth daily.      Turmeric 500 MG Oral Tab Take 1 tablet by mouth daily.      Coenzyme Q10 100 MG Oral Cap Take 100 mg by mouth daily.      Vitamin C 500 MG Oral Tab Take 1 tablet (500 mg total) by mouth daily.      omega-3 fatty acids (FISH OIL) 1000 MG Oral Cap Take 1,000 mg by mouth daily.      Multiple Vitamin (MULTI-VITAMINS) Oral Tab Take 1 tablet by mouth daily.      Garlic 1000 MG Oral Cap Take 1 capsule by mouth daily.      cetirizine (ZYRTEC) 10 MG Oral Tab Take 1 tablet (10 mg total) by mouth 3 (three)  times a week.      methylPREDNISolone 4 MG Oral Tablet Therapy Pack Take 1 tablet (4 mg total) by mouth As Directed. (Patient not taking: Reported on 3/6/2025)        Past Medical History:    Breast cancer (HCC)    Chicken pox    Headache    High blood pressure    High cholesterol    Hyperlipidemia    Meniere disease    Osteopenia    PONV (postoperative nausea and vomiting)    PPD positive      Past Surgical History:   Procedure Laterality Date    Cholecystectomy      lap cholecystectomy    Colonoscopy N/A 12/04/2017    Procedure: COLONOSCOPY;  Surgeon: Donavon Martin MD;  Location: [facility] ENDOSCOPY    Colonoscopy N/A 12/15/2022    Procedure: COLONOSCOPY;  Surgeon: Donavon Martin MD;  Location: [facility] ENDO    Electrocardiogram, complete  12/11/2005    Scanned to media tab     Lumpectomy right Right 10/08/2019    Right lumepctomy w SLNB    Other surgical history      Injection Tendon Sheath, Ligament    Other surgical history Left 2012    Bone spur removal    Radiation right  2019      Social History:  Social History     Socioeconomic History    Marital status:    Occupational History    Occupation: retired HD RN   Tobacco Use    Smoking status: Never    Smokeless tobacco: Never   Vaping Use    Vaping status: Never Used   Substance and Sexual Activity    Alcohol use: No    Drug use: No    Sexual activity: Not Currently   Other Topics Concern    Caffeine Concern No        REVIEW OF SYSTEMS:   GENERAL HEALTH: No fevers, chills, sweats, fatigue  VISION: No recent vision problems, blurry vision or double vision, just tearing in the right >left eye   HEENT: No decreased hearing ear pain + nasal congestion no sore throat  RESPIRATORY: denies shortness of breath, + cough, ? wheezing  CARDIOVASCULAR: denies chest pain on exertion, +palpitations, swelling in feet  NEURO: +  headaches , anxiety, depression    EXAM:   /82   Pulse 94   Temp 97.1 °F (36.2 °C)   Ht 5' 2\" (1.575 m)   Wt 127 lb (57.6 kg)    LMP 10/03/1995 (Approximate)   SpO2 100%   BMI 23.23 kg/m²   GENERAL: well developed, well nourished,in no apparent distress  SKIN: no rashes,no suspicious lesions  HEENT: atraumatic, normocephalic,ears and throat are clear, b/l conjunctiva with congestion   NECK: supple  LUNGS: clear to auscultation, no wheeze  CARDIO: RRR without murmur  GI: good BS's,no masses or tenderness  EXTREMITIES: no cyanosis, or edema    ASSESSMENT AND PLAN:   Diagnoses and all orders for this visit:    Post-viral cough syndrome  -     benzonatate 100 MG Oral Cap; Take 1 capsule (100 mg total) by mouth 2 (two) times daily as needed for cough.  Will give tessalon perles   Acute bacterial conjunctivitis of right eye  -     erythromycin 5 MG/GM Ophthalmic Ointment; Apply 1 Application to eye nightly.  Erythromycin oint given , warm compress   Elevated AST (SGOT)  -     AST (SGOT); Future    Recheck with next blood draw                 Preventive medicine  DEXA scan July 2021 AND 7/2023   Mammogram 9/2024  Colonoscopy- stathopolus  12/2022 AND RPT IN 10 YRS IF PT WANTS   Pap and pelvic exam done by Dr. Francis,1/2023  Labs 11/2023      The patient indicates understanding of these issues and agrees to the plan.  No follow-ups on file.

## 2025-03-19 ENCOUNTER — OFFICE VISIT (OUTPATIENT)
Age: 70
End: 2025-03-19
Attending: INTERNAL MEDICINE
Payer: MEDICARE

## 2025-03-19 VITALS
HEART RATE: 89 BPM | BODY MASS INDEX: 22.82 KG/M2 | RESPIRATION RATE: 16 BRPM | HEIGHT: 62 IN | SYSTOLIC BLOOD PRESSURE: 127 MMHG | TEMPERATURE: 98 F | OXYGEN SATURATION: 98 % | DIASTOLIC BLOOD PRESSURE: 85 MMHG | WEIGHT: 124 LBS

## 2025-03-19 DIAGNOSIS — M25.50 AROMATASE INHIBITOR-ASSOCIATED ARTHRALGIA: ICD-10-CM

## 2025-03-19 DIAGNOSIS — Z12.31 SCREENING MAMMOGRAM, ENCOUNTER FOR: ICD-10-CM

## 2025-03-19 DIAGNOSIS — T45.1X5A AROMATASE INHIBITOR-ASSOCIATED ARTHRALGIA: ICD-10-CM

## 2025-03-19 DIAGNOSIS — Z78.0 OSTEOPENIA AFTER MENOPAUSE: ICD-10-CM

## 2025-03-19 DIAGNOSIS — Z17.0 MALIGNANT NEOPLASM OF UPPER-OUTER QUADRANT OF RIGHT BREAST IN FEMALE, ESTROGEN RECEPTOR POSITIVE (HCC): Primary | ICD-10-CM

## 2025-03-19 DIAGNOSIS — Z08 ENCOUNTER FOR FOLLOW-UP SURVEILLANCE OF BREAST CANCER: ICD-10-CM

## 2025-03-19 DIAGNOSIS — Z85.3 ENCOUNTER FOR FOLLOW-UP SURVEILLANCE OF BREAST CANCER: ICD-10-CM

## 2025-03-19 DIAGNOSIS — C50.411 MALIGNANT NEOPLASM OF UPPER-OUTER QUADRANT OF RIGHT BREAST IN FEMALE, ESTROGEN RECEPTOR POSITIVE (HCC): Primary | ICD-10-CM

## 2025-03-19 DIAGNOSIS — M85.80 OSTEOPENIA AFTER MENOPAUSE: ICD-10-CM

## 2025-03-19 NOTE — PROGRESS NOTES
HPI     Serenity Willams is a 69 year old female who is here today for follow-up of diagnosis of   Encounter Diagnoses   Name Primary?    Malignant neoplasm of upper-outer quadrant of right breast in female, estrogen receptor positive (HCC) Yes    Encounter for monitoring aromatase inhibitor therapy [Z51.81, Z79.811 (ICD-10-CM)]     Encounter for follow-up surveillance of breast cancer     Aromatase inhibitor-associated arthralgia     Screening mammogram, encounter for     Osteopenia after menopause         Compliance with SBE: Yes. Changes on SBE: No.   Compliance with letrozole:  compliance all of the time, completed in January as instructed.  Side effects from letrozole: No hot flashes, Yes arthralgias or myalgias at hands.  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 Went to rheumatology.  Takes meloxicam as needed. States hand exercises in the morning do help.   States since off letrozole has had some improve.     Good ROM on the R arm, some numbness on the R axilla at site of scar.  No pain.  No swelling.    Using the treadmill for 30 minutes a day.    Was recently on vacation on a cruise in [place] and got sick with a URI there.      ECOG PS 0    Review of Systems:   Review of Systems   Constitutional:  Negative for appetite change, fatigue and unexpected weight change.   Respiratory:  Positive for cough (still cough from URI). Negative for shortness of breath.    Cardiovascular:  Negative for chest pain.   Gastrointestinal:  Negative for abdominal pain.   Endocrine: Negative for hot flashes.   Musculoskeletal:  Positive for arthralgias. Negative for back pain and neck pain.   Neurological:  Positive for headaches (on/off in the am.). Negative for dizziness (some if takes water pill).   Hematological:  Negative for adenopathy. Bruises/bleeds easily.   Psychiatric/Behavioral:  Positive for sleep disturbance.          Meds:  Current Outpatient Medications   Medication Sig Dispense Refill    erythromycin 5 MG/GM Ophthalmic  Ointment Apply 1 Application to eye nightly. 3.5 g 0    benzonatate 100 MG Oral Cap Take 1 capsule (100 mg total) by mouth 2 (two) times daily as needed for cough. 20 capsule 0    denosumab 60 MG/ML Subcutaneous Solution Prefilled Syringe Inject 1 mL (60 mg total) into the skin every 6 (six) months.      Meloxicam 7.5 MG Oral Tab Take 1 tablet (7.5 mg total) by mouth daily as needed for Pain.      atorvastatin 20 MG Oral Tab Take 1 tablet (20 mg total) by mouth daily. 90 tablet 3    hydroCHLOROthiazide 25 MG Oral Tab TAKE ONE TABLET BY MOUTH DAILY AS NEEDED 90 tablet 3    Calcium Carbonate Antacid 600 MG Oral Chew Tab Chew 1,200 mg by mouth daily.      Flaxseed, Linseed, (FLAX SEED OIL) 1000 MG Oral Cap Take 1 capsule by mouth daily.      Turmeric 500 MG Oral Tab Take 1 tablet by mouth daily.      Coenzyme Q10 100 MG Oral Cap Take 100 mg by mouth daily.      Vitamin C 500 MG Oral Tab Take 1 tablet (500 mg total) by mouth daily.      omega-3 fatty acids (FISH OIL) 1000 MG Oral Cap Take 1,000 mg by mouth daily.      Multiple Vitamin (MULTI-VITAMINS) Oral Tab Take 1 tablet by mouth daily.      Garlic 1000 MG Oral Cap Take 1 capsule by mouth daily.      cetirizine (ZYRTEC) 10 MG Oral Tab Take 1 tablet (10 mg total) by mouth 3 (three) times a week.       Allergies:   Allergies   Allergen Reactions    Demerol Hcl [Meperidine] NAUSEA AND VOMITING    Macrobid [Nitrofurantoin] RASH    Latex      Other reaction(s): LATEX       Past Medical History:    Breast cancer (HCC)    Chicken pox    Headache    High blood pressure    High cholesterol    Hyperlipidemia    Meniere disease    Osteopenia    PONV (postoperative nausea and vomiting)    PPD positive     Past Surgical History:   Procedure Laterality Date    Cholecystectomy      lap cholecystectomy    Colonoscopy N/A 12/04/2017    Procedure: COLONOSCOPY;  Surgeon: Donavon Martin MD;  Location: [facility] ENDOSCOPY    Colonoscopy N/A 12/15/2022    Procedure: COLONOSCOPY;   Surgeon: Donavon Martin MD;  Location: [facility] ENDO    Electrocardiogram, complete  12/11/2005    Scanned to media tab     Lumpectomy right Right 10/08/2019    Right lumepctomy w SLNB    Other surgical history      Injection Tendon Sheath, Ligament    Other surgical history Left 2012    Bone spur removal    Radiation right  2019     Social History     Socioeconomic History    Marital status:    Occupational History    Occupation: retired HD RN   Tobacco Use    Smoking status: Never    Smokeless tobacco: Never   Vaping Use    Vaping status: Never Used   Substance and Sexual Activity    Alcohol use: No    Drug use: No    Sexual activity: Not Currently   Other Topics Concern    Caffeine Concern No       Family History   Problem Relation Age of Onset    Breast Cancer Sister 32        2nd dx @ 70, contralateral    Other (gastro-intestinal cancer) Father 83        d.84; non-smoker    Other (gastric cancer) Brother 63        fmr smoker    Cancer Paternal Aunt         cervical ca    Ovarian Cancer Paternal Aunt     Stroke Mother     Breast Cancer Sister 73    Prostate Cancer Brother 58    Breast Cancer Self 63         PHYSICAL EXAM:    /85 (BP Location: Left arm, Patient Position: Sitting, Cuff Size: adult)   Pulse 89   Temp 97.8 °F (36.6 °C) (Tympanic)   Resp 16   Ht 1.575 m (5' 2\")   Wt 56.2 kg (124 lb)   LMP 10/03/1995 (Approximate)   SpO2 98%   BMI 22.68 kg/m²   Wt Readings from Last 6 Encounters:   03/19/25 56.2 kg (124 lb)   03/06/25 57.6 kg (127 lb)   12/18/24 55.8 kg (123 lb)   11/06/24 56.2 kg (123 lb 12.8 oz)   09/18/24 56.3 kg (124 lb 3.2 oz)   03/11/24 55.6 kg (122 lb 8 oz)     General: Patient is alert, not in acute distress.  HEENT: EOMs intact. PERRL.  Neck: No JVD. No palpable lymphadenopathy. Neck is supple.  Chest: Clear to auscultation.   Breasts: R breast with surgical scar and axillary scar w/o masses, L breast w/o masses.  Heart: Regular rate and rhythm.   Abdomen: Soft, non  tender with good bowel sounds.  Extremities: No edema.  Neurological: Grossly intact.   Lymphatics: There is no palpable lymphadenopathy throughout in the cervical, supraclavicular, axillary, or inguinal regions.  Psych/Depression: nl      ASSESSMENT/PLAN:     Encounter Diagnoses   Name Primary?    Malignant neoplasm of upper-outer quadrant of right breast in female, estrogen receptor positive (HCC) Yes    Encounter for monitoring aromatase inhibitor therapy [Z51.81, Z79.811 (ICD-10-CM)]     Encounter for follow-up surveillance of breast cancer     Aromatase inhibitor-associated arthralgia     Screening mammogram, encounter for     Osteopenia after menopause         Cancer Staging  Malignant neoplasm of upper-outer quadrant of right breast in female, estrogen receptor positive (HCC)  Staging form: Breast, AJCC 8th Edition  - Clinical stage from 9/6/2019: Stage IA (cT1c, cN0(f), cM0, G3, ER+, IL+, HER2-) - Signed by Edilia Craig MD on 9/11/2019  - Pathologic stage from 10/30/2019: Stage IA (pT1c, pN0(sn), cM0, G3, ER+, IL+, HER2-, Oncotype DX score: 18) - Signed by Edilia Craig MD on 10/30/2019  Luminal B, [id number] 1+    Serenity Willams is a 69 year old female with ER/IL positive breast cancer, node negative.    Patient status post lumpectomy, and status post radiation therapy.    Oncotype score low risk or recurrence, with Oncotype DX score of 18.   No additional benefit from adjuvant chemotherapy.      The Breast Cancer Index score as follows:     Predictive Result:  Am I likely to benefit from extended endocrine therapy:  No  .       Prognostic results:  What is my risk of late distant recurrence (5-10 yrs)     With 5 total years of adjuvant endocrine therapy:  7.1%     With 10 years of adjuvant endocrine therapy:  7.1%  (Trials, those identified by breast cancer index is yes (H/I-High) experience a 58-67% relative risk reduction with extended endocrine therapy).        Based on the above information,  completed treatment in January 2025.      B mammogram August 22, 2024 BIRADS-2, next due in August of 2025.    Recommend to have colonoscopy sooner due to new BM changes.     F/u in 6 months with imaging and then yearly.     Osteopenia: Mildly decreased. Repeat DEXA due in July 2025.  Continue weight bearing exercise, calcium and vitamin D.       MDM low risk.    No orders of the defined types were placed in this encounter.      Results From Past 48 Hours:  No results found for this or any previous visit (from the past 48 hours).      Imaging & Referrals:  None

## 2025-04-11 RX ORDER — MELOXICAM 7.5 MG/1
7.5 TABLET ORAL
Qty: 90 TABLET | Refills: 0 | Status: SHIPPED | OUTPATIENT
Start: 2025-04-11

## 2025-04-15 NOTE — TELEPHONE ENCOUNTER
[facility] department called regarding this Patient and she is scheduled her Right Breast Diagnostic mammogram, was due a few months ago.   With Covid -19 delays the patient is now due for her bilateral mammogram, new orders have been entered for this
DISCHARGE

## 2025-04-16 ENCOUNTER — LAB ENCOUNTER (OUTPATIENT)
Dept: LAB | Facility: HOSPITAL | Age: 70
End: 2025-04-16
Attending: INTERNAL MEDICINE
Payer: MEDICARE

## 2025-04-16 DIAGNOSIS — R74.01 ELEVATED AST (SGOT): ICD-10-CM

## 2025-04-16 DIAGNOSIS — M81.0 AGE-RELATED OSTEOPOROSIS WITHOUT CURRENT PATHOLOGICAL FRACTURE: ICD-10-CM

## 2025-04-16 LAB
ANION GAP SERPL CALC-SCNC: 7 MMOL/L (ref 0–18)
AST SERPL-CCNC: 35 U/L (ref ?–34)
BUN BLD-MCNC: 12 MG/DL (ref 9–23)
BUN/CREAT SERPL: 14.5 (ref 10–20)
CALCIUM BLD-MCNC: 9.1 MG/DL (ref 8.7–10.4)
CHLORIDE SERPL-SCNC: 105 MMOL/L (ref 98–112)
CO2 SERPL-SCNC: 29 MMOL/L (ref 21–32)
CREAT BLD-MCNC: 0.83 MG/DL (ref 0.55–1.02)
EGFRCR SERPLBLD CKD-EPI 2021: 76 ML/MIN/1.73M2 (ref 60–?)
FASTING STATUS PATIENT QL REPORTED: NO
GLUCOSE BLD-MCNC: 114 MG/DL (ref 70–99)
OSMOLALITY SERPL CALC.SUM OF ELEC: 293 MOSM/KG (ref 275–295)
POTASSIUM SERPL-SCNC: 4.1 MMOL/L (ref 3.5–5.1)
SODIUM SERPL-SCNC: 141 MMOL/L (ref 136–145)

## 2025-04-16 PROCEDURE — 36415 COLL VENOUS BLD VENIPUNCTURE: CPT

## 2025-04-16 PROCEDURE — 84450 TRANSFERASE (AST) (SGOT): CPT

## 2025-04-16 PROCEDURE — 80048 BASIC METABOLIC PNL TOTAL CA: CPT

## 2025-04-22 ENCOUNTER — NURSE ONLY (OUTPATIENT)
Age: 70
End: 2025-04-22

## 2025-04-22 DIAGNOSIS — M81.0 AGE-RELATED OSTEOPOROSIS WITHOUT CURRENT PATHOLOGICAL FRACTURE: Primary | ICD-10-CM

## 2025-04-22 PROCEDURE — 96372 THER/PROPH/DIAG INJ SC/IM: CPT | Performed by: INTERNAL MEDICINE

## 2025-04-22 NOTE — PROGRESS NOTES
Patient name and date of birth confirmed. Patient aware they are here today for Prolia injection. Laboratory testing reviewed and verified okay to proceed with injection today. Injection given in left upper arm without any concerns. Patient aware to follow up in 6 months for next injection and aware to get repeat lab testing beforehand.    Calcium:   Lab Results   Component Value Date    CA 9.1 04/16/2025          Future Appointments   Date Time Provider Department Center   5/30/2025  2:00 PM Ritu Colunga MD [facility]   9/5/2025 11:00 AM [facility] DEXA RM1 [facility] DEXA [facility]   9/5/2025 11:20 AM [facility] DHARMESH RM3 [facility] DHARMESH [facility]   9/17/2025 10:00 AM Edilia Craig MD [facility]   10/22/2025  9:00 AM Sinai Garcia MD [facility]   11/6/2025  9:00 AM Gwendolyn Khanna MD [facility]

## 2025-04-30 NOTE — TELEPHONE ENCOUNTER
Refill passed per [facility] protocol.    Dr. Khanna please kindly advise if patient should continue the same dose of hydrochlorothiazide 25 mg? Thank you    Requested Prescriptions     Pending Prescriptions Disp Refills    HYDROCHLOROTHIAZIDE 25 MG Oral Tab [Pharmacy Med Name: Hydrochlorothiazide 25 Mg Tab Teva] 90 tablet 0     Sig: TAKE ONE TABLET BY MOUTH DAILY AS NEEDED

## 2025-05-01 ENCOUNTER — OFFICE VISIT (OUTPATIENT)
Dept: DERMATOLOGY CLINIC | Facility: CLINIC | Age: 70
End: 2025-05-01

## 2025-05-01 DIAGNOSIS — L30.9 ECZEMA, UNSPECIFIED TYPE: Primary | ICD-10-CM

## 2025-05-01 PROCEDURE — 99203 OFFICE O/P NEW LOW 30 MIN: CPT | Performed by: PHYSICIAN ASSISTANT

## 2025-05-01 RX ORDER — HYDROCHLOROTHIAZIDE 25 MG/1
25 TABLET ORAL
Qty: 90 TABLET | Refills: 3 | Status: SHIPPED | OUTPATIENT
Start: 2025-05-01

## 2025-05-01 RX ORDER — HYDROCORTISONE 25 MG/G
1 OINTMENT TOPICAL 2 TIMES DAILY
Qty: 20 G | Refills: 0 | Status: SHIPPED | OUTPATIENT
Start: 2025-05-01

## 2025-05-01 NOTE — PROGRESS NOTES
HPI:    Patient ID: Serenity Willams is a 69 year old female.    Patient presents with new rash on the left face cheek. Red skin to the left cheek for the past 1 month. [place] and then returned. Using numerous OTC ltions and cleansers with no results. No draining or tenderness noted. Scaling noted. No new products noted. Hx of allergies to medications noted.         Review of Systems   Constitutional:  Negative for chills and fever.   Musculoskeletal:  Negative for arthralgias and myalgias.   Skin:  Positive for rash. Negative for color change and wound.          Current Medications[1]  Allergies:Allergies[2]   LMP 10/03/1995 (Approximate)   There is no height or weight on file to calculate BMI.  PHYSICAL EXAM:   Physical Exam  Constitutional:       General: She is not in acute distress.     Appearance: Normal appearance.   Skin:     General: Skin is warm and dry.      Findings: Rash present.      Comments: Eczematous area noted on the left face cheek. No draining or tenderness noted. Slight scaling noted. No papules noted. Erythema noted.    Neurological:      Mental Status: She is alert and oriented to person, place, and time.                ASSESSMENT/PLAN:   1. Eczema, unspecified type  -After discussion with patient, advised the following:  -Start hydrocortisone  -Educated to apply 2 times per day for the next 2 weeks  -Continue with moisturizer  -Return in 2 weeks if not improved.   -To call or follow-up with worsening symptoms or concerns  -Patient was agreeable to plan and will comply with discussion above.         No orders of the defined types were placed in this encounter.      Meds This Visit:  Requested Prescriptions     Signed Prescriptions Disp Refills    hydrocortisone 2.5 % External Ointment 20 g 0     Sig: Apply 1 Application topically 2 (two) times daily. Use as needed       Imaging & Referrals:  None         ID#[id number]       [1]   Current Outpatient  Medications   Medication Sig Dispense Refill    hydrocortisone 2.5 % External Ointment Apply 1 Application topically 2 (two) times daily. Use as needed 20 g 0    Meloxicam 7.5 MG Oral Tab Take 1 tablet (7.5 mg total) by mouth daily as needed for Pain. 90 tablet 0    erythromycin 5 MG/GM Ophthalmic Ointment Apply 1 Application to eye nightly. 3.5 g 0    benzonatate 100 MG Oral Cap Take 1 capsule (100 mg total) by mouth 2 (two) times daily as needed for cough. 20 capsule 0    atorvastatin 20 MG Oral Tab Take 1 tablet (20 mg total) by mouth daily. 90 tablet 3    Calcium Carbonate Antacid 600 MG Oral Chew Tab Chew 1,200 mg by mouth daily.      Flaxseed, Linseed, (FLAX SEED OIL) 1000 MG Oral Cap Take 1 capsule by mouth daily.      Turmeric 500 MG Oral Tab Take 1 tablet by mouth daily.      Coenzyme Q10 100 MG Oral Cap Take 100 mg by mouth daily.      Vitamin C 500 MG Oral Tab Take 1 tablet (500 mg total) by mouth daily.      omega-3 fatty acids (FISH OIL) 1000 MG Oral Cap Take 1,000 mg by mouth daily.      Multiple Vitamin (MULTI-VITAMINS) Oral Tab Take 1 tablet by mouth daily.      Garlic 1000 MG Oral Cap Take 1 capsule by mouth daily.      cetirizine (ZYRTEC) 10 MG Oral Tab Take 1 tablet (10 mg total) by mouth 3 (three) times a week.      HYDROCHLOROTHIAZIDE 25 MG Oral Tab TAKE ONE TABLET BY MOUTH DAILY AS NEEDED 90 tablet 3   [2]   Allergies  Allergen Reactions    Demerol Hcl [Meperidine] NAUSEA AND VOMITING    Macrobid [Nitrofurantoin] RASH    Latex      Other reaction(s): LATEX

## 2025-08-13 ENCOUNTER — TELEPHONE (OUTPATIENT)
Dept: INTERNAL MEDICINE CLINIC | Facility: CLINIC | Age: 70
End: 2025-08-13

## (undated) DEVICE — ENDOSCOPY PACK UPPER: Brand: MEDLINE INDUSTRIES, INC.

## (undated) DEVICE — FLEXIBLE YANKAUER,MEDIUM TIP, NO VACUUM CONTROL: Brand: ARGYLE

## (undated) DEVICE — MINOR GENERAL: Brand: MEDLINE INDUSTRIES, INC.

## (undated) DEVICE — CLIP SM INTNL HMCLP TNTLM ESCP

## (undated) DEVICE — NEEDLE 18G 1-1/2 BLUNT FILL

## (undated) DEVICE — MEDI-VAC NON-CONDUCTIVE SUCTION TUBING 6MM X 1.8M (6FT.) L: Brand: CARDINAL HEALTH

## (undated) DEVICE — ADHESIVE MASTISOL 2/3CC VL

## (undated) DEVICE — SUTURE MONOCRYL 4-0 PS-2

## (undated) DEVICE — HEXAGONAL CAPTIVATOR STIFF 13M

## (undated) DEVICE — 3M™ STERI-STRIP™ REINFORCED ADHESIVE SKIN CLOSURES, R1547, 1/2 IN X 4 IN (12 MM X 100 MM), 6 STRIPS/ENVELOPE: Brand: 3M™ STERI-STRIP™

## (undated) DEVICE — DRAPE TAPE: Brand: CONVERTORS

## (undated) DEVICE — Device: Brand: DEFENDO AIR/WATER/SUCTION AND BIOPSY VALVE

## (undated) DEVICE — LINE MNTR ADLT SET O2 INTMD

## (undated) DEVICE — 35 ML SYRINGE REGULAR TIP: Brand: MONOJECT

## (undated) DEVICE — STANDARD HYPODERMIC NEEDLE,POLYPROPYLENE HUB: Brand: MONOJECT

## (undated) DEVICE — FORCEP RADIAL JAW 4

## (undated) DEVICE — ENDOSCOPY PACK - LOWER: Brand: MEDLINE INDUSTRIES, INC.

## (undated) DEVICE — 6 ML SYRINGE LUER-LOCK TIP: Brand: MONOJECT

## (undated) DEVICE — COVER PRB NEOGUARD 30X2.6CM US

## (undated) DEVICE — VEST SRG 2 SM CUP R/L

## (undated) DEVICE — SUTURE PDS II 3-0 Z683G

## (undated) DEVICE — SOL  .9 1000ML BTL

## (undated) DEVICE — KIT ENDO ORCAPOD 160/180/190

## (undated) DEVICE — SPONGE: SPECIALTY PEANUT XR 100/CS: Brand: MEDICAL ACTION INDUSTRIES

## (undated) DEVICE — CLIP MED INTNL HMCLP TNTLM

## (undated) DEVICE — SNARE OPTMZ PLPCTM TRP

## (undated) DEVICE — SUTURE SILK 2-0 FS

## (undated) DEVICE — Device: Brand: JELCO

## (undated) DEVICE — DRAPE SHEET LG

## (undated) DEVICE — SUTURE VICRYL 3-0 SH

## (undated) DEVICE — HEX-LOCKING BLADE ELECTRODE: Brand: EDGE

## (undated) DEVICE — KIT CLEAN ENDOKIT 1.1OZ GOWNX2

## (undated) DEVICE — TRAP 4 CPTR CHMBR N EZ INLN

## (undated) DEVICE — ENCORE® PERRY STYLE 42 PF SIZE 6.5, STERILE LATEX POWDER-FREE SURGICAL GLOVE: Brand: ENCORE

## (undated) DEVICE — CAUTERY BLADE 2IN INS E1455

## (undated) DEVICE — DRAPE PACK CHEST & U BAR

## (undated) NOTE — LETTER
[address]   Date:   6/9/2021     Name:   Corey Son    YOB: 1955   MRN:   [id number]       WHERE IS YOUR PAIN NOW? Edmond the areas on your body where you feel the described sensations.   Use the appropri

## (undated) NOTE — LETTER
[address] [place] [address] Rd, [place], [place]     AUTHORIZATION FOR SURGICAL OPERATION OR PROCEDURE    I hereby authorize                           , my Physician(s) and whomever may be designated as the doctor's Assistant, to perform the 4. I consent to the photographing of procedure(s) to be performed for the purposes of advancing medicine, science and/or education, provided my identity is not revealed.  If the procedure has been videotaped, the physician/surgeon will obtain the original v (Witness signature)                                                                                                  (Date)                                (Time)  STATEMENT OF PHYSICIAN My signature below affirms that prior to the time of the procedure;  I

## (undated) NOTE — LETTER
9/6/2019              [address]        [place] [address]         Dear Evie Crandall,    It was a pleasure to see you.   Neg pap, HPV neg, repeat pap needed in 3 years, return to clinic 1 year for annual exam.  There is

## (undated) NOTE — MR AVS SNAPSHOT
[facility]  [address] [address] [phone]                    After Visit Summary   3/1/2017    Brunilda Record    MRN: [id number]           Visit Information        Provider Department Dept Phone    3/1/2 You can access your MyChart to more actively manage your health care and view more details from this visit by going to [email or web address].   If you've recently had a stay at the Hospital you can access your discharge instructions in [address] by lisa

## (undated) NOTE — Clinical Note
Dear Dr. Mike Alvarez,    Thank you for sending Oren Oden to see me for physiatry consultation. I appreciate your confidence in me to care for your patients. Please feel free call me with any questions at [phone] or contact me through Gregory.     [facility]

## (undated) NOTE — LETTER
[address], [place], [place]     AUTHORIZATION FOR SURGICAL OPERATION OR PROCEDURE    I hereby authorize Dr. Rahul Denton MD, my Physician(s) and whomever may be designated as the doctor's Assistant, to perform the f own blood, or a directed donor transfusion, I will discuss this with my Physician. 4. I consent to the photographing of procedure(s) to be performed for the purposes of advancing medicine, science and/or education, provided my identity is not revealed.  If _______________________________________________________________ ____________________________  (Witness signature)                                                                                                  (Date)                                (Time)

## (undated) NOTE — LETTER
[address], [facility]   Date:   11/10/2021     Name:   Mylene Mtz    YOB: 1955   MRN:   [id number]       WHERE IS YOUR PAIN NOW?   Edmond the areas on your body where you feel th

## (undated) NOTE — LETTER
[facility]  Administration of Anesthesia  1.  Oral Grass, or _________________________________ acting on her behalf, (Patient) (Dependent/Representative) request to receive anesthesia for my pending procedure/operation/treatm infections, high spinal block, spinal bleeding, seizure, cardiac arrest and death. 7. AWARENESS: I understand that it is possible (but unlikely) to have explicit memory of events from the operating room while under general anesthesia.   8. ELECTROCONVULSIV unconscious pt /Relationship    My signature below affirms that prior to the time of the procedure, I have explained to the patient and/or his/her guardian, the risks and benefits of undergoing anesthesia, as well as any reasonable alternatives.     _______

## (undated) NOTE — LETTER
[address]  [facility] [place], [place], [place]  [address]  INFORMED CONSENT FOR TRANSFUSION OF BLOOD OR BLOOD PRODUCTS  My physician has informed me of the nature, purpose, benefits and risks of transfusion for blood and blood components that ______________________________________________  (Signature of Patient)                                                            (Responsible party in case of Minor,

## (undated) NOTE — LETTER
[address] [place] [address]  Authorization for Surgical Operation or Procedure  Date: ______________       Time: _______________  1.  I hereby authorize Dr. Kumar Dasilva, my physician and the assistant, to perform the following operation and/or p 5. I consent to the photographing of the operations or procedures to be performed for the purposes of advancing medicine, science, and/or education, provided my identity is not revealed.  If the procedure has been videotaped, the physician/surgeon will obta risks and benefits involved in the proposed treatment and any reasonable alternative to the proposed treatment. I have also explained the risks and benefits involved in the refusal of the proposed treatment and have answered the patient's questions.  If I h

## (undated) NOTE — ED AVS SNAPSHOT
Andre Tony   MRN: [id number]    Department:  [facility] Emergency Department   Date of Visit:  1/19/2020           Disclosure     Insurance plans vary and the physician(s) referred by the ER may not be covered by your plan.  Please c CARE PHYSICIAN AT ONCE OR RETURN IMMEDIATELY TO THE EMERGENCY DEPARTMENT. If you have been prescribed any medication(s), please fill your prescription right away and begin taking the medication(s) as directed.   If you believe that any of the medications

## (undated) NOTE — Clinical Note
Dear Estephania Miramontes,    I had the opportunity to see your patient Lulú Morales recently. I am sending you this update, and I appreciate your confidence in me to care for your patients.  Please feel free call me with any questions at [phone] or [email or web address]

## (undated) NOTE — LETTER
[address]   Date:   8/27/2021     Name:   Mylene Mtz    YOB: 1955   MRN:   [id number]       WHERE IS YOUR PAIN NOW? Edmond the areas on your body where you feel the described sensations.   Use the appropr

## (undated) NOTE — Clinical Note
Hi, She is having new BM changes and she is due for colonoscopy December of this year. Can she be scoped sooner.   AG

## (undated) NOTE — MR AVS SNAPSHOT
Nelson Cochran   3/1/2017 3:00 PM   Office Visit   MRN:  [id number]    Description:  Female : 1955   Provider:  Betty Hanson   Department:  [facility]              Visit Summary      Primary Visit

## (undated) NOTE — Clinical Note
Dear Dr. Gloria Sepulveda,    I had the opportunity to see your patient Earl Presley recently. I am sending you this update, and I appreciate your confidence in me to care for your patients.  Please feel free call me with any questions at [phone] or [email or web address]

## (undated) NOTE — LETTER
[address]   Date:   7/9/2021     Name:   Hina Lyon    YOB: 1955   MRN:   [id number]       WHERE IS YOUR PAIN NOW? Edmond the areas on your body where you feel the described sensations.   Use the appropri

## (undated) NOTE — Clinical Note
Dear Negrita Rausch,    I had the opportunity to see your patient Hina Lyon recently. I am sending you this update, and I appreciate your confidence in me to care for your patients.  Please feel free call me with any questions at [phone] or [email or web address]